# Patient Record
Sex: FEMALE | Race: BLACK OR AFRICAN AMERICAN | NOT HISPANIC OR LATINO | URBAN - METROPOLITAN AREA
[De-identification: names, ages, dates, MRNs, and addresses within clinical notes are randomized per-mention and may not be internally consistent; named-entity substitution may affect disease eponyms.]

---

## 2017-08-11 ENCOUNTER — EMERGENCY (EMERGENCY)
Facility: HOSPITAL | Age: 57
LOS: 1 days | Discharge: HOME | End: 2017-08-11
Admitting: EMERGENCY MEDICINE
Payer: COMMERCIAL

## 2017-08-11 VITALS
SYSTOLIC BLOOD PRESSURE: 151 MMHG | OXYGEN SATURATION: 100 % | RESPIRATION RATE: 16 BRPM | HEART RATE: 75 BPM | TEMPERATURE: 98 F | DIASTOLIC BLOOD PRESSURE: 99 MMHG

## 2017-08-11 LAB
ALBUMIN SERPL ELPH-MCNC: 3.9 G/DL — SIGNIFICANT CHANGE UP (ref 3.3–5)
ALP SERPL-CCNC: 93 U/L — SIGNIFICANT CHANGE UP (ref 40–120)
ALT FLD-CCNC: 12 U/L — SIGNIFICANT CHANGE UP (ref 4–33)
APTT BLD: 30.3 SEC — SIGNIFICANT CHANGE UP (ref 27.5–37.4)
AST SERPL-CCNC: 18 U/L — SIGNIFICANT CHANGE UP (ref 4–32)
BASOPHILS # BLD AUTO: 0.04 K/UL — SIGNIFICANT CHANGE UP (ref 0–0.2)
BASOPHILS NFR BLD AUTO: 0.4 % — SIGNIFICANT CHANGE UP (ref 0–2)
BILIRUB SERPL-MCNC: 0.3 MG/DL — SIGNIFICANT CHANGE UP (ref 0.2–1.2)
BUN SERPL-MCNC: 12 MG/DL — SIGNIFICANT CHANGE UP (ref 7–23)
CALCIUM SERPL-MCNC: 9.8 MG/DL — SIGNIFICANT CHANGE UP (ref 8.4–10.5)
CHLORIDE SERPL-SCNC: 103 MMOL/L — SIGNIFICANT CHANGE UP (ref 98–107)
CO2 SERPL-SCNC: 24 MMOL/L — SIGNIFICANT CHANGE UP (ref 22–31)
CREAT SERPL-MCNC: 1.42 MG/DL — HIGH (ref 0.5–1.3)
EOSINOPHIL # BLD AUTO: 0.3 K/UL — SIGNIFICANT CHANGE UP (ref 0–0.5)
EOSINOPHIL NFR BLD AUTO: 2.9 % — SIGNIFICANT CHANGE UP (ref 0–6)
GLUCOSE SERPL-MCNC: 97 MG/DL — SIGNIFICANT CHANGE UP (ref 70–99)
HCT VFR BLD CALC: 30.7 % — LOW (ref 34.5–45)
HGB BLD-MCNC: 9.6 G/DL — LOW (ref 11.5–15.5)
IMM GRANULOCYTES # BLD AUTO: 0.04 # — SIGNIFICANT CHANGE UP
IMM GRANULOCYTES NFR BLD AUTO: 0.4 % — SIGNIFICANT CHANGE UP (ref 0–1.5)
INR BLD: 1.12 — SIGNIFICANT CHANGE UP (ref 0.88–1.17)
LIDOCAIN IGE QN: 33.1 U/L — SIGNIFICANT CHANGE UP (ref 7–60)
LYMPHOCYTES # BLD AUTO: 2.37 K/UL — SIGNIFICANT CHANGE UP (ref 1–3.3)
LYMPHOCYTES # BLD AUTO: 23.1 % — SIGNIFICANT CHANGE UP (ref 13–44)
MCHC RBC-ENTMCNC: 22.1 PG — LOW (ref 27–34)
MCHC RBC-ENTMCNC: 31.3 % — LOW (ref 32–36)
MCV RBC AUTO: 70.7 FL — LOW (ref 80–100)
MONOCYTES # BLD AUTO: 0.56 K/UL — SIGNIFICANT CHANGE UP (ref 0–0.9)
MONOCYTES NFR BLD AUTO: 5.4 % — SIGNIFICANT CHANGE UP (ref 2–14)
NEUTROPHILS # BLD AUTO: 6.97 K/UL — SIGNIFICANT CHANGE UP (ref 1.8–7.4)
NEUTROPHILS NFR BLD AUTO: 67.8 % — SIGNIFICANT CHANGE UP (ref 43–77)
NRBC # FLD: 0 — SIGNIFICANT CHANGE UP
PLATELET # BLD AUTO: 347 K/UL — SIGNIFICANT CHANGE UP (ref 150–400)
PMV BLD: 10.2 FL — SIGNIFICANT CHANGE UP (ref 7–13)
POTASSIUM SERPL-MCNC: 4.7 MMOL/L — SIGNIFICANT CHANGE UP (ref 3.5–5.3)
POTASSIUM SERPL-SCNC: 4.7 MMOL/L — SIGNIFICANT CHANGE UP (ref 3.5–5.3)
PROT SERPL-MCNC: 8 G/DL — SIGNIFICANT CHANGE UP (ref 6–8.3)
PROTHROM AB SERPL-ACNC: 12.6 SEC — SIGNIFICANT CHANGE UP (ref 9.8–13.1)
RBC # BLD: 4.34 M/UL — SIGNIFICANT CHANGE UP (ref 3.8–5.2)
RBC # FLD: 16.7 % — HIGH (ref 10.3–14.5)
SODIUM SERPL-SCNC: 140 MMOL/L — SIGNIFICANT CHANGE UP (ref 135–145)
WBC # BLD: 10.28 K/UL — SIGNIFICANT CHANGE UP (ref 3.8–10.5)
WBC # FLD AUTO: 10.28 K/UL — SIGNIFICANT CHANGE UP (ref 3.8–10.5)

## 2017-08-11 PROCEDURE — 76705 ECHO EXAM OF ABDOMEN: CPT | Mod: 26

## 2017-08-11 PROCEDURE — 99285 EMERGENCY DEPT VISIT HI MDM: CPT

## 2017-08-11 RX ORDER — MORPHINE SULFATE 50 MG/1
4 CAPSULE, EXTENDED RELEASE ORAL ONCE
Qty: 0 | Refills: 0 | Status: DISCONTINUED | OUTPATIENT
Start: 2017-08-11 | End: 2017-08-11

## 2017-08-11 RX ORDER — SODIUM CHLORIDE 9 MG/ML
1000 INJECTION INTRAMUSCULAR; INTRAVENOUS; SUBCUTANEOUS ONCE
Qty: 0 | Refills: 0 | Status: COMPLETED | OUTPATIENT
Start: 2017-08-11 | End: 2017-08-11

## 2017-08-11 RX ADMIN — MORPHINE SULFATE 4 MILLIGRAM(S): 50 CAPSULE, EXTENDED RELEASE ORAL at 23:06

## 2017-08-11 RX ADMIN — SODIUM CHLORIDE 2000 MILLILITER(S): 9 INJECTION INTRAMUSCULAR; INTRAVENOUS; SUBCUTANEOUS at 22:20

## 2017-08-11 NOTE — ED PROVIDER NOTE - PLAN OF CARE
Follow up with your PMD within 24-48hrs. Show copies of your reports given to you. Take all of your medications as previously prescribed. Resume your iron as previously prescribed. Take Motrin 600mg every 6-8hrs as needed for pain with food. Low fat diet as discussed. Follow up with a General Surgeon within the week- referral list provided. Worsening, continued or new concerning symptoms return to the emergency department.

## 2017-08-11 NOTE — ED PROVIDER NOTE - OBJECTIVE STATEMENT
56 y/o female presents to the ED c/o RUQ abd pain with no radiation x last night with decreased appetite. Pt reports pain with sudden onset, atraumatic, and not associated with eating. Pain worsens with lying down; hence, pt slept on the chair. Reports waking up with similar pain intensity. Visited urgent care, who advised ED visits for possible cholecystitis. Last BM: earlier today. Denies N/V, changes in BM, dysuria, hematuria, and any other complaints.  PMHX: HTN, HLD, colon polyps, and borderline DM  PSHx: .  No Hx of gallstones nor kidney stones.  Medications: simvastatin for HLD, and amlodipine for HTN.  No smoking Hx.  NKDA. 58 y/o female presents to the ED c/o sudden onset RUQ abd pain with no radiation since last night with decreased appetite. Pt reports pain with sudden onset, atraumatic, and not associated with eating. Pain worsens with lying down; hence, pt slept on the chair. Reports waking up with similar pain intensity. Visited urgent care, who advised ED visits for possible cholecystitis. Last BM: earlier today. Denies N/V, changes in BM, dysuria, hematuria, and any other complaints.  PMHX: HTN, HLD, colon polyps, and borderline DM  PSHx: .  No Hx of gallstones nor kidney stones.  Medications: simvastatin for HLD, and amlodipine for HTN.  No smoking Hx.  NKDA. 56 y/o female presents to the ED c/o sudden onset RUQ abd pain with no radiation since last night with decreased appetite. Pt reports pain with sudden onset, atraumatic, and not associated with eating.  Reports waking up with similar pain intensity. Visited urgent care, who advised ED visits for possible cholecystitis. Last BM: earlier today- losse stool. Denies chest pain, shortness of breath, N/V,  dysuria, hematuria, recent travel, recent illness   PMHX: HTN, HLD, colon polyps, and borderline DM  PSHx: .  No Hx of gallstones nor kidney stones.  Medications: simvastatin amlodipine   No smoking Hx.  NKDA.

## 2017-08-11 NOTE — ED PROVIDER NOTE - MEDICAL DECISION MAKING DETAILS
58 y/o female with sudden onset, severe RUQ pain. R/o acute cholecystitis. Plan: Labs: fluids, NPO, pain control, and US abd. 56 y/o female with sudden onset, severe RUQ pain. R/o acute cholecystitis. Plan: Labs: fluids, NPO, pain control, and US abd, reassess

## 2017-08-11 NOTE — ED PROVIDER NOTE - PROGRESS NOTE DETAILS
ROLANDO Medrano- pt states improvement s/p pain medication. Abdominal US revealing adenomyomatosis of the gallbladder without gallstones or evidence of cholecystitis. LFTS and lipase WNL. Hg 9.6- pt states h/o anemia in which she is suppose to be taking iron but stopped. Agrees to resume. Discussed low fat diet until sx's resolve and further evaluation with general surgeon.  creatinine noted to be 1.46- 1 liter of fluids given. Pt instructed to increase fluids and to have repeated with PMD.

## 2017-08-11 NOTE — ED PROVIDER NOTE - CARE PLAN
Principal Discharge DX:	Abdominal pain Principal Discharge DX:	Abdominal pain  Instructions for follow-up, activity and diet:	Follow up with your PMD within 24-48hrs. Show copies of your reports given to you. Take all of your medications as previously prescribed. Resume your iron as previously prescribed. Take Motrin 600mg every 6-8hrs as needed for pain with food. Low fat diet as discussed. Follow up with a General Surgeon within the week- referral list provided. Worsening, continued or new concerning symptoms return to the emergency department.

## 2017-08-11 NOTE — ED ADULT TRIAGE NOTE - CHIEF COMPLAINT QUOTE
Pt c/o pain to RUQ of abdomen since yesterday, pt was seen in urgent care and sent in to r/o gallbladder stones, denies n/v/d.

## 2017-08-12 RX ORDER — IBUPROFEN 200 MG
1 TABLET ORAL
Qty: 20 | Refills: 0 | OUTPATIENT
Start: 2017-08-12

## 2017-08-21 PROBLEM — Z00.00 ENCOUNTER FOR PREVENTIVE HEALTH EXAMINATION: Status: ACTIVE | Noted: 2017-08-21

## 2017-08-24 ENCOUNTER — APPOINTMENT (OUTPATIENT)
Dept: SURGERY | Facility: CLINIC | Age: 57
End: 2017-08-24

## 2017-09-02 ENCOUNTER — OUTPATIENT (OUTPATIENT)
Dept: OUTPATIENT SERVICES | Facility: HOSPITAL | Age: 57
LOS: 1 days | End: 2017-09-02
Payer: COMMERCIAL

## 2017-09-02 PROCEDURE — 74177 CT ABD & PELVIS W/CONTRAST: CPT | Mod: 26

## 2017-09-02 PROCEDURE — 74177 CT ABD & PELVIS W/CONTRAST: CPT

## 2017-09-07 ENCOUNTER — INPATIENT (INPATIENT)
Facility: HOSPITAL | Age: 57
LOS: 8 days | Discharge: HOME HEALTH | DRG: 329 | End: 2017-09-16
Attending: STUDENT IN AN ORGANIZED HEALTH CARE EDUCATION/TRAINING PROGRAM | Admitting: STUDENT IN AN ORGANIZED HEALTH CARE EDUCATION/TRAINING PROGRAM
Payer: COMMERCIAL

## 2017-09-07 VITALS
DIASTOLIC BLOOD PRESSURE: 94 MMHG | HEART RATE: 96 BPM | OXYGEN SATURATION: 99 % | RESPIRATION RATE: 18 BRPM | WEIGHT: 193.57 LBS | TEMPERATURE: 99 F | SYSTOLIC BLOOD PRESSURE: 143 MMHG

## 2017-09-07 DIAGNOSIS — Z98.891 HISTORY OF UTERINE SCAR FROM PREVIOUS SURGERY: Chronic | ICD-10-CM

## 2017-09-07 LAB
ALBUMIN SERPL ELPH-MCNC: 3.8 G/DL — SIGNIFICANT CHANGE UP (ref 3.3–5)
ALP SERPL-CCNC: 93 U/L — SIGNIFICANT CHANGE UP (ref 40–120)
ALT FLD-CCNC: 10 U/L — SIGNIFICANT CHANGE UP (ref 10–45)
ANION GAP SERPL CALC-SCNC: 16 MMOL/L — SIGNIFICANT CHANGE UP (ref 5–17)
APTT BLD: 28.9 SEC — SIGNIFICANT CHANGE UP (ref 27.5–37.4)
AST SERPL-CCNC: 18 U/L — SIGNIFICANT CHANGE UP (ref 10–40)
BASOPHILS NFR BLD AUTO: 0.3 % — SIGNIFICANT CHANGE UP (ref 0–2)
BILIRUB SERPL-MCNC: 0.3 MG/DL — SIGNIFICANT CHANGE UP (ref 0.2–1.2)
BUN SERPL-MCNC: 10 MG/DL — SIGNIFICANT CHANGE UP (ref 7–23)
CALCIUM SERPL-MCNC: 9.8 MG/DL — SIGNIFICANT CHANGE UP (ref 8.4–10.5)
CHLORIDE SERPL-SCNC: 100 MMOL/L — SIGNIFICANT CHANGE UP (ref 96–108)
CO2 SERPL-SCNC: 23 MMOL/L — SIGNIFICANT CHANGE UP (ref 22–31)
CREAT SERPL-MCNC: 1.5 MG/DL — HIGH (ref 0.5–1.3)
EOSINOPHIL NFR BLD AUTO: 2.1 % — SIGNIFICANT CHANGE UP (ref 0–6)
GLUCOSE SERPL-MCNC: 104 MG/DL — HIGH (ref 70–99)
HCT VFR BLD CALC: 29.8 % — LOW (ref 34.5–45)
HGB BLD-MCNC: 9.3 G/DL — LOW (ref 11.5–15.5)
INR BLD: 1.13 — SIGNIFICANT CHANGE UP (ref 0.88–1.16)
LYMPHOCYTES # BLD AUTO: 17.4 % — SIGNIFICANT CHANGE UP (ref 13–44)
MCHC RBC-ENTMCNC: 21 PG — LOW (ref 27–34)
MCHC RBC-ENTMCNC: 31.2 G/DL — LOW (ref 32–36)
MCV RBC AUTO: 67.3 FL — LOW (ref 80–100)
MONOCYTES NFR BLD AUTO: 7.9 % — SIGNIFICANT CHANGE UP (ref 2–14)
NEUTROPHILS NFR BLD AUTO: 72.3 % — SIGNIFICANT CHANGE UP (ref 43–77)
PLATELET # BLD AUTO: 453 K/UL — HIGH (ref 150–400)
POTASSIUM SERPL-MCNC: 4.7 MMOL/L — SIGNIFICANT CHANGE UP (ref 3.5–5.3)
POTASSIUM SERPL-SCNC: 4.7 MMOL/L — SIGNIFICANT CHANGE UP (ref 3.5–5.3)
PROT SERPL-MCNC: 8.1 G/DL — SIGNIFICANT CHANGE UP (ref 6–8.3)
PROTHROM AB SERPL-ACNC: 12.6 SEC — SIGNIFICANT CHANGE UP (ref 9.8–12.7)
RBC # BLD: 4.43 M/UL — SIGNIFICANT CHANGE UP (ref 3.8–5.2)
RBC # FLD: 16.8 % — SIGNIFICANT CHANGE UP (ref 10.3–16.9)
SODIUM SERPL-SCNC: 139 MMOL/L — SIGNIFICANT CHANGE UP (ref 135–145)
WBC # BLD: 10.2 K/UL — SIGNIFICANT CHANGE UP (ref 3.8–10.5)
WBC # FLD AUTO: 10.2 K/UL — SIGNIFICANT CHANGE UP (ref 3.8–10.5)

## 2017-09-07 PROCEDURE — 99221 1ST HOSP IP/OBS SF/LOW 40: CPT

## 2017-09-07 PROCEDURE — 71010: CPT | Mod: 26

## 2017-09-07 PROCEDURE — 93010 ELECTROCARDIOGRAM REPORT: CPT

## 2017-09-07 PROCEDURE — 99285 EMERGENCY DEPT VISIT HI MDM: CPT | Mod: 25

## 2017-09-07 PROCEDURE — 99222 1ST HOSP IP/OBS MODERATE 55: CPT | Mod: GC

## 2017-09-07 RX ORDER — SODIUM CHLORIDE 9 MG/ML
1000 INJECTION INTRAMUSCULAR; INTRAVENOUS; SUBCUTANEOUS ONCE
Qty: 0 | Refills: 0 | Status: COMPLETED | OUTPATIENT
Start: 2017-09-07 | End: 2017-09-07

## 2017-09-07 RX ADMIN — SODIUM CHLORIDE 1000 MILLILITER(S): 9 INJECTION INTRAMUSCULAR; INTRAVENOUS; SUBCUTANEOUS at 23:00

## 2017-09-07 NOTE — ED ADULT NURSE NOTE - CHPI ED SYMPTOMS NEG
no vomiting/no blood in stool/no diarrhea/no dysuria/no fever/no nausea/no abdominal distension/no chills/no burning urination/no hematuria

## 2017-09-07 NOTE — ED PROVIDER NOTE - PROGRESS NOTE DETAILS
spoke with Dr. Urias - recommend admission to hospitalist and he will see in the morning. urology aware.

## 2017-09-07 NOTE — ED PROVIDER NOTE - OBJECTIVE STATEMENT
57F hx htn, high chol, newly diagnosed metastatic colon cancer sent in by Dr. Urias for admission. pt had CT scan 9/2 showing metastatic disease, colonic mass causing L ureteral obstruction with hydronephrosis.  pt c/o mild diffuse abdominal discomfort. decreased appetite. states has had about 6lb weight loss over past week.  also states no bowel movement x1 week.  was told to come in for likely colonic stent and ureteral stent.  no fevers. no vomiting.

## 2017-09-07 NOTE — ED PROVIDER NOTE - MEDICAL DECISION MAKING DETAILS
recently diagnosed metastatic cancer with large colonic mass causing blockage  -check labs, ekg, cxr

## 2017-09-08 ENCOUNTER — TRANSCRIPTION ENCOUNTER (OUTPATIENT)
Age: 57
End: 2017-09-08

## 2017-09-08 DIAGNOSIS — E78.00 PURE HYPERCHOLESTEROLEMIA, UNSPECIFIED: ICD-10-CM

## 2017-09-08 DIAGNOSIS — D50.9 IRON DEFICIENCY ANEMIA, UNSPECIFIED: ICD-10-CM

## 2017-09-08 DIAGNOSIS — R10.9 UNSPECIFIED ABDOMINAL PAIN: ICD-10-CM

## 2017-09-08 DIAGNOSIS — D47.3 ESSENTIAL (HEMORRHAGIC) THROMBOCYTHEMIA: ICD-10-CM

## 2017-09-08 DIAGNOSIS — N13.30 UNSPECIFIED HYDRONEPHROSIS: ICD-10-CM

## 2017-09-08 DIAGNOSIS — R60.0 LOCALIZED EDEMA: ICD-10-CM

## 2017-09-08 DIAGNOSIS — N17.9 ACUTE KIDNEY FAILURE, UNSPECIFIED: ICD-10-CM

## 2017-09-08 DIAGNOSIS — C78.5 SECONDARY MALIGNANT NEOPLASM OF LARGE INTESTINE AND RECTUM: ICD-10-CM

## 2017-09-08 DIAGNOSIS — I10 ESSENTIAL (PRIMARY) HYPERTENSION: ICD-10-CM

## 2017-09-08 DIAGNOSIS — Q45.3 OTHER CONGENITAL MALFORMATIONS OF PANCREAS AND PANCREATIC DUCT: ICD-10-CM

## 2017-09-08 DIAGNOSIS — D64.9 ANEMIA, UNSPECIFIED: ICD-10-CM

## 2017-09-08 LAB
ANION GAP SERPL CALC-SCNC: 13 MMOL/L — SIGNIFICANT CHANGE UP (ref 5–17)
APPEARANCE UR: CLEAR — SIGNIFICANT CHANGE UP
APTT BLD: 34.2 SEC — SIGNIFICANT CHANGE UP (ref 27.5–37.4)
BILIRUB UR-MCNC: NEGATIVE — SIGNIFICANT CHANGE UP
BUN SERPL-MCNC: 10 MG/DL — SIGNIFICANT CHANGE UP (ref 7–23)
CALCIUM SERPL-MCNC: 9.3 MG/DL — SIGNIFICANT CHANGE UP (ref 8.4–10.5)
CEA SERPL-MCNC: 22.7 NG/ML — HIGH (ref 0–3.8)
CHLORIDE SERPL-SCNC: 102 MMOL/L — SIGNIFICANT CHANGE UP (ref 96–108)
CO2 SERPL-SCNC: 24 MMOL/L — SIGNIFICANT CHANGE UP (ref 22–31)
COLOR SPEC: YELLOW — SIGNIFICANT CHANGE UP
CREAT SERPL-MCNC: 1.4 MG/DL — HIGH (ref 0.5–1.3)
DIFF PNL FLD: NEGATIVE — SIGNIFICANT CHANGE UP
FERRITIN SERPL-MCNC: 18 NG/ML — SIGNIFICANT CHANGE UP (ref 15–150)
GLUCOSE SERPL-MCNC: 94 MG/DL — SIGNIFICANT CHANGE UP (ref 70–99)
GLUCOSE UR QL: NEGATIVE — SIGNIFICANT CHANGE UP
HCT VFR BLD CALC: 27.8 % — LOW (ref 34.5–45)
HGB BLD-MCNC: 8.6 G/DL — LOW (ref 11.5–15.5)
INR BLD: 1.16 — SIGNIFICANT CHANGE UP (ref 0.88–1.16)
IRON SATN MFR SERPL: 17 UG/DL — LOW (ref 30–160)
IRON SATN MFR SERPL: 6 % — LOW (ref 14–50)
KETONES UR-MCNC: NEGATIVE — SIGNIFICANT CHANGE UP
LEUKOCYTE ESTERASE UR-ACNC: NEGATIVE — SIGNIFICANT CHANGE UP
MAGNESIUM SERPL-MCNC: 2.2 MG/DL — SIGNIFICANT CHANGE UP (ref 1.6–2.6)
MCHC RBC-ENTMCNC: 21.2 PG — LOW (ref 27–34)
MCHC RBC-ENTMCNC: 30.9 G/DL — LOW (ref 32–36)
MCV RBC AUTO: 68.5 FL — LOW (ref 80–100)
NITRITE UR-MCNC: NEGATIVE — SIGNIFICANT CHANGE UP
PH UR: 6 — SIGNIFICANT CHANGE UP (ref 5–8)
PLATELET # BLD AUTO: 380 K/UL — SIGNIFICANT CHANGE UP (ref 150–400)
POTASSIUM SERPL-MCNC: 4.4 MMOL/L — SIGNIFICANT CHANGE UP (ref 3.5–5.3)
POTASSIUM SERPL-SCNC: 4.4 MMOL/L — SIGNIFICANT CHANGE UP (ref 3.5–5.3)
PROT UR-MCNC: NEGATIVE MG/DL — SIGNIFICANT CHANGE UP
PROTHROM AB SERPL-ACNC: 12.9 SEC — HIGH (ref 9.8–12.7)
RBC # BLD: 4.06 M/UL — SIGNIFICANT CHANGE UP (ref 3.8–5.2)
RBC # FLD: 17 % — HIGH (ref 10.3–16.9)
SODIUM SERPL-SCNC: 139 MMOL/L — SIGNIFICANT CHANGE UP (ref 135–145)
SP GR SPEC: <=1.005 — SIGNIFICANT CHANGE UP (ref 1–1.03)
TIBC SERPL-MCNC: 276 UG/DL — SIGNIFICANT CHANGE UP (ref 220–430)
UIBC SERPL-MCNC: 259 UG/DL — SIGNIFICANT CHANGE UP (ref 110–370)
UROBILINOGEN FLD QL: 0.2 E.U./DL — SIGNIFICANT CHANGE UP
WBC # BLD: 7.4 K/UL — SIGNIFICANT CHANGE UP (ref 3.8–10.5)
WBC # FLD AUTO: 7.4 K/UL — SIGNIFICANT CHANGE UP (ref 3.8–10.5)

## 2017-09-08 PROCEDURE — 93010 ELECTROCARDIOGRAM REPORT: CPT

## 2017-09-08 PROCEDURE — 99233 SBSQ HOSP IP/OBS HIGH 50: CPT | Mod: GC

## 2017-09-08 PROCEDURE — 93970 EXTREMITY STUDY: CPT | Mod: 26

## 2017-09-08 PROCEDURE — 78815 PET IMAGE W/CT SKULL-THIGH: CPT | Mod: 26

## 2017-09-08 RX ORDER — AMLODIPINE BESYLATE 2.5 MG/1
5 TABLET ORAL DAILY
Qty: 0 | Refills: 0 | Status: DISCONTINUED | OUTPATIENT
Start: 2017-09-08 | End: 2017-09-08

## 2017-09-08 RX ORDER — SODIUM CHLORIDE 9 MG/ML
1000 INJECTION INTRAMUSCULAR; INTRAVENOUS; SUBCUTANEOUS
Qty: 0 | Refills: 0 | Status: DISCONTINUED | OUTPATIENT
Start: 2017-09-08 | End: 2017-09-13

## 2017-09-08 RX ORDER — ACETAMINOPHEN 500 MG
650 TABLET ORAL EVERY 6 HOURS
Qty: 0 | Refills: 0 | Status: DISCONTINUED | OUTPATIENT
Start: 2017-09-08 | End: 2017-09-14

## 2017-09-08 RX ORDER — SODIUM FERRIC GLUCONAT/SUCROSE 62.5MG/5ML
100 AMPUL (ML) INTRAVENOUS ONCE
Qty: 0 | Refills: 0 | Status: COMPLETED | OUTPATIENT
Start: 2017-09-08 | End: 2017-09-08

## 2017-09-08 RX ORDER — AMLODIPINE BESYLATE 2.5 MG/1
5 TABLET ORAL DAILY
Qty: 0 | Refills: 0 | Status: DISCONTINUED | OUTPATIENT
Start: 2017-09-08 | End: 2017-09-14

## 2017-09-08 RX ORDER — POLYETHYLENE GLYCOL 3350 17 G/17G
17 POWDER, FOR SOLUTION ORAL DAILY
Qty: 0 | Refills: 0 | Status: DISCONTINUED | OUTPATIENT
Start: 2017-09-08 | End: 2017-09-09

## 2017-09-08 RX ORDER — SODIUM FERRIC GLUCONAT/SUCROSE 62.5MG/5ML
AMPUL (ML) INTRAVENOUS
Qty: 0 | Refills: 0 | Status: COMPLETED | OUTPATIENT
Start: 2017-09-09 | End: 2017-09-09

## 2017-09-08 RX ORDER — SODIUM FERRIC GLUCONAT/SUCROSE 62.5MG/5ML
25 AMPUL (ML) INTRAVENOUS ONCE
Qty: 0 | Refills: 0 | Status: COMPLETED | OUTPATIENT
Start: 2017-09-08 | End: 2017-09-08

## 2017-09-08 RX ORDER — SIMVASTATIN 20 MG/1
40 TABLET, FILM COATED ORAL AT BEDTIME
Qty: 0 | Refills: 0 | Status: DISCONTINUED | OUTPATIENT
Start: 2017-09-08 | End: 2017-09-14

## 2017-09-08 RX ORDER — SODIUM CHLORIDE 9 MG/ML
1000 INJECTION INTRAMUSCULAR; INTRAVENOUS; SUBCUTANEOUS
Qty: 0 | Refills: 0 | Status: DISCONTINUED | OUTPATIENT
Start: 2017-09-08 | End: 2017-09-08

## 2017-09-08 RX ORDER — ENOXAPARIN SODIUM 100 MG/ML
40 INJECTION SUBCUTANEOUS EVERY 24 HOURS
Qty: 0 | Refills: 0 | Status: DISCONTINUED | OUTPATIENT
Start: 2017-09-08 | End: 2017-09-08

## 2017-09-08 RX ADMIN — SIMVASTATIN 40 MILLIGRAM(S): 20 TABLET, FILM COATED ORAL at 22:00

## 2017-09-08 RX ADMIN — AMLODIPINE BESYLATE 5 MILLIGRAM(S): 2.5 TABLET ORAL at 00:53

## 2017-09-08 RX ADMIN — Medication 108 MILLIGRAM(S): at 19:42

## 2017-09-08 RX ADMIN — ENOXAPARIN SODIUM 40 MILLIGRAM(S): 100 INJECTION SUBCUTANEOUS at 01:37

## 2017-09-08 RX ADMIN — Medication 108 MILLIGRAM(S): at 22:00

## 2017-09-08 RX ADMIN — SODIUM CHLORIDE 70 MILLILITER(S): 9 INJECTION INTRAMUSCULAR; INTRAVENOUS; SUBCUTANEOUS at 00:52

## 2017-09-08 NOTE — CONSULT NOTE ADULT - ASSESSMENT
57 year old F with PMHx significant for Metastatic colon cancer, Pancreatic mass, HTN, HLD, Obesity, sent to the ED by her oncologist (Dr Urias) for evaluation of abdominal discomfort and constipation x1 week.    PLAN -   # Colon cancer -   - with mets   - with near obstruction - causing constipation  - please give miralax daily to help loosen stool over the weekend  - will plan for a colonic stent early next week   - surgical input     # Pancreatic mass -   - unclear if a primary pancreatic mass vs mets (less likely)  - will plan for EUS with Dr Terrell early next week  - can be scheduled as an outpatient if patient stable for dc    Discussed with attending Dr Terrell  GI following

## 2017-09-08 NOTE — H&P ADULT - ATTENDING COMMENTS
pt seen and examined; reviewed h&p, vs, labs, cxr  pt a/f constipation in setting of colonic mass, also found to have left hydroureteronephrosis  from mass compression; pt reports passing gas  PE findings as above, abdomen is nontender, in addition no CVA tenderness b/l   a/p:   1. abdominal pain, constipation in setting of colonic mass: planning for stent procedure; GI consult pending; NPO for now, on IVFs   2. Colon cancer w/ mets: follow up onc recs  3. L hydronephrosis : appreciate urologic recs; procedure pending   4. CATRACHITA: monitor renal function, c/w IVFs  5. lower ext edema: agree w/ dopplers  6. pancreatic lesion: follow up onc recs   7. anemia/ thrombocytosis: monitor CBC pt seen and examined; reviewed h&p, vs, labs, cxr  pt a/f constipation in setting of colonic mass, also found to have left hydroureteronephrosis  from mass compression; pt reports passing gas  PE findings as above, abdomen is nontender, in addition no CVA tenderness b/l   a/p:   1. abdominal pain, constipation in setting of colonic mass: planning for stent procedure; GI consult pending; NPO for now, on IVFs   2. Colon cancer w/ mets: follow up onc recs  3. L hydronephrosis : appreciate urologic recs; procedure pending   4. CATRACHITA: monitor renal function, c/w IVFs  5. lower ext edema: agree w/ dopplers  6. pancreatic lesion: follow up onc recs   7. anemia/ thrombocytosis: monitor CBC  8. Hold lovenox pending possible GI and urologic procedures

## 2017-09-08 NOTE — H&P ADULT - PROBLEM SELECTOR PLAN 10
-c/w simvastatin 40 mg daily    Diet: NPO for now    DVT prophylaxis: lovenox    FULL CODE    Admit to F -c/w simvastatin 40 mg daily    Diet: NPO for now    DVT prophylaxis: hold lovenox     FULL CODE    Admit to F

## 2017-09-08 NOTE — PROGRESS NOTE ADULT - SUBJECTIVE AND OBJECTIVE BOX
Patient films reviewed with attending. Hydronephrosis and hydroureter on left with invasion of colonic mass into left ureter. Creatinine mildly elevated at 1.5, afebrile, making urine. Would recommend urine cytology and can pursue left percutaneous nephrostomy if consistent with goals of care. Discussed with attending.                          8.6    7.4   )-----------( 380      ( 08 Sep 2017 07:40 )             27.8     08 Sep 2017 07:40    139    |  102    |  10     ----------------------------<  94     4.4     |  24     |  1.40     Ca    9.3        08 Sep 2017 07:40  Mg     2.2       08 Sep 2017 07:40    TPro  8.1    /  Alb  3.8    /  TBili  0.3    /  DBili  x      /  AST  18     /  ALT  10     /  AlkPhos  93     07 Sep 2017 21:53

## 2017-09-08 NOTE — CONSULT NOTE ADULT - PROBLEM SELECTOR RECOMMENDATION 9
Suspicion for metastatic colon cancer given liver and lung masses biopsy consistent with adenocarcinoma, 6.4cm on CT imaging with compression of left ureter causing hydronephrosis and colonic obstruction. Of note pancreatic mass 2.4cm is suspicious could be second primary vs. metastasis vs. non malig lesion. Urology, GI, and surgery on board. Overall patient has good performance status and will likely be a good candidate for chemotherapy.     -f/u PET/CT scan for staging  -tumor markers CEA,   -will likely need liver biospy and tissue for molecular markers  -colonic stent vs. colostomy to relieve obstruction and continue bowel regimen   -monitor renal function s/p nephrostomy tube

## 2017-09-08 NOTE — PROGRESS NOTE ADULT - ASSESSMENT
57 year old F w/ PMH of recently diagnosed metastatic colon cancer, HTN, HLD, presenting to [facility] ED with abdominal discomfort and constipation x1 week

## 2017-09-08 NOTE — CONSULT NOTE ADULT - PROBLEM SELECTOR RECOMMENDATION 9
Caused by colon mass  -admitted to medicine  -for L stent vs PCN  -to d/w attending  -to review CT images  -continue present care

## 2017-09-08 NOTE — PROGRESS NOTE ADULT - PROBLEM SELECTOR PLAN 10
-c/w simvastatin 40 mg daily    Diet: NPO for procedure otherwise Reg Diet  DVT prophylaxis: hold lovenox pending procedure  FULL CODE  Continue on RMF

## 2017-09-08 NOTE — CONSULT NOTE ADULT - SUBJECTIVE AND OBJECTIVE BOX
Hematology Oncology Consult Note (Dr. Urias)     57 year old F with PMH of HTN and HLD who is here for initial consult of newly diagnosed colorectal adenocarcinoma. Patient initially began having weight loss, abnormal stools, and intermittent bright red blood per rectum in mid March. She had colonoscopy which revealed a large lesion in the sigmoid colon; scope could not be advanced past obstructive tumor. Biospy showed moderately differentiated invasive adenocarcinoma. CT scan of abd/pelvis showed bilateral lung nodules and a 3.7 cm in largest diameter liver lesion consistent with mets. In addition, there is pancreatic duct dilation and a mass at the pancreatic body. LFTs, T Bili WNL, Hb trending down, MCV 70. Patient has not had a BM since last week after colonoscopy. ROS also + for weight loss. No chest pain, dyspnea, cough. No abdominal pain however notes distention. No nausea/vomiting. No diarrhea as of now.       ROS is otherwise negative.      PAST MEDICAL & SURGICAL HISTORY:  High cholesterol  HTN (hypertension)  Colon cancer  H/O  section      Allergies: NKDA       Medications: · 	amLODIPine 5 mg oral tablet: 1 tab(s) orally once a day, Last Dose Taken:    · 	simvastatin 40 mg oral tablet: 1 tab(s) orally once a day (at bedtime), Last Dose Taken:      . .        Social History:No smoking  No drinking  lives with son    FAMILY HISTORY:  Family history of colon cancer (Sibling)      PHYSICAL EXAM:    T(F): 98.2 (17 @ 09:29), Max: 98.8 (17 @ 21:24)  HR: 87 (17 @ 09:29) (68 - 96)  BP: 136/86 (17 @ 09:29) (108/69 - 168/96)  RR: 14 (17 @ 09:29) (14 - 18)  SpO2: 100% (17 @ 09:29) (97% - 100%)  Wt(kg): --    Daily Height in cm: 154.94 (08 Sep 2017 01:14)    Daily Weight in k.8 (08 Sep 2017 01:14)    Exam:             Labs:                          8.6    7.4   )-----------( 380      ( 08 Sep 2017 07:40 )             27.8     CBC Full  -  ( 08 Sep 2017 07:40 )  WBC Count : 7.4 K/uL  Hemoglobin : 8.6 g/dL  Hematocrit : 27.8 %  Platelet Count - Automated : 380 K/uL  Mean Cell Volume : 68.5 fL  Mean Cell Hemoglobin : 21.2 pg  Mean Cell Hemoglobin Concentration : 30.9 g/dL  Auto Neutrophil # : x  Auto Lymphocyte # : x  Auto Monocyte # : x  Auto Eosinophil # : x  Auto Basophil # : x  Auto Neutrophil % : x  Auto Lymphocyte % : x  Auto Monocyte % : x  Auto Eosinophil % : x  Auto Basophil % : x    PT/INR - ( 08 Sep 2017 07:40 )   PT: 12.9 sec;   INR: 1.16          PTT - ( 08 Sep 2017 07:40 )  PTT:34.2 sec        139  |  102  |  10  ----------------------------<  94  4.4   |  24  |  1.40<H>    Ca    9.3      08 Sep 2017 07:40  Mg     2.2         TPro  8.1  /  Alb  3.8  /  TBili  0.3  /  DBili  x   /  AST  18  /  ALT  10  /  AlkPhos  93        Urinalysis Basic - ( 08 Sep 2017 00:37 )    Color: Yellow / Appearance: Clear / SG: <=1.005 / pH: x  Gluc: x / Ketone: NEGATIVE  / Bili: NEGATIVE / Urobili: 0.2 E.U./dL   Blood: x / Protein: NEGATIVE mg/dL / Nitrite: NEGATIVE   Leuk Esterase: NEGATIVE / RBC: x / WBC x   Sq Epi: x / Non Sq Epi: x / Bacteria: x      CT Abd/Pelvis w/wo Contrast:      IMPRESSION: Multiple lung nodules highly suggestive of lung metastases.  2. Lesion in segment 4 of liver highly suggestive of metastasis.  3. Solid tumor in the pancreatic body with upstream dilatation of   pancreatic duct and atrophy suspect underlying pancreatic adenocarcinoma.  4. Bulky tumor mass involving the proximal sigmoid suggestive of primary   adenocarcinoma with extracolonic extension and obstruction of the left   pelvic ureter causing left hydronephrosis and hydroureter. Hematology Oncology Consult Note (Dr. Urias)     Note per outpatient clinic visit  57 year old F with PMH of HTN and HLD who is here for initial consult of newly diagnosed colorectal adenocarcinoma. Patient initially began having weight loss, abnormal stools, and intermittent bright red blood per rectum in mid March. She had colonoscopy which revealed a large lesion in the sigmoid colon; scope could not be advanced past obstructive tumor. Biospy showed moderately differentiated invasive adenocarcinoma. CT scan of abd/pelvis showed bilateral lung nodules and a 3.7 cm in largest diameter liver lesion consistent with mets. In addition, there is pancreatic duct dilation and a mass at the pancreatic body. LFTs, T Bili WNL, Hb trending down, MCV 70. Patient has not had a BM since last week after colonoscopy. ROS also + for weight loss. No chest pain, dyspnea, cough. No abdominal pain however notes distention. No nausea/vomiting. No diarrhea as of now.       ROS is otherwise negative.      PAST MEDICAL & SURGICAL HISTORY:  High cholesterol  HTN (hypertension)  Colon cancer  H/O  section      Allergies: NKDA       Medications: · 	amLODIPine 5 mg oral tablet: 1 tab(s) orally once a day, Last Dose Taken:    · 	simvastatin 40 mg oral tablet: 1 tab(s) orally once a day (at bedtime), Last Dose Taken:      . .        Social History:No smoking  No drinking  lives with son    FAMILY HISTORY:  Family history of colon cancer (Sibling)      PHYSICAL EXAM:    T(F): 98.2 (17 @ 09:29), Max: 98.8 (17 @ 21:24)  HR: 87 (17 @ 09:29) (68 - 96)  BP: 136/86 (17 @ 09:29) (108/69 - 168/96)  RR: 14 (17 @ 09:29) (14 - 18)  SpO2: 100% (17 @ 09:29) (97% - 100%)  Wt(kg): --    Daily Height in cm: 154.94 (08 Sep 2017 01:14)    Daily Weight in k.8 (08 Sep 2017 01:14)    Exam:   unable to conduct PE as patient away at PET/CT then urological procedure        Labs:                          8.6    7.4   )-----------( 380      ( 08 Sep 2017 07:40 )             27.8     CBC Full  -  ( 08 Sep 2017 07:40 )  WBC Count : 7.4 K/uL  Hemoglobin : 8.6 g/dL  Hematocrit : 27.8 %  Platelet Count - Automated : 380 K/uL  Mean Cell Volume : 68.5 fL  Mean Cell Hemoglobin : 21.2 pg  Mean Cell Hemoglobin Concentration : 30.9 g/dL  Auto Neutrophil # : x  Auto Lymphocyte # : x  Auto Monocyte # : x  Auto Eosinophil # : x  Auto Basophil # : x  Auto Neutrophil % : x  Auto Lymphocyte % : x  Auto Monocyte % : x  Auto Eosinophil % : x  Auto Basophil % : x    PT/INR - ( 08 Sep 2017 07:40 )   PT: 12.9 sec;   INR: 1.16          PTT - ( 08 Sep 2017 07:40 )  PTT:34.2 sec        139  |  102  |  10  ----------------------------<  94  4.4   |  24  |  1.40<H>    Ca    9.3      08 Sep 2017 07:40  Mg     2.2         TPro  8.1  /  Alb  3.8  /  TBili  0.3  /  DBili  x   /  AST  18  /  ALT  10  /  AlkPhos  93        Urinalysis Basic - ( 08 Sep 2017 00:37 )    Color: Yellow / Appearance: Clear / SG: <=1.005 / pH: x  Gluc: x / Ketone: NEGATIVE  / Bili: NEGATIVE / Urobili: 0.2 E.U./dL   Blood: x / Protein: NEGATIVE mg/dL / Nitrite: NEGATIVE   Leuk Esterase: NEGATIVE / RBC: x / WBC x   Sq Epi: x / Non Sq Epi: x / Bacteria: x      CT Abd/Pelvis w/wo Contrast:      IMPRESSION: Multiple lung nodules highly suggestive of lung metastases.  2. Lesion in segment 4 of liver highly suggestive of metastasis.  3. Solid tumor in the pancreatic body with upstream dilatation of   pancreatic duct and atrophy suspect underlying pancreatic adenocarcinoma.  4. Bulky tumor mass involving the proximal sigmoid suggestive of primary   adenocarcinoma with extracolonic extension and obstruction of the left   pelvic ureter causing left hydronephrosis and hydroureter.    Ultrasound legs bilat    IMPRESSION:  No deep vein thrombosis seen.

## 2017-09-08 NOTE — CONSULT NOTE ADULT - ATTENDING COMMENTS
58yo female with metastatic colon cancer with > 6cm primary lesion in left colon with extracolonic extension including obstruction of left ureter. Creatinine elevated. CT scan reviewed. Considering large size of mass and likely compression of left ureter, would recommend left percutaneous nephrostomy tube as best drainage of left kidney as long as this is consistent with patient's goals of care

## 2017-09-08 NOTE — H&P ADULT - NSHPPHYSICALEXAM_GEN_ALL_CORE
VITAL SIGNS:  Vital Signs Last 24 Hrs  T(C): 37.1 (07 Sep 2017 21:24), Max: 37.1 (07 Sep 2017 21:24)  T(F): 98.8 (07 Sep 2017 21:24), Max: 98.8 (07 Sep 2017 21:24)  HR: 96 (07 Sep 2017 21:24) (96 - 96)  BP: 143/94 (07 Sep 2017 21:24) (143/94 - 143/94)  BP(mean): --  RR: 18 (07 Sep 2017 21:24) (18 - 18)  SpO2: 99% (07 Sep 2017 21:24) (99% - 99%)    PHYSICAL EXAM:    General: WDWN  HEENT: NC/AT; PERRL, anicteric sclera, dry MM  Neck: supple  Cardiovascular: +S1/S2; RRR  Respiratory: CTA b/l; no W/R/R  Gastrointestinal: soft, NT/ND; +BSx4  Extremities: WWP; no edema, clubbing or cyanosis  Vascular: 2+ radial, DP/PT pulses b/l  Neurological: AAOx3; no focal deficits VITAL SIGNS:  Vital Signs Last 24 Hrs  T(C): 37.1 (07 Sep 2017 21:24), Max: 37.1 (07 Sep 2017 21:24)  T(F): 98.8 (07 Sep 2017 21:24), Max: 98.8 (07 Sep 2017 21:24)  HR: 96 (07 Sep 2017 21:24) (96 - 96)  BP: 143/94 (07 Sep 2017 21:24) (143/94 - 143/94)  BP(mean): --  RR: 18 (07 Sep 2017 21:24) (18 - 18)  SpO2: 99% (07 Sep 2017 21:24) (99% - 99%)    PHYSICAL EXAM:    General: WDWN  HEENT: NC/AT; PERRL, anicteric sclera, dry MM  Neck: supple  Cardiovascular: +S1/S2; RRR  Respiratory: CTA b/l; no W/R/R  Gastrointestinal: soft, midline scar from prior C-sectiom, hardened mass lateral to umbilicus on left side, nontender  Extremities: WWP; 1-2 + pitting edema in LLE, trace in RLE, mild erythema near R ankle but not warm  Neurological: AAOx3; no focal deficits

## 2017-09-08 NOTE — H&P ADULT - PROBLEM SELECTOR PLAN 2
-diagnosed last week on colonoscopy and CT scan  -awaiting final biopsy results  -Dr. Urias aware of admission, to see in AM

## 2017-09-08 NOTE — PROGRESS NOTE ADULT - SUBJECTIVE AND OBJECTIVE BOX
locally advanced and metastatic sigmoid adenoCA  METS to lung and liver  near obstruction of left colon and Left ureteral obstruction  PET Avid pancreatic MASS    Urology consullt  GII consult     Stent of colon      Biopsy of pancreatic mass      try and do stent rather than colostomy

## 2017-09-08 NOTE — CONSULT NOTE ADULT - PROBLEM SELECTOR RECOMMENDATION 2
Severely iron deficient with low iron %, total and ferritin, Hg 8.6, MCV 68.5, likely related to blood loss. No clinical signs of hemolysis, normal bilirubin.     -IV iron supplementation   -check B12, folate

## 2017-09-08 NOTE — PROGRESS NOTE ADULT - SUBJECTIVE AND OBJECTIVE BOX
57 year old F w/ PMH of recently diagnosed metastatic colon cancer, HTN, HLD, presenting to [facility] ED with abdominal discomfort and constipation x1 week was found to have partial obstruction of sigmoid colon and left hydronephrosis on imaging.     O/N Events: DANIEL  Subjective/ROS: Patient has no complaints this morning. Denies HA, CP, SOB, n/v, changes in bowel/urinary habits.  12pt ROS otherwise negative.    VITALS  Vital Signs Last 24 Hrs  T(C): 36.8 (08 Sep 2017 09:29), Max: 37.1 (07 Sep 2017 21:24)  T(F): 98.2 (08 Sep 2017 09:29), Max: 98.8 (07 Sep 2017 21:24)  HR: 87 (08 Sep 2017 09:29) (68 - 96)  BP: 136/86 (08 Sep 2017 09:29) (108/69 - 168/96)  BP(mean): --  RR: 14 (08 Sep 2017 09:29) (14 - 18)  SpO2: 100% (08 Sep 2017 09:29) (97% - 100%)    CAPILLARY BLOOD GLUCOSE  93 (08 Sep 2017 10:56)          PHYSICAL EXAM  General: A&Ox3; NAD  Head: NC/AT; MMM; PERRL; EOMI;  Neck: Supple; no JVD  Respiratory: CTA B/L; no wheezes/crackles   Cardiovascular: Regular rhythm/rate; S1/S2   Gastrointestinal: Soft; NTND; normoactive BS  Extremities: WWP; no edema/cyanosis  Neurological:  CNII-XII grossly intact; no obvious focal deficits    MEDICATIONS  (STANDING):  simvastatin 40 milliGRAM(s) Oral at bedtime  amLODIPine   Tablet 5 milliGRAM(s) Oral daily  sodium chloride 0.9%. 1000 milliLiter(s) (100 mL/Hr) IV Continuous <Continuous>    MEDICATIONS  (PRN):  acetaminophen   Tablet. 650 milliGRAM(s) Oral every 6 hours PRN Moderate Pain (4 - 6)      No Known Allergies      LABS                        8.6    7.4   )-----------( 380      ( 08 Sep 2017 07:40 )             27.8     09-08    139  |  102  |  10  ----------------------------<  94  4.4   |  24  |  1.40<H>    Ca    9.3      08 Sep 2017 07:40  Mg     2.2     09-08    TPro  8.1  /  Alb  3.8  /  TBili  0.3  /  DBili  x   /  AST  18  /  ALT  10  /  AlkPhos  93  09-07    PT/INR - ( 08 Sep 2017 07:40 )   PT: 12.9 sec;   INR: 1.16          PTT - ( 08 Sep 2017 07:40 )  PTT:34.2 sec  Urinalysis Basic - ( 08 Sep 2017 00:37 )    Color: Yellow / Appearance: Clear / SG: <=1.005 / pH: x  Gluc: x / Ketone: NEGATIVE  / Bili: NEGATIVE / Urobili: 0.2 E.U./dL   Blood: x / Protein: NEGATIVE mg/dL / Nitrite: NEGATIVE   Leuk Esterase: NEGATIVE / RBC: x / WBC x   Sq Epi: x / Non Sq Epi: x / Bacteria: x 57 year old F w/ PMH of recently diagnosed metastatic colon cancer, HTN, HLD, presenting to [facility] ED with abdominal discomfort and constipation x1 week was found to have partial obstruction of sigmoid colon and left hydronephrosis on imaging.     O/N Events: DANIEL  Subjective/ROS:   has mild LLQ abd pain  no back or flank pain  (+) flatus  no BMs    ROS   Denies HA, CP, SOB, n/v, fevers, chills, cough, dysuria,      12pt ROS otherwise negative.    VITALS  Vital Signs Last 24 Hrs  T(C): 36.8 (08 Sep 2017 09:29), Max: 37.1 (07 Sep 2017 21:24)  T(F): 98.2 (08 Sep 2017 09:29), Max: 98.8 (07 Sep 2017 21:24)  HR: 87 (08 Sep 2017 09:29) (68 - 96)  BP: 136/86 (08 Sep 2017 09:29) (108/69 - 168/96)  BP(mean): --  RR: 14 (08 Sep 2017 09:29) (14 - 18)  SpO2: 100% (08 Sep 2017 09:29) (97% - 100%)    CAPILLARY BLOOD GLUCOSE  93 (08 Sep 2017 10:56)          PHYSICAL EXAM  General: A&Ox3; NAD  Head: NC/AT; MMM; PERRL; EOMI;  Neck: Supple; no JVD  Respiratory: CTA B/L; no wheezes/crackles   Cardiovascular: Regular rhythm/rate; S1/S2 nml  Gastrointestinal: Soft; mild LLQ tender, no r/g , ND; normoactive BS  Extremities: WWP; no edema/cyanosis  Neurological:  CNII-XII grossly intact; 5/5 str all 4 ext, sens intact bl, no saddle anesthesia, no obvious focal deficits    MEDICATIONS  (STANDING):  simvastatin 40 milliGRAM(s) Oral at bedtime  amLODIPine   Tablet 5 milliGRAM(s) Oral daily  sodium chloride 0.9%. 1000 milliLiter(s) (100 mL/Hr) IV Continuous <Continuous>    MEDICATIONS  (PRN):  acetaminophen   Tablet. 650 milliGRAM(s) Oral every 6 hours PRN Moderate Pain (4 - 6)      No Known Allergies      LABS                        8.6    7.4   )-----------( 380      ( 08 Sep 2017 07:40 )             27.8     09-08    139  |  102  |  10  ----------------------------<  94  4.4   |  24  |  1.40<H>    Ca    9.3      08 Sep 2017 07:40  Mg     2.2     09-08    TPro  8.1  /  Alb  3.8  /  TBili  0.3  /  DBili  x   /  AST  18  /  ALT  10  /  AlkPhos  93  09-07    PT/INR - ( 08 Sep 2017 07:40 )   PT: 12.9 sec;   INR: 1.16          PTT - ( 08 Sep 2017 07:40 )  PTT:34.2 sec  Urinalysis Basic - ( 08 Sep 2017 00:37 )    Color: Yellow / Appearance: Clear / SG: <=1.005 / pH: x  Gluc: x / Ketone: NEGATIVE  / Bili: NEGATIVE / Urobili: 0.2 E.U./dL   Blood: x / Protein: NEGATIVE mg/dL / Nitrite: NEGATIVE   Leuk Esterase: NEGATIVE / RBC: x / WBC x   Sq Epi: x / Non Sq Epi: x / Bacteria: x

## 2017-09-08 NOTE — PROGRESS NOTE ADULT - PROBLEM SELECTOR PLAN 3
Patient with left sided hydronephrosis.   -plan for stent placement tomorrow as colonic mass obstructing left ureter  -f/u urology recs Patient with left sided hydronephrosis.   -d/w urology, pt to undergo LT PCN  -f/u urology recs

## 2017-09-08 NOTE — H&P ADULT - PROBLEM SELECTOR PLAN 4
-microcytic, Hgb 9.3, was 9.6 last month  -likely bleed from colonic mass  -maintain active type and screen, transfuse for goal >7  -f/u iron panel in AM -likely due to dehydration and poor PO intake as well as component of hydronephrosis although  -s/p 1L NS in ED, c/w NS @ 70 cc/hr

## 2017-09-08 NOTE — CONSULT NOTE ADULT - SUBJECTIVE AND OBJECTIVE BOX
HPI:  57 year old F with PMHx significant for Metastatic colon cancer, Pancreatic mass, HTN, HLD, Obesity, sent to the ED by her oncologist (Dr Urias) for evaluation of abdominal discomfort and constipation x1 week.  According to patient she has been having abdominal discomfort for a while, with associated weight loss 30lbs in 4 months, poor appetite, and had an EGD and colonoscopy done last week 17 which showed a near obstructing mass at the rectosigmoid - which they could not traverse with the colonoscope or gastroscope and it was biopsied. Since her colonoscopy she has not had a bowel movement and became worried so was sent in by her oncologist for a CT.  She denies any n/v/d, fever, chills, cough, recent travel.      PAST MEDICAL & SURGICAL HISTORY:  High cholesterol  HTN (hypertension)  Colon cancer  H/O  section      REVIEW OF SYSTEMS  Apart from items noted in the HPI a 10point ROS was performed and was negative  	    MEDICATIONS  (STANDING):  simvastatin 40 milliGRAM(s) Oral at bedtime  amLODIPine   Tablet 5 milliGRAM(s) Oral daily  sodium chloride 0.9%. 1000 milliLiter(s) (100 mL/Hr) IV Continuous <Continuous>    MEDICATIONS  (PRN):  acetaminophen   Tablet. 650 milliGRAM(s) Oral every 6 hours PRN Moderate Pain (4 - 6)      Allergies  No Known Allergies    Intolerances      SOCIAL HISTORY:  Denies toxic or illicit habits    FAMILY HISTORY:  Sister with colon cancer at age 49      Vital Signs Last 24 Hrs  T(C): 36.8 (08 Sep 2017 14:09), Max: 37.1 (07 Sep 2017 21:24)  T(F): 98.3 (08 Sep 2017 14:09), Max: 98.8 (07 Sep 2017 21:24)  HR: 70 (08 Sep 2017 14:09) (68 - 96)  BP: 136/86 (08 Sep 2017 14:09) (108/69 - 168/96)  BP(mean): --  RR: 15 (08 Sep 2017 14:09) (14 - 18)  SpO2: 99% (08 Sep 2017 14:09) (97% - 100%)    PHYSICAL EXAM:  GEN - middle aged F lying in bed in no obvious distress, anicteric, afebrile, not pale  Neck: supple  Respiratory: clear to auscultation  Cardiovascular: s1 s2 no M RRR  Gastrointestinal: full, soft, BS+, NT, NR, NG, no masses or organs palapted  Rectal: deferred at patients request  Neurological: AAOx3 non focal      LABS:                        8.6    7.4   )-----------( 380      ( 08 Sep 2017 07:40 )             27.8         139  |  102  |  10  ----------------------------<  94  4.4   |  24  |  1.40<H>    Ca    9.3      08 Sep 2017 07:40  Mg     2.2         TPro  8.1  /  Alb  3.8  /  TBili  0.3  /  DBili  x   /  AST  18  /  ALT  10  /  AlkPhos  93      PT/INR - ( 08 Sep 2017 07:40 )   PT: 12.9 sec;   INR: 1.16          PTT - ( 08 Sep 2017 07:40 )  PTT:34.2 sec  Urinalysis Basic - ( 08 Sep 2017 00:37 )    Color: Yellow / Appearance: Clear / SG: <=1.005 / pH: x  Gluc: x / Ketone: NEGATIVE  / Bili: NEGATIVE / Urobili: 0.2 E.U./dL   Blood: x / Protein: NEGATIVE mg/dL / Nitrite: NEGATIVE   Leuk Esterase: NEGATIVE / RBC: x / WBC x   Sq Epi: x / Non Sq Epi: x / Bacteria: x      RADIOLOGY & ADDITIONAL STUDIES:  CT Abdomen and Pelvis w/ Oral Cont and w/ IV Cont (17 @ 15:36)   IMPRESSION:   1. Multiple lung nodules highly suggestive of lung metastases.  2. Lesion in segment 4 of liver highly suggestive of metastasis.  3. Solid tumor in the pancreatic body with upstream dilatation of   pancreatic duct and atrophy suspect underlying pancreatic adenocarcinoma.  4. Bulky tumor mass involving the proximal sigmoid suggestive of primary   adenocarcinoma with extracolonic extension and obstruction of the left   pelvic ureter causing left hydronephrosis and hydroureter.

## 2017-09-08 NOTE — PROGRESS NOTE ADULT - PROBLEM SELECTOR PLAN 6
-possibly with second malignancy given atrophy in pancreas and mass  -unclear if metastases are from colon or pancreas  -will send CEA and CA 19-9 -possibly with second malignancy given atrophy in pancreas and mass  -unclear if metastases are from colon or pancreas  -will send CEA and CA 19-9  f/u PET/CT   onc recs

## 2017-09-08 NOTE — CONSULT NOTE ADULT - SUBJECTIVE AND OBJECTIVE BOX
HPI: 56 yo female with likely met colon Ca. Noted on CT a/p  to have L hydro caused by sigmoid mass invading L pelvic ureter. Patient urinating OK but notices decrease in stream and frequency. No fever/chills. No dysuria/hematuria. +30lb weight loss since 2017. C/o some abdominal discomfort. No N/V. Decrease apeitite.     PAST MEDICAL & SURGICAL HISTORY:  High cholesterol  HTN (hypertension)  Colon cancer  H/O  section      MEDICATIONS  (STANDING):    MEDICATIONS  (PRN):      Allergies    No Known Allergies    Intolerances        SOCIAL HISTORY:    FAMILY HISTORY:      Vital Signs Last 24 Hrs  T(C): 37.1 (07 Sep 2017 21:24), Max: 37.1 (07 Sep 2017 21:24)  T(F): 98.8 (07 Sep 2017 21:24), Max: 98.8 (07 Sep 2017 21:24)  HR: 96 (07 Sep 2017 21:24) (96 - 96)  BP: 143/94 (07 Sep 2017 21:24) (143/94 - 143/94)  BP(mean): --  RR: 18 (07 Sep 2017 21:24) (18 - 18)  SpO2: 99% (07 Sep 2017 21:24) (99% - 99%)    On PE:  General: alert and awake  Abdomen: soft, ND, NT    : BRP, no CVAT    EXT: no calf tenderness    LABS:                        9.3    10.2  )-----------( 453      ( 07 Sep 2017 21:53 )             29.8         139  |  100  |  10  ----------------------------<  104<H>  4.7   |  23  |  1.50<H>    Ca    9.8      07 Sep 2017 21:53    TPro  8.1  /  Alb  3.8  /  TBili  0.3  /  DBili  x   /  AST  18  /  ALT  10  /  AlkPhos  93      PT/INR - ( 07 Sep 2017 21:53 )   PT: 12.6 sec;   INR: 1.13          PTT - ( 07 Sep 2017 21:53 )  PTT:28.9 sec      RADIOLOGY & ADDITIONAL STUDIES:< from: CT Abdomen and Pelvis w/ Oral Cont and w/ IV Cont (17 @ 15:36) >  IMPRESSION: Multiple lung nodules highly suggestive of lung metastases.  2. Lesion in segment 4 of liver highly suggestive of metastasis.  3. Solid tumor in the pancreatic body with upstream dilatation of   pancreatic duct and atrophy suspect underlying pancreatic adenocarcinoma.  4. Bulky tumor mass involving the proximal sigmoid suggestive of primary   adenocarcinoma with extracolonic extension and obstruction of the left   pelvic ureter causing left hydronephrosis and hydroureter.

## 2017-09-08 NOTE — H&P ADULT - HISTORY OF PRESENT ILLNESS
57 year old F w/ PMH of recently diagnosed metastatic colon cancer, HTN, HLD, presenting to [facility] ED with abdominal discomfort and constipation x1 week, sent in by oncologist Dr. Urias. Since March of 2017, patient has had a 30 lb weight loss with diarrhea, decreased appetite and abdominal discomfort. She initially thought it was IBS but had a colonoscopy last week (after 3 attempts, first 2 attempts with poor prep) and was found to have a mass in her colon and biopsies were taken. She was regularly getting her colonoscopies and had a normal one two years ago. She had a CT abd/pelvis done 9/2 which showed metastatic disease with metastases in the liver and lung. She was also found to have a mass in the pancreatic head concerning for a second malignancy. Since her colonoscopy last week she hasn't had a bowel movement. She was also found to have left hydroureteronephrosis on CT scan due to compression by the mass. She has noted a 6lb weight loss since last week. She has been experiencing some abdominal discomfort and noticed some decreased urinary frequency and stream. She came to the ED as Dr. Urias told her she would be a candidate for a left ureteral stent and a colonic stent. In the ED, T 98.8, HR 96, /94, RR 18, Sat 99%. She was given 1L NS and urology was consulted.

## 2017-09-08 NOTE — H&P ADULT - PROBLEM SELECTOR PLAN 6
-likely due to dehydration and poor PO intake as well as component of hydronephrosis although  -s/p 1L NS in ED, c/w NS @ 70 cc/hr -possibly with second malignancy given atrophy in pancreas and mass  -unclear if metastases are from colon or pancreas  -will send CEA and CA 19-9

## 2017-09-08 NOTE — H&P ADULT - PROBLEM SELECTOR PLAN 7
-plan for stent placement tomorrow as colonic mass obstructing left ureter  -f/u urology recs -microcytic, Hgb 9.3, was 9.6 last month  -likely bleed from colonic mass  -maintain active type and screen, transfuse for goal >7  -f/u iron panel in AM

## 2017-09-08 NOTE — PROGRESS NOTE ADULT - PROBLEM SELECTOR PLAN 1
Patient is c/o of Right sided abdominal  -likely secondary to colonic mass and constipation due to partial obstruction by mass  -also with decreased PO intake since last week  -no signs of acute abdomen  -Consulted Dr. Terrell regarding possible stent during this admission. likely secondary to colonic mass and constipation due to partial obstruction by mass  -no signs of acute abdomen  -Consulted Dr. Terrell regarding possible sigmoid stent during this admission.

## 2017-09-08 NOTE — PROGRESS NOTE ADULT - PROBLEM SELECTOR PLAN 7
-microcytic, Hgb 9.3, was 9.6 last month  -likely bleed from colonic mass  -maintain active type and screen, transfuse for goal >7  -Patient appears Iron down, will give Ferrlecit during this admission pending H/O rec's otherwise.

## 2017-09-08 NOTE — H&P ADULT - ASSESSMENT
57 year old F w/ PMH of recently diagnosed metastatic colon cancer, HTN, HLD, presenting to [facility] ED with abdominal discomfort and constipation x1 week

## 2017-09-08 NOTE — H&P ADULT - NSHPLABSRESULTS_GEN_ALL_CORE
LABS:                        9.3    10.2  )-----------( 453      ( 07 Sep 2017 21:53 )             29.8     09-07    139  |  100  |  10  ----------------------------<  104<H>  4.7   |  23  |  1.50<H>    Ca    9.8      07 Sep 2017 21:53    TPro  8.1  /  Alb  3.8  /  TBili  0.3  /  DBili  x   /  AST  18  /  ALT  10  /  AlkPhos  93  09-07    PT/INR - ( 07 Sep 2017 21:53 )   PT: 12.6 sec;   INR: 1.13          PTT - ( 07 Sep 2017 21:53 )  PTT:28.9 sec    CAPILLARY BLOOD GLUCOSE

## 2017-09-08 NOTE — CONSULT NOTE ADULT - ASSESSMENT
57 year old F with PMH of HTN and HLD with newly diagnosed colorectal adenocarcinoma significant for colonic obstruction and hydronephrosis, pancreatic mass and concern for metastatic disease given lung and liver masses admitted from outpatient clinic for malignancy work up and symptomatic treatment.

## 2017-09-08 NOTE — PROGRESS NOTE ADULT - PROBLEM SELECTOR PLAN 5
-w/ pitting edema on exam and hypercoagulability given active cancer  -lovenox 40 mg daily  -Patient to get LE Doppler following PET-CT today

## 2017-09-08 NOTE — H&P ADULT - PROBLEM SELECTOR PLAN 1
-likely secondary to colonic mass and constipation due to partial obstruction by mass  -also with decreased PO intake since last week  -no signs of acute abdomen  -will treat with tylenol and plan for colonic stent this admission  -NPO for now

## 2017-09-08 NOTE — PROGRESS NOTE ADULT - PROBLEM SELECTOR PLAN 2
Patient follows up with Dr. Urias as an OPD. Was diagnosed last week on colonoscopy and CT scan  -awaiting final biopsy results  -PET-CT was performed this AM  -Patient will get possible biopsy of Pancreatic lesion pending PET Avid nodule or not. Patient follows  with Dr. Urias   Was diagnosed last week on colonoscopy and CT scan  -awaiting final biopsy results  -PET-CT was performed this AM  -Patient will get possible biopsy of Pancreatic lesion pending PET Avid nodule or not. Patient follows  with Dr. Urias   Was diagnosed last week on colonoscopy and CT scan  -awaiting final biopsy results  -PET-CT was performed this AM  -Patient will get possible biopsy of Pancreatic lesion pending PET Avid nodule or not.  depending on GI's ability to stent (or not), pt may need gen surgery eval (Dr Greene's  service) for resection of mass.

## 2017-09-08 NOTE — H&P ADULT - PROBLEM SELECTOR PLAN 5
-w/ pitting edema on exam and hypercoagulability given active cancer  -lovenox 40 mg daily  -f/u b/l LE Doppler

## 2017-09-09 LAB
AFP-TM SERPL-MCNC: 1.5 NG/ML — SIGNIFICANT CHANGE UP
ANION GAP SERPL CALC-SCNC: 13 MMOL/L — SIGNIFICANT CHANGE UP (ref 5–17)
BUN SERPL-MCNC: 7 MG/DL — SIGNIFICANT CHANGE UP (ref 7–23)
CALCIUM SERPL-MCNC: 9 MG/DL — SIGNIFICANT CHANGE UP (ref 8.4–10.5)
CANCER AG19-9 SERPL-ACNC: <1 U/ML — SIGNIFICANT CHANGE UP
CHLORIDE SERPL-SCNC: 105 MMOL/L — SIGNIFICANT CHANGE UP (ref 96–108)
CO2 SERPL-SCNC: 23 MMOL/L — SIGNIFICANT CHANGE UP (ref 22–31)
CREAT SERPL-MCNC: 1.3 MG/DL — SIGNIFICANT CHANGE UP (ref 0.5–1.3)
GLUCOSE SERPL-MCNC: 90 MG/DL — SIGNIFICANT CHANGE UP (ref 70–99)
HCT VFR BLD CALC: 26.8 % — LOW (ref 34.5–45)
HGB BLD-MCNC: 8.3 G/DL — LOW (ref 11.5–15.5)
MAGNESIUM SERPL-MCNC: 2.1 MG/DL — SIGNIFICANT CHANGE UP (ref 1.6–2.6)
MCHC RBC-ENTMCNC: 21.3 PG — LOW (ref 27–34)
MCHC RBC-ENTMCNC: 31 G/DL — LOW (ref 32–36)
MCV RBC AUTO: 68.7 FL — LOW (ref 80–100)
PLATELET # BLD AUTO: 384 K/UL — SIGNIFICANT CHANGE UP (ref 150–400)
POTASSIUM SERPL-MCNC: 4.8 MMOL/L — SIGNIFICANT CHANGE UP (ref 3.5–5.3)
POTASSIUM SERPL-SCNC: 4.8 MMOL/L — SIGNIFICANT CHANGE UP (ref 3.5–5.3)
RBC # BLD: 3.9 M/UL — SIGNIFICANT CHANGE UP (ref 3.8–5.2)
RBC # FLD: 17.3 % — HIGH (ref 10.3–16.9)
SODIUM SERPL-SCNC: 141 MMOL/L — SIGNIFICANT CHANGE UP (ref 135–145)
WBC # BLD: 9.6 K/UL — SIGNIFICANT CHANGE UP (ref 3.8–10.5)
WBC # FLD AUTO: 9.6 K/UL — SIGNIFICANT CHANGE UP (ref 3.8–10.5)

## 2017-09-09 PROCEDURE — 99232 SBSQ HOSP IP/OBS MODERATE 35: CPT | Mod: GC

## 2017-09-09 RX ORDER — ENOXAPARIN SODIUM 100 MG/ML
40 INJECTION SUBCUTANEOUS ONCE
Qty: 0 | Refills: 0 | Status: COMPLETED | OUTPATIENT
Start: 2017-09-09 | End: 2017-09-09

## 2017-09-09 RX ORDER — SOD SULF/SODIUM/NAHCO3/KCL/PEG
4000 SOLUTION, RECONSTITUTED, ORAL ORAL ONCE
Qty: 0 | Refills: 0 | Status: COMPLETED | OUTPATIENT
Start: 2017-09-10 | End: 2017-09-10

## 2017-09-09 RX ORDER — POLYETHYLENE GLYCOL 3350 17 G/17G
17 POWDER, FOR SOLUTION ORAL
Qty: 0 | Refills: 0 | Status: DISCONTINUED | OUTPATIENT
Start: 2017-09-09 | End: 2017-09-14

## 2017-09-09 RX ORDER — SODIUM FERRIC GLUCONAT/SUCROSE 62.5MG/5ML
125 AMPUL (ML) INTRAVENOUS DAILY
Qty: 0 | Refills: 0 | Status: COMPLETED | OUTPATIENT
Start: 2017-09-09 | End: 2017-09-13

## 2017-09-09 RX ORDER — PANTOPRAZOLE SODIUM 20 MG/1
40 TABLET, DELAYED RELEASE ORAL ONCE
Qty: 0 | Refills: 0 | Status: COMPLETED | OUTPATIENT
Start: 2017-09-09 | End: 2017-09-09

## 2017-09-09 RX ADMIN — PANTOPRAZOLE SODIUM 40 MILLIGRAM(S): 20 TABLET, DELAYED RELEASE ORAL at 21:45

## 2017-09-09 RX ADMIN — Medication 110 MILLIGRAM(S): at 17:49

## 2017-09-09 RX ADMIN — SIMVASTATIN 40 MILLIGRAM(S): 20 TABLET, FILM COATED ORAL at 21:45

## 2017-09-09 RX ADMIN — Medication 650 MILLIGRAM(S): at 07:40

## 2017-09-09 RX ADMIN — Medication 650 MILLIGRAM(S): at 08:40

## 2017-09-09 RX ADMIN — ENOXAPARIN SODIUM 40 MILLIGRAM(S): 100 INJECTION SUBCUTANEOUS at 18:16

## 2017-09-09 RX ADMIN — Medication 650 MILLIGRAM(S): at 17:10

## 2017-09-09 RX ADMIN — POLYETHYLENE GLYCOL 3350 17 GRAM(S): 17 POWDER, FOR SOLUTION ORAL at 18:17

## 2017-09-09 RX ADMIN — Medication 650 MILLIGRAM(S): at 16:39

## 2017-09-09 RX ADMIN — POLYETHYLENE GLYCOL 3350 17 GRAM(S): 17 POWDER, FOR SOLUTION ORAL at 11:55

## 2017-09-09 RX ADMIN — AMLODIPINE BESYLATE 5 MILLIGRAM(S): 2.5 TABLET ORAL at 06:00

## 2017-09-09 NOTE — PROGRESS NOTE ADULT - PROBLEM SELECTOR PLAN 2
Severely iron deficient with low iron %, total and ferritin, Hg 8.6, MCV 68.5, likely related to blood loss. No clinical signs of hemolysis, normal bilirubin.     -IV iron supplementation daily  - pending B12, folate. check daily retic count

## 2017-09-09 NOTE — PROGRESS NOTE ADULT - ASSESSMENT
57 year old F w/ PMH of recently diagnosed metastatic colon cancer, HTN, HLD, presenting to [facility] ED with abdominal discomfort and constipation x1 week

## 2017-09-09 NOTE — PROGRESS NOTE ADULT - PROBLEM SELECTOR PLAN 10
-c/w simvastatin 40 mg daily    Diet: NPO for procedure otherwise Reg Diet  DVT prophylaxis: hold lovenox pending procedure  FULL CODE  Continue on RMF -c/w simvastatin 40 mg daily    Diet: Clear Liquid  DVT prophylaxis: hold lovenox pending procedure  FULL CODE  Continue on RMF

## 2017-09-09 NOTE — PROGRESS NOTE ADULT - PROBLEM SELECTOR PLAN 6
-possibly with second malignancy given atrophy in pancreas and mass  -unclear if metastases are from colon or pancreas  -will send CEA and CA 19-9  f/u PET/CT   onc recs -possibly with second malignancy given atrophy in pancreas and mass  -unclear if metastases are from colon or pancreas  -will send CEA and CA 19-9  onc recs

## 2017-09-09 NOTE — PROGRESS NOTE ADULT - SUBJECTIVE AND OBJECTIVE BOX
No obstruction clinically No obstruction clinically  On Miralax  On clear liquid diet    Abd benign    Near-obstructing colon cancer.  No complete obstruction on CT scans  Plan for colonic stent. No role for colostomy at this point.  Can advance diet to low residue.

## 2017-09-09 NOTE — PROGRESS NOTE ADULT - PROBLEM SELECTOR PLAN 4
Patient with Cr 1.4 on admission   -likely due to dehydration and poor PO intake as well as component of hydronephrosis    -s/p 1L NS in ED, c/w NS @ 70 cc/hr

## 2017-09-09 NOTE — PROGRESS NOTE ADULT - PROBLEM SELECTOR PLAN 1
Suspicion for metastatic colon cancer given liver and lung masses biopsy consistent with adenocarcinoma, 6.4cm on CT imaging with compression of left ureter causing hydronephrosis and colonic obstruction. Of note pancreatic mass 2.4cm is suspicious could be second primary vs. metastasis as found to be PET avid. Urology, GI, and surgery on board. Overall patient has good performance status and will likely be a good candidate for chemotherapy. However, must delineate whether this is unresectable pancreatic ca versus Stage IV colon adenoca as prognosis will be dramatically different with/without chemotherapy    -f/u PET/CT scan for staging  CEA elevated  -will likely need liver biospy and tissue for molecular markers  -colonic stent vs. colostomy to relieve obstruction- f/u surgery and GI recs, may need to present case at GI tumor board (upcoming Wed)  - f/u urology recs for nephrostomy tube  - case discussed with Dr Urias.

## 2017-09-09 NOTE — PROGRESS NOTE ADULT - SUBJECTIVE AND OBJECTIVE BOX
57 year old F w/ PMH of recently diagnosed metastatic colon cancer, HTN, HLD, presenting to [facility] ED with abdominal discomfort and constipation x1 week    O/N Events: Patient had placement of Left Nephrostomy Tube  Subjective/ROS: Patient states that overnight she had mild discomfort with Nephrostomy tube but it is getting better.  Denies HA, CP, SOB, n/v, changes in bowel/urinary habits.  12pt ROS otherwise negative.    VITALS  Vital Signs Last 24 Hrs  T(C): 36.8 (08 Sep 2017 21:00), Max: 36.8 (08 Sep 2017 14:09)  T(F): 98.2 (08 Sep 2017 21:00), Max: 98.3 (08 Sep 2017 14:09)  HR: 86 (08 Sep 2017 21:00) (66 - 86)  BP: 109/73 (08 Sep 2017 21:00) (109/73 - 171/79)  BP(mean): --  RR: 16 (08 Sep 2017 21:00) (15 - 16)  SpO2: 99% (08 Sep 2017 21:00) (99% - 99%)    CAPILLARY BLOOD GLUCOSE    PHYSICAL EXAM  General: A&Ox3; NAD  Head: NC/AT; MMM; PERRL; EOMI;  Neck: Supple; no JVD  Respiratory: CTA B/L; no wheezes/crackles   Cardiovascular: Regular rhythm/rate; S1/S2, ro rubs, murmurs, or gallops  Back: Presence of Left Nephrostomy tube, dressing is dry. Draining clear fluid  Gastrointestinal: Soft; NTND; normoactive BS  Extremities: WWP; no edema/cyanosis  Neurological:  CNII-XII grossly intact; no obvious focal deficits    MEDICATIONS  (STANDING):  simvastatin 40 milliGRAM(s) Oral at bedtime  amLODIPine   Tablet 5 milliGRAM(s) Oral daily  sodium chloride 0.9%. 1000 milliLiter(s) (100 mL/Hr) IV Continuous <Continuous>  polyethylene glycol 3350 17 Gram(s) Oral daily  sodium ferric gluconate complex IVPB      sodium ferric gluconate complex IVPB 100 milliGRAM(s) IV Intermittent once    MEDICATIONS  (PRN):  acetaminophen   Tablet. 650 milliGRAM(s) Oral every 6 hours PRN Moderate Pain (4 - 6)      No Known Allergies      LABS                        8.3    9.6   )-----------( 384      ( 09 Sep 2017 07:02 )             26.8     09-09    141  |  105  |  7   ----------------------------<  90  4.8   |  23  |  1.30    Ca    9.0      09 Sep 2017 07:02  Mg     2.1     09-09    TPro  8.1  /  Alb  3.8  /  TBili  0.3  /  DBili  x   /  AST  18  /  ALT  10  /  AlkPhos  93  09-07    PT/INR - ( 08 Sep 2017 07:40 )   PT: 12.9 sec;   INR: 1.16          PTT - ( 08 Sep 2017 07:40 )  PTT:34.2 sec  Urinalysis Basic - ( 08 Sep 2017 00:37 )    Color: Yellow / Appearance: Clear / SG: <=1.005 / pH: x  Gluc: x / Ketone: NEGATIVE  / Bili: NEGATIVE / Urobili: 0.2 E.U./dL   Blood: x / Protein: NEGATIVE mg/dL / Nitrite: NEGATIVE   Leuk Esterase: NEGATIVE / RBC: x / WBC x   Sq Epi: x / Non Sq Epi: x / Bacteria: x          PET-CT: Shows PET avid Sigmoid Mass, Pancreatic Mass, Liver Mass, and Lung Masses

## 2017-09-09 NOTE — PROGRESS NOTE ADULT - ASSESSMENT
56 YO F with recent diagnosis of metastatic colon cancer and pancreatic mass with h/o HTN, HLD, obesity was sent to the ED by her oncologist (Dr Urias) for evaluation of abdominal discomfort and constipation x1 week found to have a bulky proximal sigmoid tumor causing L hydrureter/hydronephrosis s/p IR percutaneous nephrostomy.    PLAN -   # Metastatic colon ca with near colonic obstruction  - Plan for colonic stent placement on Monday  - D/t prior history of poor prep prior to colonoscopy, likely d/t colonic mass, would recommend initiating Miralax 17 g BID  - C/w clear liquid diet  - Pt to receive 2 L of golytely at 7 PM Sunday evening and 2 L on Monday starting at 6 AM  - F/u PET scan  - F/u Heme/Onc    # Pancreatic and liver mass -   - F/u PET scan  - If concern for primary mass will consider EUS with FNA/biopsy    GI following

## 2017-09-09 NOTE — PROGRESS NOTE ADULT - PROBLEM SELECTOR PLAN 2
Patient follows  with Dr. Urias   Was diagnosed last week on colonoscopy and CT scan  -awaiting final biopsy results  -PET-CT was performed this AM  -Patient will get possible biopsy of Pancreatic lesion pending PET Avid nodule or not.  depending on GI's ability to stent (or not), pt may need gen surgery eval (Dr Greene's  service) for resection of mass. Patient follows  with Dr. Urias   Was diagnosed last week on colonoscopy and CT scan  -awaiting final biopsy results  -PET-CT was performed showed avid masses in colon, liver, pancreas and lungs  -Patient will get possible biopsy of Pancreatic lesion   -GI to stent Monday

## 2017-09-09 NOTE — PROGRESS NOTE ADULT - SUBJECTIVE AND OBJECTIVE BOX
Pt seen and examined at bedside. DANIEL overnight. Pt states that she has not had a BM since 8/31 after her last colonoscopy, however, is passing gas. Currently on clear liquid diet which she is tolerating well, denies abdominal pain, nausea, vomiting, melena, hematochezia, or diarrhea.     Allergies    No Known Allergies    MEDICATIONS:  MEDICATIONS  (STANDING):  simvastatin 40 milliGRAM(s) Oral at bedtime  amLODIPine   Tablet 5 milliGRAM(s) Oral daily  sodium chloride 0.9%. 1000 milliLiter(s) (100 mL/Hr) IV Continuous <Continuous>  polyethylene glycol 3350 17 Gram(s) Oral two times a day  enoxaparin Injectable 40 milliGRAM(s) SubCutaneous once  sodium ferric gluconate complex IVPB 125 milliGRAM(s) IV Intermittent daily    MEDICATIONS  (PRN):  acetaminophen   Tablet. 650 milliGRAM(s) Oral every 6 hours PRN Moderate Pain (4 - 6)    Vital Signs Last 24 Hrs  T(C): 36.8 (09 Sep 2017 16:30), Max: 36.8 (08 Sep 2017 21:00)  T(F): 98.2 (09 Sep 2017 16:30), Max: 98.2 (08 Sep 2017 21:00)  HR: 78 (09 Sep 2017 16:30) (66 - 86)  BP: 151/81 (09 Sep 2017 16:30) (109/73 - 171/79)  BP(mean): --  RR: 19 (09 Sep 2017 16:30) (14 - 19)  SpO2: 100% (09 Sep 2017 16:30) (99% - 100%)    09-08 @ 07:01  -  09-09 @ 07:00  --------------------------------------------------------  IN: 1700 mL / OUT: 825 mL / NET: 875 mL    09-09 @ 07:01  -  09-09 @ 17:42  --------------------------------------------------------  IN: 700 mL / OUT: 300 mL / NET: 400 mL    PHYSICAL EXAM:    General: Well developed; well nourished; in no acute distress  HEENT: MMM, conjunctiva and sclera clear  Gastrointestinal: Soft non-tender non-distended; No rebound or guarding  Skin: Warm and dry. No obvious rash    LABS:  CBC Full  -  ( 09 Sep 2017 07:02 )  WBC Count : 9.6 K/uL  Hemoglobin : 8.3 g/dL  Hematocrit : 26.8 %  Platelet Count - Automated : 384 K/uL  Mean Cell Volume : 68.7 fL  Mean Cell Hemoglobin : 21.3 pg  Mean Cell Hemoglobin Concentration : 31.0 g/dL    09-09    141  |  105  |  7   ----------------------------<  90  4.8   |  23  |  1.30    Ca    9.0      09 Sep 2017 07:02  Mg     2.1     09-09      RADIOLOGY & ADDITIONAL STUDIES: Reviewed

## 2017-09-09 NOTE — PROGRESS NOTE ADULT - SUBJECTIVE AND OBJECTIVE BOX
Hematology Oncology Consult Note (Dr. Urias)     Note per outpatient clinic visit  57 year old F with PMH of HTN and HLD  w/ sigmoid mass bx confirmed adenocarcinoma, w/ imaging showing findings of pancreatic mass and liver/lung mets, admitted for worsening Cr due to obstructive L hydronephrosis and hydroureter due to compression of mass s/p L nephrostomy tube.    Interval hx:  no complaints, no BM yet, passing gas. denies nausea, no abd pain. PET/CT completed yesterday and pancreatic lesion found to be PET avid.    ROS is otherwise negative.    PHYSICAL EXAM:    Vital Signs Last 24 Hrs  T(C): 36.8 (09 Sep 2017 16:30), Max: 36.8 (08 Sep 2017 21:00)  T(F): 98.2 (09 Sep 2017 16:30), Max: 98.2 (08 Sep 2017 21:00)  HR: 78 (09 Sep 2017 16:30) (66 - 86)  BP: 151/81 (09 Sep 2017 16:30) (109/73 - 171/79)  RR: 19 (09 Sep 2017 16:30) (14 - 19)  SpO2: 100% (09 Sep 2017 16:30) (99% - 100%)      Exam:     General: A&Ox3; NAD, morbidly obese  Head: NC/AT; MMM; PERRL; EOMI;  Neck: Supple; no JVD  Respiratory: CTA B/L; no wheezes/crackles   Cardiovascular: Regular rhythm/rate; S1/S2, ro rubs, murmurs, or gallops  Back: Presence of Left Nephrostomy tube, dressing is dry. Draining clear fluid  Gastrointestinal: Soft; NTND; normoactive BS  Extremities: WWP; no edema/cyanosis  Neurological:  CNII-XII grossly intact; no obvious focal deficits        Labs:                          8.3    9.6   )-----------( 384      ( 09 Sep 2017 07:02 )             26.8         CBC Full  -  ( 09 Sep 2017 07:02 )  WBC Count : 9.6 K/uL  Hemoglobin : 8.3 g/dL  Hematocrit : 26.8 %  Platelet Count - Automated : 384 K/uL  Mean Cell Volume : 68.7 fL  Mean Cell Hemoglobin : 21.3 pg  Mean Cell Hemoglobin Concentration : 31.0 g/dL    09-09    141  |  105  |  7   ----------------------------<  90  4.8   |  23  |  1.30    Ca    9.0      09 Sep 2017 07:02  Mg     2.1     09-09    TPro  8.1  /  Alb  3.8  /  TBili  0.3  /  DBili  x   /  AST  18  /  ALT  10  /  AlkPhos  93  09-07        PT/INR - ( 08 Sep 2017 07:40 )   PT: 12.9 sec;   INR: 1.16          PTT - ( 08 Sep 2017 07:40 )  PTT:34.2 sec          CT Abd/Pelvis w/wo Contrast: 9/2     IMPRESSION: Multiple lung nodules highly suggestive of lung metastases.  2. Lesion in segment 4 of liver highly suggestive of metastasis.  3. Solid tumor in the pancreatic body with upstream dilatation of   pancreatic duct and atrophy suspect underlying pancreatic adenocarcinoma.  4. Bulky tumor mass involving the proximal sigmoid suggestive of primary   adenocarcinoma with extracolonic extension and obstruction of the left   pelvic ureter causing left hydronephrosis and hydroureter.    Ultrasound legs bilat 9/8   IMPRESSION:  No deep vein thrombosis seen.

## 2017-09-09 NOTE — PROGRESS NOTE ADULT - PROBLEM SELECTOR PLAN 1
likely secondary to colonic mass and constipation due to partial obstruction by mass  -no signs of acute abdomen  -Patient put on Miralax 17g BID  -Clear Liquid Diet this weekend, likely secondary to colonic mass and constipation due to partial obstruction by mass  -no signs of acute abdomen  -Patient put on Miralax 17g BID  -Clear Liquid Diet this weekend,  -Golytely planning Sunday 2L 7p, Monday 2L 6a

## 2017-09-09 NOTE — PROGRESS NOTE ADULT - PROBLEM SELECTOR PLAN 5
-w/ pitting edema on exam and hypercoagulability given active cancer  -lovenox 40 mg daily  -Patient to get LE Doppler following PET-CT today -w/ pitting edema on exam and hypercoagulability given active cancer. Doppler negative for DVT  -lovenox 40 mg daily

## 2017-09-09 NOTE — PROGRESS NOTE ADULT - PROBLEM SELECTOR PLAN 3
Patient with left sided hydronephrosis.   -d/w urology, pt to undergo LT PCN  -f/u urology recs Patient with left sided hydronephrosis. S/P LT PCN.   -Continue to monitor Ouput

## 2017-09-10 LAB
ANION GAP SERPL CALC-SCNC: 13 MMOL/L — SIGNIFICANT CHANGE UP (ref 5–17)
BUN SERPL-MCNC: 6 MG/DL — LOW (ref 7–23)
CALCIUM SERPL-MCNC: 9.4 MG/DL — SIGNIFICANT CHANGE UP (ref 8.4–10.5)
CHLORIDE SERPL-SCNC: 106 MMOL/L — SIGNIFICANT CHANGE UP (ref 96–108)
CO2 SERPL-SCNC: 24 MMOL/L — SIGNIFICANT CHANGE UP (ref 22–31)
CREAT SERPL-MCNC: 1.2 MG/DL — SIGNIFICANT CHANGE UP (ref 0.5–1.3)
CULTURE RESULTS: NO GROWTH — SIGNIFICANT CHANGE UP
GLUCOSE SERPL-MCNC: 96 MG/DL — SIGNIFICANT CHANGE UP (ref 70–99)
HCT VFR BLD CALC: 27.7 % — LOW (ref 34.5–45)
HGB BLD-MCNC: 8.6 G/DL — LOW (ref 11.5–15.5)
MCHC RBC-ENTMCNC: 21.3 PG — LOW (ref 27–34)
MCHC RBC-ENTMCNC: 31 G/DL — LOW (ref 32–36)
MCV RBC AUTO: 68.6 FL — LOW (ref 80–100)
PLATELET # BLD AUTO: 392 K/UL — SIGNIFICANT CHANGE UP (ref 150–400)
POTASSIUM SERPL-MCNC: 4.2 MMOL/L — SIGNIFICANT CHANGE UP (ref 3.5–5.3)
POTASSIUM SERPL-SCNC: 4.2 MMOL/L — SIGNIFICANT CHANGE UP (ref 3.5–5.3)
RBC # BLD: 4.04 M/UL — SIGNIFICANT CHANGE UP (ref 3.8–5.2)
RBC # FLD: 17.3 % — HIGH (ref 10.3–16.9)
SODIUM SERPL-SCNC: 143 MMOL/L — SIGNIFICANT CHANGE UP (ref 135–145)
SPECIMEN SOURCE: SIGNIFICANT CHANGE UP
WBC # BLD: 8.8 K/UL — SIGNIFICANT CHANGE UP (ref 3.8–10.5)
WBC # FLD AUTO: 8.8 K/UL — SIGNIFICANT CHANGE UP (ref 3.8–10.5)

## 2017-09-10 PROCEDURE — 99232 SBSQ HOSP IP/OBS MODERATE 35: CPT

## 2017-09-10 RX ADMIN — Medication 4000 MILLILITER(S): at 19:11

## 2017-09-10 RX ADMIN — AMLODIPINE BESYLATE 5 MILLIGRAM(S): 2.5 TABLET ORAL at 06:31

## 2017-09-10 RX ADMIN — SIMVASTATIN 40 MILLIGRAM(S): 20 TABLET, FILM COATED ORAL at 22:38

## 2017-09-10 RX ADMIN — SODIUM CHLORIDE 100 MILLILITER(S): 9 INJECTION INTRAMUSCULAR; INTRAVENOUS; SUBCUTANEOUS at 14:37

## 2017-09-10 RX ADMIN — Medication 110 MILLIGRAM(S): at 12:37

## 2017-09-10 RX ADMIN — POLYETHYLENE GLYCOL 3350 17 GRAM(S): 17 POWDER, FOR SOLUTION ORAL at 06:31

## 2017-09-10 RX ADMIN — SODIUM CHLORIDE 100 MILLILITER(S): 9 INJECTION INTRAMUSCULAR; INTRAVENOUS; SUBCUTANEOUS at 03:38

## 2017-09-10 RX ADMIN — POLYETHYLENE GLYCOL 3350 17 GRAM(S): 17 POWDER, FOR SOLUTION ORAL at 18:32

## 2017-09-10 NOTE — PROGRESS NOTE ADULT - ASSESSMENT
57 year old F w/ PMH of recently diagnosed metastatic colon cancer, HTN, HLD, presenting to [facility] ED with abdominal discomfort and constipation x1 week      #Abdominal pain.  Resolved today.  likely secondary to colonic mass and constipation due to partial obstruction by mass, improved likely from miralax  -no signs of acute abdomen  -Patient put on Miralax 17g BID  -Clear Liquid Diet this weekend,  -Golytely planning Sunday 2L 7p, Monday 2L 6a. likely secondary to colonic mass and constipation due to partial obstruction by mass  -no signs of acute abdomen  -Patient put on Miralax 17g BID  -Clear Liquid Diet this weekend, -NPO after midnight for colonoscopy with stent to relieve obstruction    #Metastatic colon cancer in female.  Plan: Patient follows  with Dr. Urias   Was diagnosed last week on colonoscopy and CT scan. awaiting final biopsy results. PET-CT was performed showed avid masses in colon, liver, pancreas and lungs    Was diagnosed last week on colonoscopy and CT scan  -awaiting final biopsy results  -PET-CT was performed this AM  -Patient will get possible biopsy of Pancreatic lesion pending PET Avid nodule or not.  depending on GI's ability to stent (or not), pt may need gen surgery eval (Dr Greene's  service) for resection of mass. -Golytely planning Sunday 2L 7p, Monday 2L 6a. likely secondary to colonic mass and constipation due to partial obstruction by mass  -no signs of acute abdomen  -Patient put on Miralax 17g BID  -Clear Liquid Diet this weekend, -NPO after midnight for colonoscopy with stent to relieve obstruction    # CATRACHITA (acute kidney injury).   Patient with Cr 1.4 on admission now normalized   -likely due to dehydration and poor PO intake as well as component of hydronephrosis        # Pancreatic abnormality. Plan: -possibly with second malignancy given atrophy in pancreas and mass  -unclear if metastases are from colon or pancreas  -will send CEA and CA 19-9  onc recs. -possibly with second malignancy given atrophy in pancreas and mass  -unclear if metastases are from colon or pancreas  -will send CEA and CA 19-9  f/u PET/CT   onc recs    Diet: Clear Liquid  DVT prophylaxis: hold lovenox pending procedure  FULL CODE  Continue on RMF. -c/w simvastatin 40 mg daily

## 2017-09-10 NOTE — PROGRESS NOTE ADULT - SUBJECTIVE AND OBJECTIVE BOX
Feelign well, had some liquid stool today, which is first bm in 1 week  no abd pain or n/v, no f/c or bleeding    INTERVAL HPI/OVERNIGHT EVENTS:    Review of Systems: 12 point review of systems otherwise negative  ( - )fevers/chills  ( - ) dyspnea  ( - ) cough  ( - ) chest pain  ( - ) palpatations  ( - ) dizziness/lightheadedness  ( - ) nausea/vomiting  ( - ) abd pain  ( - ) diarrhea  ( - ) melena  ( - ) hematochezia  ( - ) dysuria  ( - ) hematuria  ( - ) leg swelling  ( -) calf tenderness  ( - ) motor weakness  ( - ) extremity numbness  ( - ) back pain  ( + ) tolerating POs  ( + ) BM    MEDICATIONS  (STANDING):  simvastatin 40 milliGRAM(s) Oral at bedtime  amLODIPine   Tablet 5 milliGRAM(s) Oral daily  sodium chloride 0.9%. 1000 milliLiter(s) (100 mL/Hr) IV Continuous <Continuous>  polyethylene glycol 3350 17 Gram(s) Oral two times a day  polyethylene glycol/electrolyte Solution. 4000 milliLiter(s) Oral once  sodium ferric gluconate complex IVPB 125 milliGRAM(s) IV Intermittent daily    MEDICATIONS  (PRN):  acetaminophen   Tablet. 650 milliGRAM(s) Oral every 6 hours PRN Moderate Pain (4 - 6)      Allergies    No Known Allergies    Intolerances    Vital Signs Last 24 Hrs  T(C): 36.7 (10 Sep 2017 10:11), Max: 36.9 (09 Sep 2017 20:55)  T(F): 98.1 (10 Sep 2017 10:11), Max: 98.4 (09 Sep 2017 20:55)  HR: 102 (10 Sep 2017 10:11) (73 - 102)  BP: 132/84 (10 Sep 2017 10:11) (118/74 - 153/85)  BP(mean): --  RR: 16 (10 Sep 2017 10:11) (16 - 19)  SpO2: 98% (10 Sep 2017 10:11) (98% - 100%)  CAPILLARY BLOOD GLUCOSE          09-09 @ 07:01  -  09-10 @ 07:00  --------------------------------------------------------  IN: 2200 mL / OUT: 1350 mL / NET: 850 mL    09-10 @ 07:01  -  09-10 @ 14:52  --------------------------------------------------------  IN: 0 mL / OUT: 850 mL / NET: -850 mL    Physical Exam:    Daily     Daily   General:  Well appearing, NAD, not cachetic  HEENT:  Nonicteric, PERRLA  CV:  RRR, no murmur, no JVD  Lungs:  CTA B/L, no wheezes, rales, rhonchi  Abdomen:  Soft, non-tender, no distended, positive BS, no hepatosplenomegaly, subtle palpable abdom mass vs stool  Extremities:  2+ pulses, no c/c, no edema  Skin:  Warm and dry, no rashes  :  No roman  Neuro:  AAOx3, non-focal, CN II-XII grossly intact  No Restraints    LABS:                        8.6    8.8   )-----------( 392      ( 10 Sep 2017 06:45 )             27.7     09-10    143  |  106  |  6<L>  ----------------------------<  96  4.2   |  24  |  1.20    Ca    9.4      10 Sep 2017 06:45  Mg     2.1     09-09

## 2017-09-11 DIAGNOSIS — K56.69 OTHER INTESTINAL OBSTRUCTION: ICD-10-CM

## 2017-09-11 LAB
CULTURE RESULTS: SIGNIFICANT CHANGE UP
HCT VFR BLD CALC: 29.3 % — LOW (ref 34.5–45)
HGB BLD-MCNC: 9 G/DL — LOW (ref 11.5–15.5)
MCHC RBC-ENTMCNC: 21.1 PG — LOW (ref 27–34)
MCHC RBC-ENTMCNC: 30.7 G/DL — LOW (ref 32–36)
MCV RBC AUTO: 68.6 FL — LOW (ref 80–100)
PLATELET # BLD AUTO: 435 K/UL — HIGH (ref 150–400)
RBC # BLD: 4.27 M/UL — SIGNIFICANT CHANGE UP (ref 3.8–5.2)
RBC # FLD: 17.5 % — HIGH (ref 10.3–16.9)
WBC # BLD: 11.1 K/UL — HIGH (ref 3.8–10.5)
WBC # FLD AUTO: 11.1 K/UL — HIGH (ref 3.8–10.5)

## 2017-09-11 PROCEDURE — 45330 DIAGNOSTIC SIGMOIDOSCOPY: CPT

## 2017-09-11 PROCEDURE — 99233 SBSQ HOSP IP/OBS HIGH 50: CPT

## 2017-09-11 RX ADMIN — SODIUM CHLORIDE 100 MILLILITER(S): 9 INJECTION INTRAMUSCULAR; INTRAVENOUS; SUBCUTANEOUS at 00:50

## 2017-09-11 RX ADMIN — Medication 110 MILLIGRAM(S): at 12:09

## 2017-09-11 RX ADMIN — SODIUM CHLORIDE 100 MILLILITER(S): 9 INJECTION INTRAMUSCULAR; INTRAVENOUS; SUBCUTANEOUS at 12:09

## 2017-09-11 RX ADMIN — AMLODIPINE BESYLATE 5 MILLIGRAM(S): 2.5 TABLET ORAL at 06:28

## 2017-09-11 RX ADMIN — SIMVASTATIN 40 MILLIGRAM(S): 20 TABLET, FILM COATED ORAL at 21:36

## 2017-09-11 NOTE — PROGRESS NOTE ADULT - PROBLEM SELECTOR PLAN 2
Patient follows  with Dr. Urias   Was diagnosed last week on colonoscopy and CT scan  -awaiting final biopsy results  -PET-CT was performed showed avid masses in colon, liver, pancreas and lungs  -GI to stent Monday  -Pancreatic lesion: poss. EUS with FNA biopsy as concern for primary Patient follows  with Dr. Urias   Was diagnosed last week on colonoscopy and CT scan  -awaiting final biopsy results  -PET-CT was performed showed avid masses in colon, liver, pancreas and lungs  -GI to stent Monday  -Pancreatic lesion: recommend EUS with FNA biopsy as concern for primary

## 2017-09-11 NOTE — PROGRESS NOTE ADULT - PROBLEM SELECTOR PLAN 7
-microcytic, Hgb 9.3, was 9.6 last month  -likely bleed from colonic mass  -maintain active type and screen, transfuse for goal >7  -Patient appears Iron down, will give Ferrlecit during this admission pending H/O rec's otherwise. -microcytic, Hgb 9.3, was 9.6 last month  -likely bleed from colonic mass  -maintain active type and screen, transfuse for goal >7  -Continue Ferrlecit for a total of 5 days.

## 2017-09-11 NOTE — PROGRESS NOTE ADULT - PROBLEM SELECTOR PLAN 6
-possibly with second malignancy given atrophy in pancreas and mass  -unclear if metastases are from colon or pancreas  -will send CEA and CA 19-9  onc recs -possibly with second malignancy given atrophy in pancreas and mass  -unclear if metastases are from colon or pancreas  -will send CEA and CA 19-9  -EUS FNA biopsy would be required to r/o second primary vs. mets

## 2017-09-11 NOTE — PROGRESS NOTE ADULT - ASSESSMENT
57 year old F w/ PMH of recently diagnosed metastatic colon cancer, HTN, HLD, presenting to [facility] ED with abdominal discomfort and constipation x1 week.

## 2017-09-11 NOTE — PROGRESS NOTE ADULT - PROBLEM SELECTOR PLAN 2
Severely iron deficient with low iron %, total and ferritin, Hg 9, MCV 68.6, likely related to blood loss. No clinical signs of hemolysis, normal bilirubin.     -IV iron supplementation daily  - pending B12, folate. check daily retic count

## 2017-09-11 NOTE — PROGRESS NOTE ADULT - PROBLEM SELECTOR PLAN 1
likely secondary to colonic mass and constipation due to partial obstruction by mass  -no signs of acute abdomen  -Patient put on Miralax 17g BID  -Clear Liquid Diet this weekend, NPO today for stent procedure this afternoon  -Golytely finished Sunday 2L 7p, Monday 2L 6a likely secondary to colonic mass    -no signs of acute abdomen  -Patient put on Miralax 17g BID    NPO today for stent procedure this afternoon  -Golytely finished Sunday 2L 7p, Monday 2L 6a

## 2017-09-11 NOTE — PROGRESS NOTE ADULT - PROBLEM SELECTOR PLAN 5
-w/ pitting edema on exam and hypercoagulability given active cancer. Doppler negative for DVT  -lovenox 40 mg daily -w/ pitting edema on exam and hypercoagulability given active cancer. Doppler negative for DVT  -lovenox 40 mg daily; being held for Stenting procedure Doppler negative for DVT

## 2017-09-11 NOTE — PROGRESS NOTE ADULT - SUBJECTIVE AND OBJECTIVE BOX
O/N Events:  Subjective/ROS: Denies HA, CP, SOB, n/v, changes in bowel/urinary habits.  12pt ROS otherwise negative.    VITALS  Vital Signs Last 24 Hrs  T(C): 36.7 (11 Sep 2017 06:25), Max: 36.8 (10 Sep 2017 21:15)  T(F): 98 (11 Sep 2017 06:25), Max: 98.2 (10 Sep 2017 21:15)  HR: 105 (11 Sep 2017 06:25) (71 - 105)  BP: 155/99 (11 Sep 2017 06:25) (132/84 - 155/99)  BP(mean): --  RR: 16 (11 Sep 2017 06:25) (16 - 16)  SpO2: 100% (11 Sep 2017 06:25) (98% - 100%)    CAPILLARY BLOOD GLUCOSE          PHYSICAL EXAM  General: A&Ox3; NAD  Head: NC/AT; MMM; PERRL; EOMI;  Neck: Supple; no JVD  Respiratory: CTA B/L; no wheezes/crackles   Cardiovascular: Regular rhythm/rate; S1/S2   Gastrointestinal: Soft; NTND; normoactive BS  Extremities: WWP; no edema/cyanosis  Neurological:  CNII-XII grossly intact; no obvious focal deficits    MEDICATIONS  (STANDING):  simvastatin 40 milliGRAM(s) Oral at bedtime  amLODIPine   Tablet 5 milliGRAM(s) Oral daily  sodium chloride 0.9%. 1000 milliLiter(s) (100 mL/Hr) IV Continuous <Continuous>  polyethylene glycol 3350 17 Gram(s) Oral two times a day  sodium ferric gluconate complex IVPB 125 milliGRAM(s) IV Intermittent daily    MEDICATIONS  (PRN):  acetaminophen   Tablet. 650 milliGRAM(s) Oral every 6 hours PRN Moderate Pain (4 - 6)      No Known Allergies      LABS                        8.6    8.8   )-----------( 392      ( 10 Sep 2017 06:45 )             27.7     09-10    143  |  106  |  6<L>  ----------------------------<  96  4.2   |  24  |  1.20    Ca    9.4      10 Sep 2017 06:45                IMAGING/EKG/ETC  EKG:  Xray:  CT:  MRI: 57 year old F w/ PMH of recently diagnosed metastatic colon cancer, HTN, HLD, presenting to [facility] ED with abdominal discomfort and constipation x1 week.    O/N Events: DANIEL  Subjective/ROS: Patient has no complaints today. States she finished her 2L of Golytely this morning. Denies HA, CP, SOB, n/v, changes in bowel/urinary habits.  12pt ROS otherwise negative.    VITALS  Vital Signs Last 24 Hrs  T(C): 36.7 (11 Sep 2017 06:25), Max: 36.8 (10 Sep 2017 21:15)  T(F): 98 (11 Sep 2017 06:25), Max: 98.2 (10 Sep 2017 21:15)  HR: 105 (11 Sep 2017 06:25) (71 - 105)  BP: 155/99 (11 Sep 2017 06:25) (132/84 - 155/99)  BP(mean): --  RR: 16 (11 Sep 2017 06:25) (16 - 16)  SpO2: 100% (11 Sep 2017 06:25) (98% - 100%)    CAPILLARY BLOOD GLUCOSE    PHYSICAL EXAM  General: A&Ox3; NAD  Head: NC/AT; MMM; PERRL; EOMI;  Neck: Supple; no JVD  Respiratory: CTA B/L; no wheezes/crackles   Cardiovascular: Regular rhythm/rate; S1/S2, ro rubs, murmurs, or gallops  Back: Presence of Left Nephrostomy tube, dressing is dry. Draining clear fluid  Gastrointestinal: Soft; NTND; normoactive BS  Extremities: WWP; no edema/cyanosis  Neurological:  CNII-XII grossly intact; no obvious focal deficits    MEDICATIONS  (STANDING):  simvastatin 40 milliGRAM(s) Oral at bedtime  amLODIPine   Tablet 5 milliGRAM(s) Oral daily  sodium chloride 0.9%. 1000 milliLiter(s) (100 mL/Hr) IV Continuous <Continuous>  polyethylene glycol 3350 17 Gram(s) Oral two times a day  sodium ferric gluconate complex IVPB 125 milliGRAM(s) IV Intermittent daily    MEDICATIONS  (PRN):  acetaminophen   Tablet. 650 milliGRAM(s) Oral every 6 hours PRN Moderate Pain (4 - 6)      No Known Allergies      LABS                        8.6    8.8   )-----------( 392      ( 10 Sep 2017 06:45 )             27.7     09-10    143  |  106  |  6<L>  ----------------------------<  96  4.2   |  24  |  1.20    Ca    9.4      10 Sep 2017 06:45 57 year old F w/ PMH of recently diagnosed metastatic colon cancer, HTN, HLD, presenting to [facility] ED with abdominal discomfort and constipation x1 week.    O/N Events: DANIEL  Subjective/ROS: Patient has no complaints today. States she finished her 2L of Golytely this morning. stools appear to be chx broth consistency.   no abd pain      ROS  Denies HA, CP, SOB, n/v, changes in bowel/urinary habits.  12pt ROS otherwise negative.    VITALS  Vital Signs Last 24 Hrs  T(C): 36.7 (11 Sep 2017 06:25), Max: 36.8 (10 Sep 2017 21:15)  T(F): 98 (11 Sep 2017 06:25), Max: 98.2 (10 Sep 2017 21:15)  HR: 105 (11 Sep 2017 06:25) (71 - 105)  BP: 155/99 (11 Sep 2017 06:25) (132/84 - 155/99)  BP(mean): --  RR: 16 (11 Sep 2017 06:25) (16 - 16)  SpO2: 100% (11 Sep 2017 06:25) (98% - 100%)    CAPILLARY BLOOD GLUCOSE    PHYSICAL EXAM  General: A&Ox3; NAD  Head: NC/AT; MMM; PERRL; EOMI;  Neck: Supple; no JVD  Respiratory: CTA B/L; no wheezes/crackles   Cardiovascular: Regular rhythm/rate; S1/S2, ro rubs, murmurs, or gallops  Back: Presence of Left Nephrostomy tube, dressing is dry. Draining clear fluid  Gastrointestinal: Soft; NTND; normoactive BS  Extremities: WWP; no edema/cyanosis  Neurological:  CNII-XII grossly intact; 5/5 str all 4 ext, sensation intact bl,  no obvious focal deficits    MEDICATIONS  (STANDING):  simvastatin 40 milliGRAM(s) Oral at bedtime  amLODIPine   Tablet 5 milliGRAM(s) Oral daily  sodium chloride 0.9%. 1000 milliLiter(s) (100 mL/Hr) IV Continuous <Continuous>  polyethylene glycol 3350 17 Gram(s) Oral two times a day  sodium ferric gluconate complex IVPB 125 milliGRAM(s) IV Intermittent daily    MEDICATIONS  (PRN):  acetaminophen   Tablet. 650 milliGRAM(s) Oral every 6 hours PRN Moderate Pain (4 - 6)      No Known Allergies      LABS                        8.6    8.8   )-----------( 392      ( 10 Sep 2017 06:45 )             27.7     09-10    143  |  106  |  6<L>  ----------------------------<  96  4.2   |  24  |  1.20    Ca    9.4      10 Sep 2017 06:45

## 2017-09-11 NOTE — PROGRESS NOTE ADULT - SUBJECTIVE AND OBJECTIVE BOX
Heme/Onc Progress Note (Dr. Urias)    Interval History: Patient stable upon exam. Noted to have some nausea with bowel prep overnight. Patient otherwise denied acute new symptoms. Patient denied vomiting, fevers, chills, abdominal pain, bleeding, dizziness, fatigue, chest pain or shortness of breath.     ROS is otherwise negative.      Allergies    No Known Allergies    Intolerances        Medications:  MEDICATIONS  (STANDING):  simvastatin 40 milliGRAM(s) Oral at bedtime  amLODIPine   Tablet 5 milliGRAM(s) Oral daily  sodium chloride 0.9%. 1000 milliLiter(s) (100 mL/Hr) IV Continuous <Continuous>  polyethylene glycol 3350 17 Gram(s) Oral two times a day  sodium ferric gluconate complex IVPB 125 milliGRAM(s) IV Intermittent daily    MEDICATIONS  (PRN):  acetaminophen   Tablet. 650 milliGRAM(s) Oral every 6 hours PRN Moderate Pain (4 - 6)            PHYSICAL EXAM:    T(F): 98 (09-11-17 @ 06:25), Max: 98.2 (09-10-17 @ 21:15)  HR: 105 (09-11-17 @ 06:25) (71 - 105)  BP: 155/99 (09-11-17 @ 06:25) (137/81 - 155/99)  RR: 16 (09-11-17 @ 06:25) (16 - 16)  SpO2: 100% (09-11-17 @ 06:25) (98% - 100%)  Wt(kg): --    General: A&Ox3; NAD, morbidly obese  Head: NC/AT; MMM; PERRL; EOMI;  Neck: Supple; no JVD  Respiratory: CTA B/L; no wheezes/crackles   Cardiovascular: Regular rhythm/rate; S1/S2, ro rubs, murmurs, or gallops  Back: Presence of Left Nephrostomy tube, dressing is dry. Draining clear fluid  Gastrointestinal: Soft; NTND; normoactive BS  Extremities: WWP; no edema/cyanosis  Neurological:  CNII-XII grossly intact; no obvious focal deficits      Labs:                          9.0    11.1  )-----------( 435      ( 11 Sep 2017 07:13 )             29.3     CBC Full  -  ( 11 Sep 2017 07:13 )  WBC Count : 11.1 K/uL  Hemoglobin : 9.0 g/dL  Hematocrit : 29.3 %  Platelet Count - Automated : 435 K/uL  Mean Cell Volume : 68.6 fL  Mean Cell Hemoglobin : 21.1 pg  Mean Cell Hemoglobin Concentration : 30.7 g/dL  Auto Neutrophil # : x  Auto Lymphocyte # : x  Auto Monocyte # : x  Auto Eosinophil # : x  Auto Basophil # : x  Auto Neutrophil % : x  Auto Lymphocyte % : x  Auto Monocyte % : x  Auto Eosinophil % : x  Auto Basophil % : x        09-10    143  |  106  |  6<L>  ----------------------------<  96  4.2   |  24  |  1.20    Ca    9.4      10 Sep 2017 06:45

## 2017-09-11 NOTE — PROGRESS NOTE ADULT - PROBLEM SELECTOR PLAN 1
Suspicion for metastatic colon cancer given liver and lung masses biopsy consistent with adenocarcinoma, 6.4cm on CT imaging with compression of left ureter causing hydronephrosis and colonic obstruction. Of note pancreatic mass 2.4cm is suspicious could be second primary vs. metastasis as found to be PET avid. Urology, GI, and surgery on board. Overall patient has good performance status and will likely be a good candidate for chemotherapy. However, must delineate whether this is unresectable pancreatic ca versus Stage IV colon adenoca as prognosis will be dramatically different with/without chemotherapy    -f/u PET/CT scan for staging  -CEA elevated  -possible EUS with FNA biopsy for pancreatic lesion  -consider liver biopsy to determine whether pancreatic met vs colon  -colonic stent planned for today 9/11  - f/u urology recs for nephrostomy tube  - case discussed with Dr Urias.

## 2017-09-11 NOTE — PROGRESS NOTE ADULT - PROBLEM SELECTOR PLAN 4
Patient with Cr 1.4 on admission   -likely due to dehydration and poor PO intake as well as component of hydronephrosis . Cr downtrending to 1.2 today   -s/p 1L NS in ED, c/w NS @ 70 cc/hr Patient with Cr 1.4 on admission. Cr downtrending to 1.2 today   -likely due to dehydration and poor PO intake as well as component of hydronephrosis.   -s/p 1L NS in ED, c/w NS @ 70 cc/hr

## 2017-09-11 NOTE — PROGRESS NOTE ADULT - PROBLEM SELECTOR PLAN 10
-c/w simvastatin 40 mg daily    Diet: Clear Liquid  DVT prophylaxis: hold lovenox pending procedure  FULL CODE  Continue on RMF -c/w simvastatin 40 mg daily    Diet: NPO  DVT prophylaxis: hold lovenox pending procedure  FULL CODE  Continue on RMF

## 2017-09-12 LAB — CULTURE RESULTS: NO GROWTH — SIGNIFICANT CHANGE UP

## 2017-09-12 PROCEDURE — 99233 SBSQ HOSP IP/OBS HIGH 50: CPT

## 2017-09-12 RX ORDER — SOD SULF/SODIUM/NAHCO3/KCL/PEG
2000 SOLUTION, RECONSTITUTED, ORAL ORAL ONCE
Qty: 0 | Refills: 0 | Status: COMPLETED | OUTPATIENT
Start: 2017-09-12 | End: 2017-09-12

## 2017-09-12 RX ADMIN — Medication 650 MILLIGRAM(S): at 06:47

## 2017-09-12 RX ADMIN — Medication 650 MILLIGRAM(S): at 07:14

## 2017-09-12 RX ADMIN — POLYETHYLENE GLYCOL 3350 17 GRAM(S): 17 POWDER, FOR SOLUTION ORAL at 05:39

## 2017-09-12 RX ADMIN — Medication 2000 MILLILITER(S): at 19:13

## 2017-09-12 RX ADMIN — SODIUM CHLORIDE 100 MILLILITER(S): 9 INJECTION INTRAMUSCULAR; INTRAVENOUS; SUBCUTANEOUS at 03:53

## 2017-09-12 RX ADMIN — AMLODIPINE BESYLATE 5 MILLIGRAM(S): 2.5 TABLET ORAL at 05:39

## 2017-09-12 RX ADMIN — Medication 110 MILLIGRAM(S): at 14:19

## 2017-09-12 NOTE — PROGRESS NOTE ADULT - PROBLEM SELECTOR PLAN 2
Severely iron deficient with low iron %, total and ferritin, Hg 9, MCV 68.6, likely related to blood loss. No clinical signs of hemolysis, normal bilirubin.     -IV iron supplementation daily (4 total doses)  - pending B12, folate. check daily retic count

## 2017-09-12 NOTE — DIETITIAN INITIAL EVALUATION ADULT. - PROBLEM SELECTOR PLAN 4
-likely due to dehydration and poor PO intake as well as component of hydronephrosis although  -s/p 1L NS in ED, c/w NS @ 70 cc/hr

## 2017-09-12 NOTE — PROGRESS NOTE ADULT - SUBJECTIVE AND OBJECTIVE BOX
57 year old F w/ PMH of recently diagnosed metastatic colon cancer, HTN, HLD, presenting to [facility] ED with abdominal discomfort and constipation x1 week. 57 year old F w/ PMH of recently diagnosed metastatic colon cancer, HTN, HLD, presenting to [facility] ED with abdominal discomfort and constipation x1 week.    O/N Events: DANIEL, patient s/p 1st attempt at stent placement  Subjective/ROS: Patient states she has some mild abdominal discomfort. Patient expressed displeasure in having to perform Golytely Prep but understood the need. Discussed at length the treatment plan for her and plans to follow up with Dr. Urias. Denies HA, CP, SOB, n/v, changes in bowel/urinary habits. Denies SI  12pt ROS otherwise negative.    VITALS  Vital Signs Last 24 Hrs  T(C): 36.8 (12 Sep 2017 11:23), Max: 37.3 (12 Sep 2017 05:16)  T(F): 98.2 (12 Sep 2017 11:23), Max: 99.2 (12 Sep 2017 05:16)  HR: 82 (12 Sep 2017 11:23) (82 - 88)  BP: 121/77 (12 Sep 2017 11:23) (114/68 - 140/74)  BP(mean): --  RR: 18 (12 Sep 2017 11:23) (16 - 18)  SpO2: 98% (12 Sep 2017 11:23) (97% - 99%)    CAPILLARY BLOOD GLUCOSE    PHYSICAL EXAM  General: A&Ox3; NAD  Head: NC/AT; MMM; PERRL; EOMI;  Neck: Supple; no JVD  Respiratory: CTA B/L; no wheezes/crackles   Cardiovascular: Regular rhythm/rate; S1/S2   Gastrointestinal: Soft; ND, mild tenderness in all lower quadrants worse in LLQ; normoactive BS  Back: Presence of Left Nephrostomy tube, dressing is dry. Draining clear fluid, less pink tinged than yesterday  Extremities: WWP; no edema/cyanosis  Neurological:  CNII-XII grossly intact; no obvious focal deficits  Psychological: Patient in good mood    MEDICATIONS  (STANDING):  simvastatin 40 milliGRAM(s) Oral at bedtime  amLODIPine   Tablet 5 milliGRAM(s) Oral daily  sodium chloride 0.9%. 1000 milliLiter(s) (100 mL/Hr) IV Continuous <Continuous>  polyethylene glycol 3350 17 Gram(s) Oral two times a day  sodium ferric gluconate complex IVPB 125 milliGRAM(s) IV Intermittent daily  polyethylene glycol/electrolyte Solution. 2000 milliLiter(s) Oral once    MEDICATIONS  (PRN):  acetaminophen   Tablet. 650 milliGRAM(s) Oral every 6 hours PRN Moderate Pain (4 - 6)      No Known Allergies      LABS                        9.0    11.1  )-----------( 435      ( 11 Sep 2017 07:13 )             29.3 57 year old F w/ PMH of recently diagnosed metastatic colon cancer, HTN, HLD, presenting to [facility] ED with abdominal discomfort and constipation x1 week.    O/N Events: DANIEL, patient s/p 1st attempt at stent placement  Subjective/ROS: Patient states she has some mild abdominal discomfort. Patient expressed displeasure in having to perform Golytely Prep but understood the need. Discussed at length the treatment plan for her and plans to follow up with Dr. Urias. Denies HA, CP, SOB, n/v, changes in bowel/urinary habits. Denies SI  12pt ROS otherwise negative.    VITALS  Vital Signs Last 24 Hrs  T(C): 36.8 (12 Sep 2017 11:23), Max: 37.3 (12 Sep 2017 05:16)  T(F): 98.2 (12 Sep 2017 11:23), Max: 99.2 (12 Sep 2017 05:16)  HR: 82 (12 Sep 2017 11:23) (82 - 88)  BP: 121/77 (12 Sep 2017 11:23) (114/68 - 140/74)  BP(mean): --  RR: 18 (12 Sep 2017 11:23) (16 - 18)  SpO2: 98% (12 Sep 2017 11:23) (97% - 99%)    CAPILLARY BLOOD GLUCOSE    PHYSICAL EXAM  General: A&Ox3; NAD  Head: NC/AT; MMM; PERRL; EOMI;  Neck: Supple; no JVD  Respiratory: CTA B/L; no wheezes/crackles   Cardiovascular: Regular rhythm/rate; S1/S2   Gastrointestinal: Soft; ND, mild tenderness in all lower quadrants worse in LLQ; normoactive BS  Back: Presence of Left Nephrostomy tube, dressing is dry. Draining clear fluid, less pink tinged than yesterday  Extremities: WWP; no edema/cyanosis  Neurological:  CNII-XII grossly intact; 5/5 str all 4 ext, no obvious focal deficits  Psychological: Patient in good mood    MEDICATIONS  (STANDING):  simvastatin 40 milliGRAM(s) Oral at bedtime  amLODIPine   Tablet 5 milliGRAM(s) Oral daily  sodium chloride 0.9%. 1000 milliLiter(s) (100 mL/Hr) IV Continuous <Continuous>  polyethylene glycol 3350 17 Gram(s) Oral two times a day  sodium ferric gluconate complex IVPB 125 milliGRAM(s) IV Intermittent daily  polyethylene glycol/electrolyte Solution. 2000 milliLiter(s) Oral once    MEDICATIONS  (PRN):  acetaminophen   Tablet. 650 milliGRAM(s) Oral every 6 hours PRN Moderate Pain (4 - 6)      No Known Allergies      LABS                        9.0    11.1  )-----------( 435      ( 11 Sep 2017 07:13 )             29.3

## 2017-09-12 NOTE — DIETITIAN INITIAL EVALUATION ADULT. - PROBLEM SELECTOR PLAN 10
-c/w simvastatin 40 mg daily    Diet: NPO for now    DVT prophylaxis: hold lovenox     FULL CODE    Admit to F

## 2017-09-12 NOTE — PROGRESS NOTE ADULT - ATTENDING COMMENTS
Feels better now, however still without substantial bowel movements.  Attempt at colon stent unsuccessful.  Will try again tomorrow.  Plan to attempt EUS guided FNA of pancreatic mass as well.  Patient will most likely benefit from liver biopsy also to confirm metastatic disease.  Plan to discuss treatment options once pathology available.  Patient is aware that she will most likely need to go on systemic chemotherapy.
57 year old F w/ PMH of recently diagnosed metastatic colon cancer, HTN, HLD, presenting to [facility] ED with abdominal discomfort and constipation x1 week    Patient seen and examined, I agree with the above assessment and plan.   She is feeling well without abdominal pain, but has not had BM in over a week.   Discussed with GI, will likely benefit from 2 day prep leadingto planned colonoscopy with stent on Monday; cont clear liquids NPO sunday at midnight  Per coloretal surgery, no role for diverting colostomy at this time as no evidence of complete obstrution  S/p nephrostomy that looks well appearing and is nontender; Cr is decreasing gradually

## 2017-09-12 NOTE — PROGRESS NOTE ADULT - SUBJECTIVE AND OBJECTIVE BOX
Pt seen and examined at bedside   No new c/o  Has some abdominal fullness, otherwise denies any new pain, n/v/d, bleeding      MEDICATIONS:  MEDICATIONS  (STANDING):  simvastatin 40 milliGRAM(s) Oral at bedtime  amLODIPine   Tablet 5 milliGRAM(s) Oral daily  sodium chloride 0.9%. 1000 milliLiter(s) (100 mL/Hr) IV Continuous <Continuous>  polyethylene glycol 3350 17 Gram(s) Oral two times a day  sodium ferric gluconate complex IVPB 125 milliGRAM(s) IV Intermittent daily  polyethylene glycol/electrolyte Solution. 2000 milliLiter(s) Oral once    MEDICATIONS  (PRN):  acetaminophen   Tablet. 650 milliGRAM(s) Oral every 6 hours PRN Moderate Pain (4 - 6)      Allergies  No Known Allergies    Intolerances      Vital Signs Last 24 Hrs  T(C): 36.8 (12 Sep 2017 11:23), Max: 37.3 (12 Sep 2017 05:16)  T(F): 98.2 (12 Sep 2017 11:23), Max: 99.2 (12 Sep 2017 05:16)  HR: 82 (12 Sep 2017 11:23) (82 - 88)  BP: 121/77 (12 Sep 2017 11:23) (114/68 - 140/74)  BP(mean): --  RR: 18 (12 Sep 2017 11:23) (16 - 18)  SpO2: 98% (12 Sep 2017 11:23) (97% - 99%)    09-11 @ 07:01 - 09-12 @ 07:00  --------------------------------------------------------  IN: 2400 mL / OUT: 1250 mL / NET: 1150 mL    09-12 @ 07:01 - 09-12 @ 18:31  --------------------------------------------------------  IN: 700 mL / OUT: 0 mL / NET: 700 mL        PHYSICAL EXAM:  General: Well developed; well nourished; in no acute distress  HEENT: MMM, conjunctiva and sclera clear  Gastrointestinal: Soft non-tender non-distended; Normal bowel sounds; No hepatosplenomegaly  Skin: Warm and dry. No obvious rash    LABS:  CBC Full  -  ( 11 Sep 2017 07:13 )  WBC Count : 11.1 K/uL  Hemoglobin : 9.0 g/dL  Hematocrit : 29.3 %  Platelet Count - Automated : 435 K/uL  Mean Cell Volume : 68.6 fL  Mean Cell Hemoglobin : 21.1 pg  Mean Cell Hemoglobin Concentration : 30.7 g/dL      RADIOLOGY & ADDITIONAL STUDIES (The following images were personally reviewed):  NM PET/CT Onc FDG Skull to Thigh, Inital (09.08.17 @ 16:14)   Impression:   1. Hypermetabolic pancreatic bodymass typical of pancreatic ductal   adenocarcinoma. This would be an unusual appearance of metastatic disease   from colon cancer.    2. Hypermetabolic mass sigmoid colon with extra colonic extension to   involve left ovary. This also represent a primary colon cancer and/or   metastatic disease.    3. Multiple bilateral lung nodules and masses, consistent with pulmonary   metastatic disease.    4. Left pleural mass, consistent with metastatic disease.    5. Liver mass consistent with metastatic disease.    6. Hypermetabolic retroperitoneal adenopathy.    7. Hypermetabolic omental nodules.    8. Increased activity T9 vertebral body suspicious for metastatic disease.    9. There is again moderate left hydroureteronephrosis with left ureter   obstructed by the left lower quadrant/sigmoid mass.

## 2017-09-12 NOTE — PROGRESS NOTE ADULT - PROBLEM SELECTOR PLAN 2
Patient follows  with Dr. Urias. Was diagnosed last week on colonoscopy and CT scan  -awaiting final biopsy results  -PET-CT was performed showed avid masses in colon, liver, pancreas and lungs  -GI will biopsy Pancreatic lesion during stent placement tomorrow. Will touch base about whether they will be able to biopsy liver lesion in addition.

## 2017-09-12 NOTE — PROGRESS NOTE ADULT - PROBLEM SELECTOR PLAN 6
-microcytic, Hgb 9.3, was 9.6 last month  -likely bleed from colonic mass  -maintain active type and screen, transfuse for goal >7  -Continue Ferrlecit for a total of 5 days; last dose tomorrow.

## 2017-09-12 NOTE — DIETITIAN INITIAL EVALUATION ADULT. - PROBLEM SELECTOR PLAN 7
-microcytic, Hgb 9.3, was 9.6 last month  -likely bleed from colonic mass  -maintain active type and screen, transfuse for goal >7  -f/u iron panel in AM

## 2017-09-12 NOTE — DIETITIAN INITIAL EVALUATION ADULT. - NS AS NUTRI INTERV ED CONTENT
Purpose of the nutrition education/Recommended modifications/pt advised to meet increased needs by consuming nutritionally dense food (options are given) and consuming nutritional supplements when needed

## 2017-09-12 NOTE — PROGRESS NOTE ADULT - PROBLEM SELECTOR PLAN 5
possibly with second malignancy given atrophy in pancreas and mass. PET Avid nodule.   -unclear if metastases are from colon or pancreas  - negative, CEA positive.   -EUS FNA biopsy to be performed tomorrow

## 2017-09-12 NOTE — DIETITIAN INITIAL EVALUATION ADULT. - ENERGY NEEDS
87.8kg (9/7/17), IBW:  47.7kg %IBW:  184% BMI:   36.6; IBW utilized for needs 2/2 pt >120% of IBW; Increased needs per catabolic illness/unintentional wt loss

## 2017-09-12 NOTE — PROGRESS NOTE ADULT - PROBLEM SELECTOR PLAN 1
likely secondary to colonic mass    -no signs of acute abdomen  -Failed stent placement yesterday; GI will attempt again tomorrow  -NPO at 7p with 2L Golytely Prep for procedure tomorrow.

## 2017-09-12 NOTE — DIETITIAN INITIAL EVALUATION ADULT. - OTHER INFO
Pt recently diagnosed w/metastatic colon cancer; per PET-CT masses in colon, liver, pancreas and lungs; presenting to [facility] ED with abdominal discomfort and constipation x1 week.  S/P L PCN maurilio stent placement  per left sided hydronephrosis; pt will  have stent placement per bowel obstruction (secondary to colonic mass) tomorrow, 1st attempt failed yesterday. On clears currently, pt reports unintentional ~40# wt loss d/t decreased appetite and diarrhea since the dx of CA  last March; pt educated on increased kcal/protein intake from consumption of nutrient dense foods and including nutritional supplement to meet needs when diet advanced; pt is receptive to education. Denies N/V; No regular BM d/t current conditions and prep agents; w/ NKFA; Skin- w/ drain per nephrostomy tune; w/ 1+ L leg edema.

## 2017-09-12 NOTE — DIETITIAN INITIAL EVALUATION ADULT. - PROBLEM SELECTOR PLAN 6
-possibly with second malignancy given atrophy in pancreas and mass  -unclear if metastases are from colon or pancreas  -will send CEA and CA 19-9

## 2017-09-12 NOTE — PROGRESS NOTE ADULT - PROBLEM SELECTOR PLAN 4
Patient with Cr 1.4 on admission. Cr downtrending to 1.2 today   -likely due to dehydration and poor PO intake as well as component of hydronephrosis.   -c/w NS @ 70 cc/hr

## 2017-09-12 NOTE — PROGRESS NOTE ADULT - ASSESSMENT
57 year old F w/ PMH of recently diagnosed metastatic colon cancer, HTN, HLD, presenting to [facility] ED with abdominal discomfort and constipation x1 week.

## 2017-09-12 NOTE — PROGRESS NOTE ADULT - ASSESSMENT
58 YO F with recent diagnosis of metastatic colon cancer and pancreatic mass with h/o HTN, HLD, obesity was sent to the ED by her oncologist (Dr Urias) for evaluation of abdominal discomfort and constipation x1 week found to have a bulky proximal sigmoid tumor causing L hydrureter/hydronephrosis s/p IR percutaneous nephrostomy.    PLAN -   # Metastatic colon ca with near colonic obstruction  - Plan for colonic stent reattempt at placement tomorrow  - NPO midnite  - will try to coordinate EUS with FNA following stent placement  - F/u Heme/Onc input    # Pancreatic mass -   - Likely primary pancreatic adenoca  - will attempt EUS with FNA/biopsy tomorrow    Discussed with attending Dr Juan David GANNON following

## 2017-09-12 NOTE — PROGRESS NOTE ADULT - SUBJECTIVE AND OBJECTIVE BOX
Heme/Onc Progress Note (Dr. Urias)    Interval History: Patient seen and examined with Dr. Urias at the bedside. Patient admits to some low back and abdominal pain however denies acute nausea, vomiting, constipation or diarrhea. Patient denies acute chest pain, shortness of breath, or bleeding.     Allergies    No Known Allergies    Intolerances        Medications:  MEDICATIONS  (STANDING):  simvastatin 40 milliGRAM(s) Oral at bedtime  amLODIPine   Tablet 5 milliGRAM(s) Oral daily  sodium chloride 0.9%. 1000 milliLiter(s) (100 mL/Hr) IV Continuous <Continuous>  polyethylene glycol 3350 17 Gram(s) Oral two times a day  sodium ferric gluconate complex IVPB 125 milliGRAM(s) IV Intermittent daily    MEDICATIONS  (PRN):  acetaminophen   Tablet. 650 milliGRAM(s) Oral every 6 hours PRN Moderate Pain (4 - 6)            PHYSICAL EXAM:    T(F): 98.2 (09-12-17 @ 11:23), Max: 99.2 (09-12-17 @ 05:16)  HR: 82 (09-12-17 @ 11:23) (82 - 88)  BP: 121/77 (09-12-17 @ 11:23) (114/68 - 140/74)  RR: 18 (09-12-17 @ 11:23) (15 - 18)  SpO2: 98% (09-12-17 @ 11:23) (97% - 99%)  Wt(kg): --    Daily       General: A&Ox3; NAD, morbidly obese  Head: NC/AT; MMM; PERRL; EOMI;  Neck: Supple; no JVD  Respiratory: CTA B/L; no wheezes/crackles   Cardiovascular: Regular rhythm/rate; S1/S2, ro rubs, murmurs, or gallops  Back: Presence of Left Nephrostomy tube, dressing is dry. Draining clear fluid  Gastrointestinal: Soft; NTND; normoactive BS  Extremities: WWP; no edema/cyanosis  Neurological:  CNII-XII grossly intact; no obvious focal deficits      Labs:                          9.0    11.1  )-----------( 435      ( 11 Sep 2017 07:13 )             29.3     CBC Full  -  ( 11 Sep 2017 07:13 )  WBC Count : 11.1 K/uL  Hemoglobin : 9.0 g/dL  Hematocrit : 29.3 %  Platelet Count - Automated : 435 K/uL  Mean Cell Volume : 68.6 fL  Mean Cell Hemoglobin : 21.1 pg  Mean Cell Hemoglobin Concentration : 30.7 g/dL  Auto Neutrophil # : x  Auto Lymphocyte # : x  Auto Monocyte # : x  Auto Eosinophil # : x  Auto Basophil # : x  Auto Neutrophil % : x  Auto Lymphocyte % : x  Auto Monocyte % : x  Auto Eosinophil % : x  Auto Basophil % : x Heme/Onc Progress Note (Dr. Urias)    Interval History: Patient seen and examined with Dr. Urias at the bedside. Patient admits to some low back and abdominal pain however denies acute nausea, vomiting, constipation or diarrhea. Patient denies acute chest pain, shortness of breath, or bleeding.     Allergies    No Known Allergies    Intolerances        Medications:  MEDICATIONS  (STANDING):  simvastatin 40 milliGRAM(s) Oral at bedtime  amLODIPine   Tablet 5 milliGRAM(s) Oral daily  sodium chloride 0.9%. 1000 milliLiter(s) (100 mL/Hr) IV Continuous <Continuous>  polyethylene glycol 3350 17 Gram(s) Oral two times a day  sodium ferric gluconate complex IVPB 125 milliGRAM(s) IV Intermittent daily    MEDICATIONS  (PRN):  acetaminophen   Tablet. 650 milliGRAM(s) Oral every 6 hours PRN Moderate Pain (4 - 6)            PHYSICAL EXAM:    T(F): 98.2 (09-12-17 @ 11:23), Max: 99.2 (09-12-17 @ 05:16)  HR: 82 (09-12-17 @ 11:23) (82 - 88)  BP: 121/77 (09-12-17 @ 11:23) (114/68 - 140/74)  RR: 18 (09-12-17 @ 11:23) (15 - 18)  SpO2: 98% (09-12-17 @ 11:23) (97% - 99%)  Wt(kg): --    Daily       General: A&Ox3; NAD, morbidly obese  Head: NC/AT; MMM; PERRL; EOMI;  Neck: Supple; no JVD  Respiratory: CTA B/L; no wheezes/crackles   Cardiovascular: Regular rhythm/rate; S1/S2, ro rubs, murmurs, or gallops  Back: Presence of Left Nephrostomy tube, dressing is dry. Draining clear fluid  Gastrointestinal: Soft; NTND; normoactive BS  Extremities: lower extremity edema bilaterally  Neurological:  CNII-XII grossly intact; no obvious focal deficits      Labs:                          9.0    11.1  )-----------( 435      ( 11 Sep 2017 07:13 )             29.3     CBC Full  -  ( 11 Sep 2017 07:13 )  WBC Count : 11.1 K/uL  Hemoglobin : 9.0 g/dL  Hematocrit : 29.3 %  Platelet Count - Automated : 435 K/uL  Mean Cell Volume : 68.6 fL  Mean Cell Hemoglobin : 21.1 pg  Mean Cell Hemoglobin Concentration : 30.7 g/dL  Auto Neutrophil # : x  Auto Lymphocyte # : x  Auto Monocyte # : x  Auto Eosinophil # : x  Auto Basophil # : x  Auto Neutrophil % : x  Auto Lymphocyte % : x  Auto Monocyte % : x  Auto Eosinophil % : x  Auto Basophil % : x

## 2017-09-12 NOTE — PROGRESS NOTE ADULT - PROBLEM SELECTOR PLAN 1
Suspicion for metastatic colon cancer given liver and lung masses biopsy consistent with adenocarcinoma, 6.4cm on CT imaging with compression of left ureter causing hydronephrosis and colonic obstruction. Of note pancreatic mass 2.4cm is suspicious could be second primary vs. metastasis as found to be PET avid. Urology, GI, and surgery on board. Overall patient has good performance status and will likely be a good candidate for chemotherapy. However, must delineate whether this is unresectable pancreatic ca versus Stage IV colon adenoca as prognosis will be dramatically different with/without chemotherapy.    -f/u final read PET/CT scan  -CEA elevated  -plan for EUS with FNA biopsy for pancreatic lesion Wed 9/13  -repeat attempt at colonic stent planned for Wed 9/13  - f/u urology recs for nephrostomy tube  - case discussed with Dr Urias Suspicion for metastatic colon cancer given liver and lung masses biopsy consistent with adenocarcinoma, 6.4cm on CT imaging with compression of left ureter causing hydronephrosis and colonic obstruction. Of note pancreatic mass 2.4cm is suspicious could be second primary vs. metastasis as found to be PET avid. Urology, GI, and surgery on board. Overall patient has good performance status and will likely be a good candidate for chemotherapy. However, must delineate whether this is unresectable pancreatic ca versus Stage IV colon adenoca as prognosis will be dramatically different with/without chemotherapy.    -lower extremity doppler r/o dvt given LE edema   -f/u final read PET/CT scan  -CEA elevated  -plan for EUS with FNA biopsy for pancreatic lesion Wed 9/13  -repeat attempt at colonic stent planned for Wed 9/13  - f/u urology recs for nephrostomy tube  - case discussed with Dr Urias

## 2017-09-13 ENCOUNTER — RESULT REVIEW (OUTPATIENT)
Age: 57
End: 2017-09-13

## 2017-09-13 LAB
ANION GAP SERPL CALC-SCNC: 17 MMOL/L — SIGNIFICANT CHANGE UP (ref 5–17)
BUN SERPL-MCNC: 3 MG/DL — LOW (ref 7–23)
CALCIUM SERPL-MCNC: 9.2 MG/DL — SIGNIFICANT CHANGE UP (ref 8.4–10.5)
CHLORIDE SERPL-SCNC: 105 MMOL/L — SIGNIFICANT CHANGE UP (ref 96–108)
CO2 SERPL-SCNC: 24 MMOL/L — SIGNIFICANT CHANGE UP (ref 22–31)
CREAT SERPL-MCNC: 1 MG/DL — SIGNIFICANT CHANGE UP (ref 0.5–1.3)
GLUCOSE SERPL-MCNC: 100 MG/DL — HIGH (ref 70–99)
HCT VFR BLD CALC: 28.4 % — LOW (ref 34.5–45)
HGB BLD-MCNC: 8.8 G/DL — LOW (ref 11.5–15.5)
MAGNESIUM SERPL-MCNC: 1.6 MG/DL — SIGNIFICANT CHANGE UP (ref 1.6–2.6)
MCHC RBC-ENTMCNC: 21.3 PG — LOW (ref 27–34)
MCHC RBC-ENTMCNC: 31 G/DL — LOW (ref 32–36)
MCV RBC AUTO: 68.8 FL — LOW (ref 80–100)
PLATELET # BLD AUTO: 402 K/UL — HIGH (ref 150–400)
POTASSIUM SERPL-MCNC: 3.7 MMOL/L — SIGNIFICANT CHANGE UP (ref 3.5–5.3)
POTASSIUM SERPL-SCNC: 3.7 MMOL/L — SIGNIFICANT CHANGE UP (ref 3.5–5.3)
RBC # BLD: 4.13 M/UL — SIGNIFICANT CHANGE UP (ref 3.8–5.2)
RBC # FLD: 18.7 % — HIGH (ref 10.3–16.9)
SODIUM SERPL-SCNC: 146 MMOL/L — HIGH (ref 135–145)
WBC # BLD: 11 K/UL — HIGH (ref 3.8–10.5)
WBC # FLD AUTO: 11 K/UL — HIGH (ref 3.8–10.5)

## 2017-09-13 PROCEDURE — 45330 DIAGNOSTIC SIGMOIDOSCOPY: CPT

## 2017-09-13 PROCEDURE — 99233 SBSQ HOSP IP/OBS HIGH 50: CPT

## 2017-09-13 PROCEDURE — 47000 NEEDLE BIOPSY OF LIVER PERQ: CPT

## 2017-09-13 PROCEDURE — 76942 ECHO GUIDE FOR BIOPSY: CPT | Mod: 26

## 2017-09-13 RX ADMIN — Medication 110 MILLIGRAM(S): at 20:56

## 2017-09-13 RX ADMIN — Medication 650 MILLIGRAM(S): at 22:00

## 2017-09-13 RX ADMIN — Medication 650 MILLIGRAM(S): at 21:04

## 2017-09-13 RX ADMIN — SODIUM CHLORIDE 100 MILLILITER(S): 9 INJECTION INTRAMUSCULAR; INTRAVENOUS; SUBCUTANEOUS at 02:13

## 2017-09-13 RX ADMIN — AMLODIPINE BESYLATE 5 MILLIGRAM(S): 2.5 TABLET ORAL at 10:14

## 2017-09-13 RX ADMIN — SIMVASTATIN 40 MILLIGRAM(S): 20 TABLET, FILM COATED ORAL at 21:04

## 2017-09-13 NOTE — PROGRESS NOTE ADULT - PROBLEM SELECTOR PLAN 2
Patient follows  with Dr. Urias. Was diagnosed last week on colonoscopy and CT scan  -awaiting final biopsy results  -PET-CT was performed showed avid masses in colon, liver, pancreas and lungs  -GI will biopsy Pancreatic lesion during stent placement today.   -IR to do Liver guided biopsy hopefully today.

## 2017-09-13 NOTE — PROGRESS NOTE ADULT - SUBJECTIVE AND OBJECTIVE BOX
Heme/Onc Progress Note (Dr. Urias)     Interval History: Patient admits to some abdominal pain overnight and chronic back pain. Patient was able to tolerate her bowel prep. Otherwise no acute shortness of breath, chest pain, nausea, vomiting, fevers, chills, or bleeding.     ROS is otherwise negative.      Allergies    No Known Allergies    Intolerances        Medications:  MEDICATIONS  (STANDING):  simvastatin 40 milliGRAM(s) Oral at bedtime  amLODIPine   Tablet 5 milliGRAM(s) Oral daily  sodium chloride 0.9%. 1000 milliLiter(s) (100 mL/Hr) IV Continuous <Continuous>  polyethylene glycol 3350 17 Gram(s) Oral two times a day  sodium ferric gluconate complex IVPB 125 milliGRAM(s) IV Intermittent daily    MEDICATIONS  (PRN):  acetaminophen   Tablet. 650 milliGRAM(s) Oral every 6 hours PRN Moderate Pain (4 - 6)            PHYSICAL EXAM:    T(F): 98.4 (17 @ 05:30), Max: 98.8 (17 @ 20:44)  HR: 82 (17 @ 05:30) (82 - 89)  BP: 108/71 (17 @ 05:30) (108/71 - 146/81)  RR: 16 (17 @ 05:30) (16 - 20)  SpO2: 96% (17 @ 05:30) (96% - 98%)  Wt(kg): --    Daily Weight in k.7 (12 Sep 2017 13:58)    General: A&Ox3; NAD, morbidly obese  Head: NC/AT; MMM; PERRL; EOMI;  Neck: Supple; no JVD  Respiratory: CTA B/L; no wheezes/crackles   Cardiovascular: Regular rhythm/rate; S1/S2, ro rubs, murmurs, or gallops  Back: Presence of Left Nephrostomy tube, dressing is dry. Draining clear fluid  Gastrointestinal: Soft; NTND; normoactive BS  Extremities: lower extremity edema bilaterally  Neurological:  CNII-XII grossly intact; no obvious focal deficits  Labs:                          8.8    11.0  )-----------( 402      ( 13 Sep 2017 09:11 )             28.4     CBC Full  -  ( 13 Sep 2017 09:11 )  WBC Count : 11.0 K/uL  Hemoglobin : 8.8 g/dL  Hematocrit : 28.4 %  Platelet Count - Automated : 402 K/uL  Mean Cell Volume : 68.8 fL  Mean Cell Hemoglobin : 21.3 pg  Mean Cell Hemoglobin Concentration : 31.0 g/dL  Auto Neutrophil # : x  Auto Lymphocyte # : x  Auto Monocyte # : x  Auto Eosinophil # : x  Auto Basophil # : x  Auto Neutrophil % : x  Auto Lymphocyte % : x  Auto Monocyte % : x  Auto Eosinophil % : x  Auto Basophil % : x        PET/CT  17 - Impression:   1. Hypermetabolic pancreatic body mass typical of pancreatic ductal   adenocarcinoma. This would be an unusual appearance of metastatic disease   from colon cancer.    2. Hypermetabolic mass sigmoid colon with extra colonic extension to   involve left ovary. This also represent a primary colon cancer and/or   metastatic disease.    3. Multiple bilateral lung nodules and masses, consistent with pulmonary   metastatic disease.    4. Left pleural mass, consistent with metastatic disease.    5. Liver mass consistent with metastatic disease.    6. Hypermetabolic retroperitoneal adenopathy.    7. Hypermetabolic omental nodules.    8. Increased activity T9 vertebral body suspicious for metastatic disease.    9. There is again moderate left hydroureteronephrosis with left ureter   obstructed by the left lower quadrant/sigmoid mass.            Other Labs:    Cultures:    Pathology:    Imaging Studies:

## 2017-09-13 NOTE — CONSULT NOTE ADULT - SUBJECTIVE AND OBJECTIVE BOX
HPI:  57 year old F w/ PMH of recently diagnosed metastatic colon cancer/ pancreatic cancer ?, HTN, HLD, presented to [facility] ED on 17 with abdominal discomfort and constipation x1 week, sent in by oncologist Dr. Urias. Since 2017, patient has had a 30 lb weight loss with diarrhea, decreased appetite and abdominal discomfort. She initially thought it was IBS but had a colonoscopy last week (after 3 attempts, first 2 attempts with poor prep) and was found to have a mass in her colon and biopsies were taken. She was regularly getting her colonoscopies and had a normal one two years ago. She had a CT abd/pelvis done  which showed metastatic disease with metastases in the liver and lung. She was also found to have a mass in the pancreatic head concerning for a second malignancy. Since her colonoscopy last week she hasn't had a bowel movement. She was also found to have left hydroureteronephrosis on CT scan due to compression by the mass and partial SBO.     Surgery was consulted for colostomy creation    PAST MEDICAL & SURGICAL HISTORY:  High cholesterol  HTN (hypertension)  Colon cancer  H/O  section      ROS: See HPI    MEDICATIONS:  ALLERGIES:    SOCIAL HISTORY:  Smoke: Never Smoker  EtOH: occasional    Vital Signs Last 24 Hrs  T(C): 36.7 (13 Sep 2017 10:07), Max: 37.1 (12 Sep 2017 17:38)  T(F): 98.1 (13 Sep 2017 10:07), Max: 98.8 (12 Sep 2017 20:44)  HR: 80 (13 Sep 2017 10:07) (80 - 89)  BP: 129/70 (13 Sep 2017 10:07) (108/71 - 146/81)  BP(mean): --  RR: 15 (13 Sep 2017 10:07) (15 - 20)  SpO2: 99% (13 Sep 2017 10:07) (96% - 99%)    PHYSICAL EXAM  Gen: NAD  CV: RRR  Pulm: CTAB  Abd: Soft, NT/ND    LABS:                        8.8    11.0  )-----------( 402      ( 13 Sep 2017 09:11 )             28.4     09-13    146<H>  |  105  |  3<L>  ----------------------------<  100<H>  3.7   |  24  |  1.00    Ca    9.2      13 Sep 2017 09:11  Mg     1.6     -      RADIOLOGY & ADDITIONAL STUDIES:  < from: NM PET/CT Onc FDG Skull to Thigh, Inital (17 @ 16:14) >    EXAM:  PETCT ETHAN ONC FDG INIT                          PROCEDURE DATE:  2017                     INTERPRETATION:  PET/CT TUMOR IMAGING    Indication: Stage IV adenocarcinoma of the colon. Possibly secondary to   pancreatic primary malignancy.  Baseline study to help determine initial   treatment strategy.    Comparison is made to CT abdomen and pelvis from 2017.    Procedure: Intravenous access was established and the patient was   injected with 9.89 mCi of [id number]. After approximately 1   hour in a quiet room, the patient was positioned on the imaging table   with the arms as high above the head as possible.  PET/CT imaging was   then performed with the standard protocol from the base of the skull to   the mid thighs.  The CT scan is a low dose protocol with images used for   attenuation correction and localization only.    PET images were reconstructed in axial, sagittal and coronal planes using   CT based attenuation correction.  Images were displayed as PET, CT and   fused data sets as well as maximum intensity pixel projections.    The standard uptake values (SUV) reported below are maximum values within   the region of interest, expressed in gm/mL.    Findings: Images of the head and neck are unremarkable.    The heart is normal in size. No pericardial effusion. No thoracic   lymphadenopathy.    There are multiple randomly distributed, hypermetabolic lung nodules and   masses bilaterally, the largest in the left lower lobe measuring 3.4 cm,   some with cavitation, consistent with metastases. Highest SUV of lung   nodule is 11. There is a 2.3 x 1.2 cm hypermetabolic pleural mass   posteriorly at the left base, consistent with metastatic disease. No   pleural effusion.    4.2 x 4.0 cm hypodense lesion in segment 4A of the liver is FDG avid   (16.0 SUV). The gallbladder, spleen, adrenal glands and right kidney are   unremarkable. There is again moderate left hydroureteronephrosis, with   the left ureter dilated to mass arising from sigmoid colon. The 2.4 cm   mass in the pancreatic body is FDG avid (9.4 SUV). There is again   upstream pancreatic parenchymal atrophy and ductal dilatation.    No abdominal aortic aneurysm. Enlarged left para-aortic node measuring   1.0 cm which is FDG avid (6.7 SUV). Large left periumbilical hernia   containing 2 hypermetabolic nodules with the larger one measuring 1.3 cm   (12.7 SUV). Few foci of air noted in right periumbilical soft tissues,   likely due to recent injection.    There is a moderate-sized type III hiatal hernia. There is intense FDG   uptake (SUV 20) in the sigmoid colon mass. Again, the mass extends   inferiorly outside of the colon and involves the enlarged left ovary. No   increased activity within the thickened endometrium. No right adnexal   mass.    Evaluation of the osseous structures demonstrates FDG uptake in the T9   vertebral body (12.0 SUV). This difficult to visualize bone lesion on the   CT images at that site.    Impression:   1. Hypermetabolic pancreatic bodymass typical of pancreatic ductal   adenocarcinoma. This would be an unusual appearance of metastatic disease   from colon cancer.    2. Hypermetabolic mass sigmoid colon with extra colonic extension to   involve left ovary. This also represent a primary colon cancer and/or   metastatic disease.    3. Multiple bilateral lung nodules and masses, consistent with pulmonary   metastatic disease.    4. Left pleural mass, consistent with metastatic disease.    5. Liver mass consistent with metastatic disease.    6. Hypermetabolic retroperitoneal adenopathy.    7. Hypermetabolic omental nodules.    8. Increased activity T9 vertebral body suspicious for metastatic disease.    9. There is again moderate left hydroureteronephrosis with left ureter   obstructed by the left lower quadrant/sigmoid mass.    < from: CT Abdomen and Pelvis w/ Oral Cont and w/ IV Cont (17 @ 15:36) >    EXAM:  CT ABDOMEN AND PELVIS OC IC                          PROCEDURE DATE:  2017    Quantity of Contrast in Vial in ml: 100 Contrast Used: Optiray 350  Quantity of Contrast Wasted in ml: 7           INTERPRETATION:  CT of the ABDOMEN and PELVIS with intravenous contrast   dated 2017 3:36 PM    INDICATION: mass of colon     TECHNIQUE: CT of the abdomen and pelvis was performed with intravenous   and oral contrast. Axial and coronal and sagittal images were produced   and reviewed.    PRIOR STUDIES: None.    FINDINGS: Images of the lower chest demonstrate multiple bilateral lung   nodules largest measures 3.5 cm left lower lobe. Some of the nodules have   cavitation.        The liver is normal in size. 2.4 x 3.3 x 3.7 cm hypodense lesion in   segment 4A of liver highly suggestive of metastasis. No radiopaque stones   are seen in the gallbladder.    No splenic abnormalities are seen. The   pancreatic duct is dilated in the distal body and tail. Atrophy of the   distal body and tail of pancreas. Suspect a 2.4 x 2.4 x 2.4 cm mass in   the pancreatic body.    The adrenal glands are unremarkable. . Moderate left hydroureter stenosis   and left hydroureter. Dilated ureters seen down to the upper left   hemipelvis. Delayed excretion of contrast by left kidney. 0.6 cm   partially exophytic cortical hypodensity mid right kidney posterior   laterally, too small to characterize.    No abdominal aortic aneurysm is seen. Enlarged distal left paraaortic   node measuring 1.7 x 0.9 x 1.1 cm. Diastasis of rectus abdominis at the   umbilicus. Quite a large left paraumbilical hernia containing fat. This   hernia sac also appears to contain 2 nodules measuring 1.3 cm and 0.6 cm   suspicious for metastatic tumor peritoneal implants.    Evaluation of the bowel demonstrates moderate hiatal hernia. The ingested   oral contrast material has reached the distal descending colon. There is   a bulky apple core lesion involving the proximal sigmoid colon measuring   6.4 cm in length. There appears to be extracolonic spread of tumor on the   mesenteric aspect which is invading the pelvic ureter. Possible   involvement of the left ovary as well. Normal appendix.. No ascites is   seen.    Images of the pelvis demonstrate an anteverted uterus measuring 13.4 x   4.7 x 7.0 cm. Uterus is enlarged. Thickened endometrium measuring 1.0 cm.   Differential diagnosis for the latter includes endometrial polyp,   endometrial hyperplasia, endometrial carcinoma. The right ovary grossly   measures 2.3x 3.3 x 2.8 cm. The left ovary appears enlarged measuring   3.7 x 2.9 x 3.1 cm. Urinary bladder is grossly normal..       Evaluation of the osseous structures demonstrates slight anterolisthesis   of L4 on L5.      IMPRESSION: Multiple lung nodules highly suggestive of lung metastases.  2. Lesion in segment 4 of liver highly suggestive of metastasis.  3. Solid tumor in the pancreatic body with upstream dilatation of   pancreatic duct and atrophy suspect underlying pancreatic adenocarcinoma.  4. Bulky tumor mass involving the proximal sigmoid suggestive of primary   adenocarcinoma with extracolonic extension and obstruction of the left   pelvic ureter causing left hydronephrosis and hydroureter.      ASSESSMENT AND PLAN:  56 yo female, recently diagnosed with metastatic colon cancer and possibly secondary pancreatic cancer, now with partial SBO. Surgery consulted for evaluation for colostomy creation.     Plan for colostomy creation on Friday with Dr. Javier  - pre-op labs  in am (CBC, CMP, Mg, Phos, INR and PTT, type and screen)  - EKG  - CXR  - bowel prep  - rest of care per primary team  - surgery will follow, please call with any questions  - plan discussed with the attending HPI:  57 year old F w/ PMH of recently diagnosed metastatic colon cancer/ pancreatic cancer ?, HTN, HLD, presented to [facility] ED on 17 with abdominal discomfort and constipation x1 week, sent in by oncologist Dr. Urias. Since 2017, patient has had a 30 lb weight loss with diarrhea, decreased appetite and abdominal discomfort. She initially thought it was IBS but had a colonoscopy last week (after 3 attempts, first 2 attempts with poor prep) and was found to have a mass in her colon and biopsies were taken. She was regularly getting her colonoscopies and had a normal one two years ago. She had a CT abd/pelvis done  which showed metastatic disease with metastases in the liver and lung. She was also found to have a mass in the pancreatic head concerning for a second malignancy. Since her colonoscopy last week she hasn't had a bowel movement. She was also found to have left hydroureteronephrosis on CT scan due to compression by the mass and partial SBO. Patient had L nephrostomy tube placed by IR on , failed colonic stent placement.     Surgery was consulted for colostomy creation    PAST MEDICAL & SURGICAL HISTORY:  High cholesterol  HTN (hypertension)  Colon cancer  H/O  section      ROS: See HPI    MEDICATIONS:  ALLERGIES:    SOCIAL HISTORY:  Smoke: Never Smoker  EtOH: occasional    Vital Signs Last 24 Hrs  T(C): 36.7 (13 Sep 2017 10:07), Max: 37.1 (12 Sep 2017 17:38)  T(F): 98.1 (13 Sep 2017 10:07), Max: 98.8 (12 Sep 2017 20:44)  HR: 80 (13 Sep 2017 10:07) (80 - 89)  BP: 129/70 (13 Sep 2017 10:07) (108/71 - 146/81)  BP(mean): --  RR: 15 (13 Sep 2017 10:07) (15 - 20)  SpO2: 99% (13 Sep 2017 10:07) (96% - 99%)    PHYSICAL EXAM  Gen: NAD  CV: RRR  Pulm: CTAB  Abd: Soft, obese, mildly distended, mild diffuse discomfort to palpation, no rebound or guarding, L nephrostomy tube in place    LABS:                        8.8    11.0  )-----------( 402      ( 13 Sep 2017 09:11 )             28.4     09-    146<H>  |  105  |  3<L>  ----------------------------<  100<H>  3.7   |  24  |  1.00    Ca    9.2      13 Sep 2017 09:11  Mg     1.6           RADIOLOGY & ADDITIONAL STUDIES:  < from: NM PET/CT Onc FDG Skull to Thigh, Inital (17 @ 16:14) >    EXAM:  PETCT ETHAN ONC FDG INIT                          PROCEDURE DATE:  2017                     INTERPRETATION:  PET/CT TUMOR IMAGING    Indication: Stage IV adenocarcinoma of the colon. Possibly secondary to   pancreatic primary malignancy.  Baseline study to help determine initial   treatment strategy.    Comparison is made to CT abdomen and pelvis from 2017.    Procedure: Intravenous access was established and the patient was   injected with 9.89 mCi of [id number]. After approximately 1   hour in a quiet room, the patient was positioned on the imaging table   with the arms as high above the head as possible.  PET/CT imaging was   then performed with the standard protocol from the base of the skull to   the mid thighs.  The CT scan is a low dose protocol with images used for   attenuation correction and localization only.    PET images were reconstructed in axial, sagittal and coronal planes using   CT based attenuation correction.  Images were displayed as PET, CT and   fused data sets as well as maximum intensity pixel projections.    The standard uptake values (SUV) reported below are maximum values within   the region of interest, expressed in gm/mL.    Findings: Images of the head and neck are unremarkable.    The heart is normal in size. No pericardial effusion. No thoracic   lymphadenopathy.    There are multiple randomly distributed, hypermetabolic lung nodules and   masses bilaterally, the largest in the left lower lobe measuring 3.4 cm,   some with cavitation, consistent with metastases. Highest SUV of lung   nodule is 11. There is a 2.3 x 1.2 cm hypermetabolic pleural mass   posteriorly at the left base, consistent with metastatic disease. No   pleural effusion.    4.2 x 4.0 cm hypodense lesion in segment 4A of the liver is FDG avid   (16.0 SUV). The gallbladder, spleen, adrenal glands and right kidney are   unremarkable. There is again moderate left hydroureteronephrosis, with   the left ureter dilated to mass arising from sigmoid colon. The 2.4 cm   mass in the pancreatic body is FDG avid (9.4 SUV). There is again   upstream pancreatic parenchymal atrophy and ductal dilatation.    No abdominal aortic aneurysm. Enlarged left para-aortic node measuring   1.0 cm which is FDG avid (6.7 SUV). Large left periumbilical hernia   containing 2 hypermetabolic nodules with the larger one measuring 1.3 cm   (12.7 SUV). Few foci of air noted in right periumbilical soft tissues,   likely due to recent injection.    There is a moderate-sized type III hiatal hernia. There is intense FDG   uptake (SUV 20) in the sigmoid colon mass. Again, the mass extends   inferiorly outside of the colon and involves the enlarged left ovary. No   increased activity within the thickened endometrium. No right adnexal   mass.    Evaluation of the osseous structures demonstrates FDG uptake in the T9   vertebral body (12.0 SUV). This difficult to visualize bone lesion on the   CT images at that site.    Impression:   1. Hypermetabolic pancreatic bodymass typical of pancreatic ductal   adenocarcinoma. This would be an unusual appearance of metastatic disease   from colon cancer.    2. Hypermetabolic mass sigmoid colon with extra colonic extension to   involve left ovary. This also represent a primary colon cancer and/or   metastatic disease.    3. Multiple bilateral lung nodules and masses, consistent with pulmonary   metastatic disease.    4. Left pleural mass, consistent with metastatic disease.    5. Liver mass consistent with metastatic disease.    6. Hypermetabolic retroperitoneal adenopathy.    7. Hypermetabolic omental nodules.    8. Increased activity T9 vertebral body suspicious for metastatic disease.    9. There is again moderate left hydroureteronephrosis with left ureter   obstructed by the left lower quadrant/sigmoid mass.    < from: CT Abdomen and Pelvis w/ Oral Cont and w/ IV Cont (17 @ 15:36) >    EXAM:  CT ABDOMEN AND PELVIS OC IC                          PROCEDURE DATE:  2017    Quantity of Contrast in Vial in ml: 100 Contrast Used: Optiray 350  Quantity of Contrast Wasted in ml: 7           INTERPRETATION:  CT of the ABDOMEN and PELVIS with intravenous contrast   dated 2017 3:36 PM    INDICATION: mass of colon     TECHNIQUE: CT of the abdomen and pelvis was performed with intravenous   and oral contrast. Axial and coronal and sagittal images were produced   and reviewed.    PRIOR STUDIES: None.    FINDINGS: Images of the lower chest demonstrate multiple bilateral lung   nodules largest measures 3.5 cm left lower lobe. Some of the nodules have   cavitation.        The liver is normal in size. 2.4 x 3.3 x 3.7 cm hypodense lesion in   segment 4A of liver highly suggestive of metastasis. No radiopaque stones   are seen in the gallbladder.    No splenic abnormalities are seen. The   pancreatic duct is dilated in the distal body and tail. Atrophy of the   distal body and tail of pancreas. Suspect a 2.4 x 2.4 x 2.4 cm mass in   the pancreatic body.    The adrenal glands are unremarkable. . Moderate left hydroureter stenosis   and left hydroureter. Dilated ureters seen down to the upper left   hemipelvis. Delayed excretion of contrast by left kidney. 0.6 cm   partially exophytic cortical hypodensity mid right kidney posterior   laterally, too small to characterize.    No abdominal aortic aneurysm is seen. Enlarged distal left paraaortic   node measuring 1.7 x 0.9 x 1.1 cm. Diastasis of rectus abdominis at the   umbilicus. Quite a large left paraumbilical hernia containing fat. This   hernia sac also appears to contain 2 nodules measuring 1.3 cm and 0.6 cm   suspicious for metastatic tumor peritoneal implants.    Evaluation of the bowel demonstrates moderate hiatal hernia. The ingested   oral contrast material has reached the distal descending colon. There is   a bulky apple core lesion involving the proximal sigmoid colon measuring   6.4 cm in length. There appears to be extracolonic spread of tumor on the   mesenteric aspect which is invading the pelvic ureter. Possible   involvement of the left ovary as well. Normal appendix.. No ascites is   seen.    Images of the pelvis demonstrate an anteverted uterus measuring 13.4 x   4.7 x 7.0 cm. Uterus is enlarged. Thickened endometrium measuring 1.0 cm.   Differential diagnosis for the latter includes endometrial polyp,   endometrial hyperplasia, endometrial carcinoma. The right ovary grossly   measures 2.3x 3.3 x 2.8 cm. The left ovary appears enlarged measuring   3.7 x 2.9 x 3.1 cm. Urinary bladder is grossly normal..       Evaluation of the osseous structures demonstrates slight anterolisthesis   of L4 on L5.      IMPRESSION: Multiple lung nodules highly suggestive of lung metastases.  2. Lesion in segment 4 of liver highly suggestive of metastasis.  3. Solid tumor in the pancreatic body with upstream dilatation of   pancreatic duct and atrophy suspect underlying pancreatic adenocarcinoma.  4. Bulky tumor mass involving the proximal sigmoid suggestive of primary   adenocarcinoma with extracolonic extension and obstruction of the left   pelvic ureter causing left hydronephrosis and hydroureter.      ASSESSMENT AND PLAN:  56 yo female, recently diagnosed with metastatic colon cancer and possibly secondary pancreatic cancer, now with partial SBO. Surgery consulted for evaluation for colostomy creation.     Plan for colostomy creation on Friday with Dr. Javier  - pre-op labs  in am (CBC, CMP, Mg, Phos, INR and PTT, type and screen)  - EKG  - CXR  - no bowel prep needed  - NPO after midnight 9/15, may have clears    - rest of care per primary team  - surgery will follow, please call with any questions  - plan discussed with the attending

## 2017-09-13 NOTE — PROGRESS NOTE ADULT - ASSESSMENT
57 year old F w/ PMH of recently diagnosed metastatic colon cancer, HTN, HLD, presenting to [facility] ED with abdominal discomfort and constipation x1 week.

## 2017-09-13 NOTE — PROGRESS NOTE ADULT - PROBLEM SELECTOR PLAN 5
possibly with second malignancy given atrophy in pancreas and mass. PET Avid nodule.   -unclear if metastases are from colon or pancreas. Most likely due to pancreatic primary.   - negative, CEA positive.   -EUS FNA biopsy to be performed tomorrow possibly with second malignancy given atrophy in pancreas and mass. PET Avid nodule.   -unclear if metastases are from colon or pancreas.   - negative, CEA positive.   for liver bx

## 2017-09-13 NOTE — PROGRESS NOTE ADULT - PROBLEM SELECTOR PLAN 1
Suspicion for metastatic colon cancer given liver and lung masses biopsy consistent with adenocarcinoma, 6.4cm on CT imaging with compression of left ureter causing hydronephrosis and colonic obstruction. Of note pancreatic mass 2.4cm is suspicious could be second primary vs. metastasis as found to be PET avid. Urology, GI, and surgery on board. Overall patient has good performance status and will likely be a good candidate for chemotherapy. However, must delineate whether this is unresectable pancreatic ca versus Stage IV colon adenoca as prognosis will be dramatically different with/without chemotherapy. PET/CT confirms hypermetabolic activity in pancreatic body concerning for adenocarcinoma, hypermetabolic mass in sigmoid colon with extension to L ovary, multiple lung masses consistent with metastasis, liver mass consistent with metastatic disease, T9 lesion noted as well.    -lower extremity doppler r/o dvt given LE edema   -f/u final read PET/CT scan  -CEA elevated  -plan for EUS with FNA biopsy for pancreatic lesion  -attempt to obtain Liver biopsy via IR   -repeat attempt at colonic stent planned for   - f/u urology recs for nephrostomy tube  - case discussed with Dr Urias

## 2017-09-13 NOTE — PROGRESS NOTE ADULT - PROBLEM SELECTOR PLAN 1
Patient presented with partial obstruction 2/2 to sigmoid mass. Patient was unable to have stent placement on Monday due to degree of obstruction.   -no signs of acute abdomen  -Patient was NPO since 7p last night s/p 2L Golytely Prep   -Plan for Colonic Stent placement and EUS guided biopsy of pancreatic mass today

## 2017-09-13 NOTE — PROGRESS NOTE ADULT - PROBLEM SELECTOR PLAN 6
-microcytic, Hgb 9.3, was 9.6 last month  -likely bleed from colonic mass  -maintain active type and screen, transfuse for goal >7  -Continue Ferrlecit for a total of 5 days; patient has finished Ferrlecit treatment. -microcytic, Hgb 9.3, was 9.6 last month  -likely bleed from colonic mass  -maintain active type and screen, transfuse for goal >7  -rec'd 5 days of iv Ferrlecit treatment.

## 2017-09-14 DIAGNOSIS — Z01.810 ENCOUNTER FOR PREPROCEDURAL CARDIOVASCULAR EXAMINATION: ICD-10-CM

## 2017-09-14 DIAGNOSIS — I10 ESSENTIAL (PRIMARY) HYPERTENSION: ICD-10-CM

## 2017-09-14 DIAGNOSIS — Z29.9 ENCOUNTER FOR PROPHYLACTIC MEASURES, UNSPECIFIED: ICD-10-CM

## 2017-09-14 DIAGNOSIS — D50.9 IRON DEFICIENCY ANEMIA, UNSPECIFIED: ICD-10-CM

## 2017-09-14 DIAGNOSIS — R63.8 OTHER SYMPTOMS AND SIGNS CONCERNING FOOD AND FLUID INTAKE: ICD-10-CM

## 2017-09-14 LAB
ALBUMIN SERPL ELPH-MCNC: 3.1 G/DL — LOW (ref 3.3–5)
ALP SERPL-CCNC: 86 U/L — SIGNIFICANT CHANGE UP (ref 40–120)
ALT FLD-CCNC: 9 U/L — LOW (ref 10–45)
ANION GAP SERPL CALC-SCNC: 16 MMOL/L — SIGNIFICANT CHANGE UP (ref 5–17)
APTT BLD: 35.6 SEC — SIGNIFICANT CHANGE UP (ref 27.5–37.4)
AST SERPL-CCNC: 13 U/L — SIGNIFICANT CHANGE UP (ref 10–40)
BILIRUB SERPL-MCNC: 0.3 MG/DL — SIGNIFICANT CHANGE UP (ref 0.2–1.2)
BLD GP AB SCN SERPL QL: NEGATIVE — SIGNIFICANT CHANGE UP
BUN SERPL-MCNC: 4 MG/DL — LOW (ref 7–23)
CALCIUM SERPL-MCNC: 9 MG/DL — SIGNIFICANT CHANGE UP (ref 8.4–10.5)
CHLORIDE SERPL-SCNC: 103 MMOL/L — SIGNIFICANT CHANGE UP (ref 96–108)
CO2 SERPL-SCNC: 24 MMOL/L — SIGNIFICANT CHANGE UP (ref 22–31)
CREAT SERPL-MCNC: 1 MG/DL — SIGNIFICANT CHANGE UP (ref 0.5–1.3)
GLUCOSE SERPL-MCNC: 84 MG/DL — SIGNIFICANT CHANGE UP (ref 70–99)
HCT VFR BLD CALC: 27.8 % — LOW (ref 34.5–45)
HGB BLD-MCNC: 8.7 G/DL — LOW (ref 11.5–15.5)
INR BLD: 1.32 — HIGH (ref 0.88–1.16)
MAGNESIUM SERPL-MCNC: 1.7 MG/DL — SIGNIFICANT CHANGE UP (ref 1.6–2.6)
MCHC RBC-ENTMCNC: 21.9 PG — LOW (ref 27–34)
MCHC RBC-ENTMCNC: 31.3 G/DL — LOW (ref 32–36)
MCV RBC AUTO: 69.8 FL — LOW (ref 80–100)
PHOSPHATE SERPL-MCNC: 3.4 MG/DL — SIGNIFICANT CHANGE UP (ref 2.5–4.5)
PLATELET # BLD AUTO: 357 K/UL — SIGNIFICANT CHANGE UP (ref 150–400)
POTASSIUM SERPL-MCNC: 3.6 MMOL/L — SIGNIFICANT CHANGE UP (ref 3.5–5.3)
POTASSIUM SERPL-SCNC: 3.6 MMOL/L — SIGNIFICANT CHANGE UP (ref 3.5–5.3)
PROT SERPL-MCNC: 6.9 G/DL — SIGNIFICANT CHANGE UP (ref 6–8.3)
PROTHROM AB SERPL-ACNC: 14.7 SEC — HIGH (ref 9.8–12.7)
RBC # BLD: 3.98 M/UL — SIGNIFICANT CHANGE UP (ref 3.8–5.2)
RBC # FLD: 19.3 % — HIGH (ref 10.3–16.9)
RH IG SCN BLD-IMP: POSITIVE — SIGNIFICANT CHANGE UP
SODIUM SERPL-SCNC: 143 MMOL/L — SIGNIFICANT CHANGE UP (ref 135–145)
WBC # BLD: 10 K/UL — SIGNIFICANT CHANGE UP (ref 3.8–10.5)
WBC # FLD AUTO: 10 K/UL — SIGNIFICANT CHANGE UP (ref 3.8–10.5)

## 2017-09-14 PROCEDURE — 93010 ELECTROCARDIOGRAM REPORT: CPT

## 2017-09-14 PROCEDURE — 99233 SBSQ HOSP IP/OBS HIGH 50: CPT

## 2017-09-14 PROCEDURE — 71010: CPT | Mod: 26

## 2017-09-14 RX ORDER — AMLODIPINE BESYLATE 2.5 MG/1
5 TABLET ORAL DAILY
Qty: 0 | Refills: 0 | Status: DISCONTINUED | OUTPATIENT
Start: 2017-09-14 | End: 2017-09-16

## 2017-09-14 RX ORDER — SODIUM CHLORIDE 9 MG/ML
1000 INJECTION INTRAMUSCULAR; INTRAVENOUS; SUBCUTANEOUS
Qty: 0 | Refills: 0 | Status: DISCONTINUED | OUTPATIENT
Start: 2017-09-14 | End: 2017-09-14

## 2017-09-14 RX ORDER — SIMVASTATIN 20 MG/1
40 TABLET, FILM COATED ORAL AT BEDTIME
Qty: 0 | Refills: 0 | Status: DISCONTINUED | OUTPATIENT
Start: 2017-09-14 | End: 2017-09-16

## 2017-09-14 RX ORDER — HEPARIN SODIUM 5000 [USP'U]/ML
5000 INJECTION INTRAVENOUS; SUBCUTANEOUS EVERY 8 HOURS
Qty: 0 | Refills: 0 | Status: DISCONTINUED | OUTPATIENT
Start: 2017-09-15 | End: 2017-09-16

## 2017-09-14 RX ORDER — KETOROLAC TROMETHAMINE 30 MG/ML
30 SYRINGE (ML) INJECTION EVERY 6 HOURS
Qty: 0 | Refills: 0 | Status: DISCONTINUED | OUTPATIENT
Start: 2017-09-14 | End: 2017-09-15

## 2017-09-14 RX ORDER — HYDROMORPHONE HYDROCHLORIDE 2 MG/ML
1 INJECTION INTRAMUSCULAR; INTRAVENOUS; SUBCUTANEOUS EVERY 4 HOURS
Qty: 0 | Refills: 0 | Status: DISCONTINUED | OUTPATIENT
Start: 2017-09-14 | End: 2017-09-15

## 2017-09-14 RX ORDER — HYDROMORPHONE HYDROCHLORIDE 2 MG/ML
0.5 INJECTION INTRAMUSCULAR; INTRAVENOUS; SUBCUTANEOUS EVERY 4 HOURS
Qty: 0 | Refills: 0 | Status: DISCONTINUED | OUTPATIENT
Start: 2017-09-14 | End: 2017-09-15

## 2017-09-14 RX ORDER — SODIUM CHLORIDE 9 MG/ML
1000 INJECTION INTRAMUSCULAR; INTRAVENOUS; SUBCUTANEOUS
Qty: 0 | Refills: 0 | Status: DISCONTINUED | OUTPATIENT
Start: 2017-09-14 | End: 2017-09-15

## 2017-09-14 RX ADMIN — HYDROMORPHONE HYDROCHLORIDE 1 MILLIGRAM(S): 2 INJECTION INTRAMUSCULAR; INTRAVENOUS; SUBCUTANEOUS at 18:33

## 2017-09-14 RX ADMIN — HYDROMORPHONE HYDROCHLORIDE 1 MILLIGRAM(S): 2 INJECTION INTRAMUSCULAR; INTRAVENOUS; SUBCUTANEOUS at 19:00

## 2017-09-14 RX ADMIN — SODIUM CHLORIDE 100 MILLILITER(S): 9 INJECTION INTRAMUSCULAR; INTRAVENOUS; SUBCUTANEOUS at 09:41

## 2017-09-14 RX ADMIN — POLYETHYLENE GLYCOL 3350 17 GRAM(S): 17 POWDER, FOR SOLUTION ORAL at 05:55

## 2017-09-14 RX ADMIN — AMLODIPINE BESYLATE 5 MILLIGRAM(S): 2.5 TABLET ORAL at 05:55

## 2017-09-14 NOTE — PROGRESS NOTE ADULT - ASSESSMENT
57yr old F with recent diagnosis of metastatic colon cancer and pancreatic mass with h/o HTN, HLD, obesity was sent to the ED by her oncologist (Dr Urias) for evaluation of abdominal discomfort and constipation x1 week found to have a bulky proximal sigmoid tumor causing L hydrureter/hydronephrosis s/p IR percutaneous nephrostomy.    PLAN -   # Metastatic colon ca with near colonic obstruction  - unable to place palliative colonic stent  - sp colostomy today  - sp IR guided liver biopsy - follow up path  - F/u Heme/Onc input    # Pancreatic mass -   - Likely primary pancreatic adenoca  - follow up heme-onc recs    Discussed with attending Dr Fall  GI will sign off for now please reconsult us if needed

## 2017-09-14 NOTE — PROGRESS NOTE ADULT - PROBLEM SELECTOR PLAN 1
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 second primary. She initially presented with partial obstruction of Sigmoid colon and CATRACHITA due to compression of left ureter.   -GI was unable to successful pass wire past obstruction yesterday. Will plan for diverting colostomy on Friday with Colorectal Surgery team and Dr. Javier.   --Pre-Op Labs and CXR performed, will do ECG today and compare to one on admission.   -Liver biopsy performed by IR yesterday will follow for results - if shows pancreatic cancer then will not need to perform pancreatic biopsy, if shows colon cancer patient may still benefit from pancreatic biopsy.   -Patient s/p Left Nephrostomy to relieve hydronephrosis from tumor compression of left ureter.  -Still waiting on mutations from initial biopsy which can help dictate treatment. 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 second primary. She initially presented with partial obstruction of Sigmoid colon and CATRACHITA due to compression of left ureter.   -GI was unable to successful pass wire past obstruction yesterday. Will plan for diverting colostomy this afternoon with Colorectal Surgery team and Dr. Javier.   --Pre-Op Labs and CXR performed, will do ECG today and compare to one on admission.   -Liver biopsy performed by IR yesterday will follow for results - if shows pancreatic cancer then will not need to perform pancreatic biopsy, if shows colon cancer patient may still benefit from pancreatic biopsy.   -Patient s/p Left Nephrostomy to relieve hydronephrosis from tumor compression of left ureter.  -Still waiting on mutations from initial biopsy which can help dictate treatment.

## 2017-09-14 NOTE — PROGRESS NOTE ADULT - SUBJECTIVE AND OBJECTIVE BOX
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     O/N Events: Patient had failed Stent placement yesterday. CT guided Biopsy of Liver lesion was performed. No biopsy of Pancreatic lesion to date,  Subjective/ROS: Patient seen at bedside. Complaining of mild abdominal pain but states that she is generally doing well. Denies HA, CP, SOB, n/v, changes in bowel/urinary habits.  12pt ROS otherwise negative.    VITALS  Vital Signs Last 24 Hrs  T(C): 36.9 (14 Sep 2017 05:42), Max: 36.9 (14 Sep 2017 05:42)  T(F): 98.4 (14 Sep 2017 05:42), Max: 98.4 (14 Sep 2017 05:42)  HR: 74 (14 Sep 2017 05:42) (73 - 80)  BP: 111/69 (14 Sep 2017 05:42) (111/69 - 143/78)  BP(mean): --  RR: 15 (14 Sep 2017 05:42) (15 - 16)  SpO2: 98% (14 Sep 2017 05:42) (97% - 99%)    CAPILLARY BLOOD GLUCOSE    PHYSICAL EXAM  General: A&Ox3; NAD  Head: NC/AT; MMM; PERRL; EOMI;  Neck: Supple; no JVD  Respiratory: CTA B/L; no wheezes/crackles   Cardiovascular: Regular rhythm/rate; S1/S2   Gastrointestinal: Soft; NTND; normoactive BS  Back: Nephrostomy Tube, draining clear fluid.   Extremities: WWP; no edema/cyanosis  Neurological:  CNII-XII grossly intact; no obvious focal deficits    MEDICATIONS  (STANDING):  simvastatin 40 milliGRAM(s) Oral at bedtime  amLODIPine   Tablet 5 milliGRAM(s) Oral daily  polyethylene glycol 3350 17 Gram(s) Oral two times a day    MEDICATIONS  (PRN):  acetaminophen   Tablet. 650 milliGRAM(s) Oral every 6 hours PRN Moderate Pain (4 - 6)      No Known Allergies      LABS                        8.7    10.0  )-----------( 357      ( 14 Sep 2017 06:56 )             27.8     09-14    143  |  103  |  4<L>  ----------------------------<  84  3.6   |  24  |  1.00    Ca    9.0      14 Sep 2017 06:56  Phos  3.4     09-14  Mg     1.7     09-14    TPro  6.9  /  Alb  3.1<L>  /  TBili  0.3  /  DBili  x   /  AST  13  /  ALT  9<L>  /  AlkPhos  86  09-14    PT/INR - ( 14 Sep 2017 06:56 )   PT: 14.7 sec;   INR: 1.32          PTT - ( 14 Sep 2017 06:56 )  PTT:35.6 sec 57F with PMH of HTN and HLD who presented with partial obstruction of her Sigmoid Colon 2/2 to 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     O/N Events: Patient had failed Stent placement yesterday. CT guided Biopsy of Liver lesion was performed. No biopsy of Pancreatic lesion to date,  Subjective/ROS: Patient seen at bedside. Complaining of mild abdominal pain but states that she is generally doing well. Denies HA, CP, SOB, n/v, changes in bowel/urinary habits.  12pt ROS otherwise negative.    VITALS  Vital Signs Last 24 Hrs  T(C): 36.9 (14 Sep 2017 05:42), Max: 36.9 (14 Sep 2017 05:42)  T(F): 98.4 (14 Sep 2017 05:42), Max: 98.4 (14 Sep 2017 05:42)  HR: 74 (14 Sep 2017 05:42) (73 - 80)  BP: 111/69 (14 Sep 2017 05:42) (111/69 - 143/78)  BP(mean): --  RR: 15 (14 Sep 2017 05:42) (15 - 16)  SpO2: 98% (14 Sep 2017 05:42) (97% - 99%)    CAPILLARY BLOOD GLUCOSE    PHYSICAL EXAM  General: A&Ox3; NAD  Head: NC/AT; MMM; PERRL; EOMI;  Neck: Supple; no JVD  Respiratory: CTA B/L; no wheezes/crackles   Cardiovascular: Regular rhythm/rate; S1/S2   Gastrointestinal: Soft; NTND; normoactive BS  Back: Nephrostomy Tube, draining clear fluid.   Extremities: WWP; no edema/cyanosis  Neurological:  CNII-XII grossly intact; 5/5 str all 4 ext, sens intact blno obvious focal deficits    MEDICATIONS  (STANDING):  simvastatin 40 milliGRAM(s) Oral at bedtime  amLODIPine   Tablet 5 milliGRAM(s) Oral daily  polyethylene glycol 3350 17 Gram(s) Oral two times a day    MEDICATIONS  (PRN):  acetaminophen   Tablet. 650 milliGRAM(s) Oral every 6 hours PRN Moderate Pain (4 - 6)      No Known Allergies      LABS                        8.7    10.0  )-----------( 357      ( 14 Sep 2017 06:56 )             27.8     09-14    143  |  103  |  4<L>  ----------------------------<  84  3.6   |  24  |  1.00    Ca    9.0      14 Sep 2017 06:56  Phos  3.4     09-14  Mg     1.7     09-14    TPro  6.9  /  Alb  3.1<L>  /  TBili  0.3  /  DBili  x   /  AST  13  /  ALT  9<L>  /  AlkPhos  86  09-14    PT/INR - ( 14 Sep 2017 06:56 )   PT: 14.7 sec;   INR: 1.32          PTT - ( 14 Sep 2017 06:56 )  PTT:35.6 sec

## 2017-09-14 NOTE — PROGRESS NOTE ADULT - SUBJECTIVE AND OBJECTIVE BOX
Pt seen and examined at bedside  No new c/o  sp colostomy   Denies n/v/d, some mild pain at colostomy site      MEDICATIONS:  MEDICATIONS  (STANDING):  sodium chloride 0.9%. 1000 milliLiter(s) (100 mL/Hr) IV Continuous <Continuous>  ketorolac   Injectable 30 milliGRAM(s) IV Push every 6 hours  amLODIPine   Tablet 5 milliGRAM(s) Oral daily  simvastatin 40 milliGRAM(s) Oral at bedtime    MEDICATIONS  (PRN):  HYDROmorphone  Injectable 0.5 milliGRAM(s) IV Push every 4 hours PRN Moderate Pain (4 - 6)  HYDROmorphone  Injectable 1 milliGRAM(s) IV Push every 4 hours PRN Severe Pain (7 - 10)      Allergies  No Known Allergies    Intolerances    Vital Signs Last 24 Hrs  T(C): 36.6 (14 Sep 2017 18:29), Max: 37 (14 Sep 2017 16:14)  T(F): 97.9 (14 Sep 2017 18:29), Max: 98.6 (14 Sep 2017 16:14)  HR: 95 (14 Sep 2017 18:29) (70 - 95)  BP: 148/81 (14 Sep 2017 18:29) (111/69 - 163/86)  BP(mean): 96 (14 Sep 2017 18:13) (92 - 114)  RR: 15 (14 Sep 2017 18:29) (10 - 18)  SpO2: 99% (14 Sep 2017 18:29) (95% - 100%)    09-13 @ 07:01 - 09-14 @ 07:00  --------------------------------------------------------  IN: 100 mL / OUT: 75 mL / NET: 25 mL    09-14 @ 07:01  -  09-14 @ 20:18  --------------------------------------------------------  IN: 1500 mL / OUT: 360 mL / NET: 1140 mL      PHYSICAL EXAM:      LABS:  CBC Full  -  ( 14 Sep 2017 06:56 )  WBC Count : 10.0 K/uL  Hemoglobin : 8.7 g/dL  Hematocrit : 27.8 %  Platelet Count - Automated : 357 K/uL  Mean Cell Volume : 69.8 fL  Mean Cell Hemoglobin : 21.9 pg  Mean Cell Hemoglobin Concentration : 31.3 g/dL    09-14    143  |  103  |  4<L>  ----------------------------<  84  3.6   |  24  |  1.00    Ca    9.0      14 Sep 2017 06:56  Phos  3.4     09-14  Mg     1.7     09-14    TPro  6.9  /  Alb  3.1<L>  /  TBili  0.3  /  DBili  x   /  AST  13  /  ALT  9<L>  /  AlkPhos  86  09-14    PT/INR - ( 14 Sep 2017 06:56 )   PT: 14.7 sec;   INR: 1.32       PTT - ( 14 Sep 2017 06:56 )  PTT:35.6 sec      RADIOLOGY & ADDITIONAL STUDIES (The following images were personally reviewed):

## 2017-09-14 NOTE — PROGRESS NOTE ADULT - ASSESSMENT
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

## 2017-09-14 NOTE — PROGRESS NOTE ADULT - PROBLEM SELECTOR PLAN 2
Severely iron deficient with low iron %, total and ferritin, Hg 9, MCV 68.6, likely related to blood loss. No clinical signs of hemolysis, normal bilirubin.     -s/p IV iron supplementation  - pending B12, folate. check daily retic count

## 2017-09-14 NOTE — PROGRESS NOTE ADULT - PROBLEM SELECTOR PLAN 5
Patient has been relatively normotensive during admission, well controlled on Norvasc.  -Will continue Norvasc 5mg Qd

## 2017-09-14 NOTE — PROGRESS NOTE ADULT - SUBJECTIVE AND OBJECTIVE BOX
INTERVAL HPI/OVERNIGHT EVENTS:  Pt seen and examined at bedside this morning and overall reports feeling well.  She notes she's a bit bloated after her IR biopsy procedure yesterday but otherwise deneis any F/C/N/V/SOB/GARCIA.    MEDICATIONS  (STANDING):  simvastatin 40 milliGRAM(s) Oral at bedtime  amLODIPine   Tablet 5 milliGRAM(s) Oral daily  polyethylene glycol 3350 17 Gram(s) Oral two times a day  sodium chloride 0.9%. 1000 milliLiter(s) (100 mL/Hr) IV Continuous <Continuous>    MEDICATIONS  (PRN):  acetaminophen   Tablet. 650 milliGRAM(s) Oral every 6 hours PRN Moderate Pain (4 - 6)      Vital Signs Last 24 Hrs  T(C): 36.9 (14 Sep 2017 05:42), Max: 36.9 (14 Sep 2017 05:42)  T(F): 98.4 (14 Sep 2017 05:42), Max: 98.4 (14 Sep 2017 05:42)  HR: 74 (14 Sep 2017 05:42) (73 - 80)  BP: 111/69 (14 Sep 2017 05:42) (111/69 - 143/78)  BP(mean): --  RR: 15 (14 Sep 2017 05:42) (15 - 16)  SpO2: 98% (14 Sep 2017 05:42) (97% - 99%)    PHYSICAL EXAM:      Constitutional: A&Ox3  Respiratory: non labored breathing, no respiratory distress    Cardiovascular:  RRR    Gastrointestinal: +BS, soft, slight ttp diffusely, no r/g/r, minimal distention.                 Extremities: (-) edema                  I&O's Detail    13 Sep 2017 07:01  -  14 Sep 2017 07:00  --------------------------------------------------------  IN:    Solution: 100 mL  Total IN: 100 mL    OUT:    Nephrostomy Tube: 75 mL  Total OUT: 75 mL    Total NET: 25 mL          LABS:                        8.7    10.0  )-----------( 357      ( 14 Sep 2017 06:56 )             27.8     09-14    143  |  103  |  4<L>  ----------------------------<  84  3.6   |  24  |  1.00    Ca    9.0      14 Sep 2017 06:56  Phos  3.4     09-14  Mg     1.7     09-14    TPro  6.9  /  Alb  3.1<L>  /  TBili  0.3  /  DBili  x   /  AST  13  /  ALT  9<L>  /  AlkPhos  86  09-14    PT/INR - ( 14 Sep 2017 06:56 )   PT: 14.7 sec;   INR: 1.32          PTT - ( 14 Sep 2017 06:56 )  PTT:35.6 sec        RADIOLOGY & ADDITIONAL STUDIES:  INTERPRETATION:  Portable Chest X-Ray dated 9/7/2017 11:24 PM    Indication: colong ca    An AP portable view of the chest is presented without prior chest x-rays   for comparison. Reference is made to CT abdomen and pelvis of 9/2/2017.   Multiple bilateral pulmonary nodules measuring up to 2.4 cm. Normal heart   size. No pleural effusion, CHF or infiltrates. Degenerative changes of   the spine and AC joints. Thoracic scoliosis convex to the right.      IMPRESSION:  Pulmonary nodules compatible with metastasis.

## 2017-09-14 NOTE — BRIEF OPERATIVE NOTE - OPERATION/FINDINGS
Preoperative Diagnosis: metastatic carcinoma with LBO  Postoperative Diagnosis: same  Procedure: loop colostomy creation

## 2017-09-14 NOTE — PROGRESS NOTE ADULT - PROBLEM SELECTOR PLAN 2
See above  -GI was unable to successful pass wire past obstruction yesterday. Will plan for diverting colostomy on Friday with Colorectal Surgery team and Dr. Javier.   --Pre-Op Labs and CXR performed, will do ECG today and compare to one on admission.   -NPO at midnight for procedure tomorrow.   -Holding Lovenox in setting of procdures See above  -GI was unable to successful pass wire past obstruction yesterday. Will plan for diverting colostomy this afternoon with Colorectal Surgery team and Dr. Javier.   --Pre-Op Labs and CXR performed, will do ECG today and compare to one on admission.   -NPO at midnight for procedure tomorrow.   -Holding Lovenox in setting of procedures

## 2017-09-14 NOTE — PROGRESS NOTE ADULT - PROBLEM SELECTOR PLAN 8
P: Holding Lovenox pending surgery   C: FULL CODE  D: Continue on RMF pending diverting colostomy F: 100cc/h Maintenance NS  E: Replete K>4, Mg>2  N: NPO for procedure

## 2017-09-14 NOTE — PROGRESS NOTE ADULT - SUBJECTIVE AND OBJECTIVE BOX
Heme/Onc Progress Note (Dr. Urias)     Interval History: Patient away from bedside upon exam. Patient taken to OR for diverting colostomy via colorectal surgery team and Dr. Javier. S/p IR guided Liver mass biopsy on 9/13.       Allergies    No Known Allergies    Intolerances        Medications:  MEDICATIONS  (STANDING):    MEDICATIONS  (PRN):            PHYSICAL EXAM:    T(F): 98.1 (09-14-17 @ 13:21), Max: 98.4 (09-14-17 @ 05:42)  HR: 85 (09-14-17 @ 13:21) (73 - 85)  BP: 128/74 (09-14-17 @ 13:21) (111/69 - 143/78)  RR: 17 (09-14-17 @ 13:21) (15 - 17)  SpO2: 95% (09-14-17 @ 13:21) (95% - 98%)  Wt(kg): --    Exam: patient away from bedside       Labs:                          8.7    10.0  )-----------( 357      ( 14 Sep 2017 06:56 )             27.8     CBC Full  -  ( 14 Sep 2017 06:56 )  WBC Count : 10.0 K/uL  Hemoglobin : 8.7 g/dL  Hematocrit : 27.8 %  Platelet Count - Automated : 357 K/uL  Mean Cell Volume : 69.8 fL  Mean Cell Hemoglobin : 21.9 pg  Mean Cell Hemoglobin Concentration : 31.3 g/dL  Auto Neutrophil # : x  Auto Lymphocyte # : x  Auto Monocyte # : x  Auto Eosinophil # : x  Auto Basophil # : x  Auto Neutrophil % : x  Auto Lymphocyte % : x  Auto Monocyte % : x  Auto Eosinophil % : x  Auto Basophil % : x    PT/INR - ( 14 Sep 2017 06:56 )   PT: 14.7 sec;   INR: 1.32          PTT - ( 14 Sep 2017 06:56 )  PTT:35.6 sec    09-14    143  |  103  |  4<L>  ----------------------------<  84  3.6   |  24  |  1.00    Ca    9.0      14 Sep 2017 06:56  Phos  3.4     09-14  Mg     1.7     09-14    TPro  6.9  /  Alb  3.1<L>  /  TBili  0.3  /  DBili  x   /  AST  13  /  ALT  9<L>  /  AlkPhos  86  09-14          Other Labs:    Cultures:    Pathology:    Imaging Studies:

## 2017-09-14 NOTE — PROGRESS NOTE ADULT - PROBLEM SELECTOR PLAN 6
Patient with initial CATRACHITA most likely 2/2 multifactorial due to obstruction with left hydronephrosis mixed with pre-renal causes.  -Cr 1.00 today improved from initial Cr. Will continue to monitor BMP

## 2017-09-14 NOTE — PROGRESS NOTE ADULT - PROBLEM SELECTOR PLAN 4
Patient was found to be iron deficient with microcytic anemia on admission. Now s/p 5 days Ferrlecit.   -No further indication for iron at this time. Would plan to draw reticulocyte count Monday if patient still present.

## 2017-09-14 NOTE — PROGRESS NOTE ADULT - PROBLEM SELECTOR PLAN 7
F: 100cc/h Maintenance NS  E: Replete K>4, Mg>2  N: NPO for procedure pt without active cardiac issues/major clinical risk predictors (angina/chf/arrythmia) currently  METS> 4  RCRI = 1  pt is at low cardiac risk for moderate risk procedure.   pt may proceed with planned surgery w/o further cardiac testing.   RCRI =

## 2017-09-14 NOTE — PROGRESS NOTE ADULT - ASSESSMENT
57y Female s/p loop colostomy creation  NPO/IVF  Pain/nausea control  DVT ppx  OOBA/IS  Management per medicine team  AM labs

## 2017-09-14 NOTE — PROGRESS NOTE ADULT - ASSESSMENT
58 yo female, recently diagnosed with metastatic colon cancer and possibly secondary pancreatic cancer, with near obstructing mass in the colon that was not amenable to colonoscopic stenting.    - Plan for OR today with Dr. Javier for transverse loop colostomy.  - no bowel prep needed  - NPO/IVF  - T/S up to date, EKG/CXR/Consent in chart  - rest of care per primary team  - surgery will follow, please call with any questions  - plan discussed with the attending

## 2017-09-14 NOTE — PROGRESS NOTE ADULT - SUBJECTIVE AND OBJECTIVE BOX
POST-OPERATIVE NOTE    Procedure: loop colostomy creation    Diagnosis/Indication: metastatic carcinoma with LBO    Surgeon: Dr. Javier    S: Pt has no complaints. Denies CP, SOB, GARCIA, calf tenderness. Pain controlled with medication. Denies nausea, vomiting.    O:  T(C): 36.6 (09-14-17 @ 18:29), Max: 37 (09-14-17 @ 16:14)  T(F): 97.9 (09-14-17 @ 18:29), Max: 98.6 (09-14-17 @ 16:14)  HR: 95 (09-14-17 @ 18:29) (70 - 95)  BP: 148/81 (09-14-17 @ 18:29) (136/74 - 163/86)  RR: 15 (09-14-17 @ 18:29) (10 - 18)  SpO2: 99% (09-14-17 @ 18:29) (95% - 100%)  Wt(kg): --                        8.7    10.0  )-----------( 357      ( 14 Sep 2017 06:56 )             27.8     09-14    143  |  103  |  4<L>  ----------------------------<  84  3.6   |  24  |  1.00    Ca    9.0      14 Sep 2017 06:56  Phos  3.4     09-14  Mg     1.7     09-14    TPro  6.9  /  Alb  3.1<L>  /  TBili  0.3  /  DBili  x   /  AST  13  /  ALT  9<L>  /  AlkPhos  86  09-14      Gen: NAD, resting comfortably in bed  C/V: NSR  Pulm: Nonlabored breathing, no respiratory distress  Abd: soft, ATTP, nondistended, ostomy patent with 10cc dark red output  Extrem: WWP, no calf edema or tenderness

## 2017-09-14 NOTE — PROGRESS NOTE ADULT - PROBLEM SELECTOR PLAN 1
Suspicion for metastatic colon cancer given liver and lung masses biopsy consistent with adenocarcinoma, 6.4cm on CT imaging with compression of left ureter causing hydronephrosis and colonic obstruction. CEA elevated. Overall patient has good performance status and will likely be a good candidate for chemotherapy. However, must delineate whether this is unresectable pancreatic ca versus Stage IV colon adenoca as prognosis will be dramatically different with/without chemotherapy. PET/CT confirms hypermetabolic activity in pancreatic body concerning for adenocarcinoma, hypermetabolic mass in sigmoid colon with extension to L ovary, multiple lung masses consistent with metastasis, liver mass consistent with metastatic disease, T9 lesion noted as well. Patient is s/p IR guided Liver mass biopsy. Unable to place colonic stent via GI so planned for surgery.     -follow up liver pathology   -repeat attempt at colonic stent failed, patient planned for diverting colostomy on 9/14  -will need to plan for EUS with FNA biopsy for pancreatic lesion as outpatient  -urology for nephrostomy recs   - case discussed with Dr Urias

## 2017-09-15 ENCOUNTER — TRANSCRIPTION ENCOUNTER (OUTPATIENT)
Age: 57
End: 2017-09-15

## 2017-09-15 DIAGNOSIS — N13.39 OTHER HYDRONEPHROSIS: ICD-10-CM

## 2017-09-15 DIAGNOSIS — Z43.3 ENCOUNTER FOR ATTENTION TO COLOSTOMY: ICD-10-CM

## 2017-09-15 DIAGNOSIS — E87.2 ACIDOSIS: ICD-10-CM

## 2017-09-15 LAB
ANION GAP SERPL CALC-SCNC: 18 MMOL/L — HIGH (ref 5–17)
BUN SERPL-MCNC: 10 MG/DL — SIGNIFICANT CHANGE UP (ref 7–23)
CALCIUM SERPL-MCNC: 9.1 MG/DL — SIGNIFICANT CHANGE UP (ref 8.4–10.5)
CHLORIDE SERPL-SCNC: 102 MMOL/L — SIGNIFICANT CHANGE UP (ref 96–108)
CO2 SERPL-SCNC: 20 MMOL/L — LOW (ref 22–31)
CREAT SERPL-MCNC: 1.22 MG/DL — SIGNIFICANT CHANGE UP (ref 0.5–1.3)
GLUCOSE SERPL-MCNC: 118 MG/DL — HIGH (ref 70–99)
HCT VFR BLD CALC: 26.3 % — LOW (ref 34.5–45)
HGB BLD-MCNC: 8 G/DL — LOW (ref 11.5–15.5)
MAGNESIUM SERPL-MCNC: 1.6 MG/DL — SIGNIFICANT CHANGE UP (ref 1.6–2.6)
MCHC RBC-ENTMCNC: 21.6 PG — LOW (ref 27–34)
MCHC RBC-ENTMCNC: 30.4 G/DL — LOW (ref 32–36)
MCV RBC AUTO: 70.9 FL — LOW (ref 80–100)
NON-GYNECOLOGICAL CYTOLOGY STUDY: SIGNIFICANT CHANGE UP
PLATELET # BLD AUTO: 323 K/UL — SIGNIFICANT CHANGE UP (ref 150–400)
POTASSIUM SERPL-MCNC: 3.9 MMOL/L — SIGNIFICANT CHANGE UP (ref 3.5–5.3)
POTASSIUM SERPL-SCNC: 3.9 MMOL/L — SIGNIFICANT CHANGE UP (ref 3.5–5.3)
RBC # BLD: 3.71 M/UL — LOW (ref 3.8–5.2)
RBC # FLD: 19.5 % — HIGH (ref 10.3–16.9)
SODIUM SERPL-SCNC: 140 MMOL/L — SIGNIFICANT CHANGE UP (ref 135–145)
SURGICAL PATHOLOGY STUDY: SIGNIFICANT CHANGE UP
WBC # BLD: 12.7 K/UL — HIGH (ref 3.8–10.5)
WBC # FLD AUTO: 12.7 K/UL — HIGH (ref 3.8–10.5)

## 2017-09-15 PROCEDURE — 99233 SBSQ HOSP IP/OBS HIGH 50: CPT

## 2017-09-15 RX ORDER — MAGNESIUM SULFATE 500 MG/ML
4 VIAL (ML) INJECTION ONCE
Qty: 0 | Refills: 0 | Status: COMPLETED | OUTPATIENT
Start: 2017-09-15 | End: 2017-09-15

## 2017-09-15 RX ADMIN — HEPARIN SODIUM 5000 UNIT(S): 5000 INJECTION INTRAVENOUS; SUBCUTANEOUS at 23:18

## 2017-09-15 RX ADMIN — HEPARIN SODIUM 5000 UNIT(S): 5000 INJECTION INTRAVENOUS; SUBCUTANEOUS at 16:58

## 2017-09-15 RX ADMIN — SIMVASTATIN 40 MILLIGRAM(S): 20 TABLET, FILM COATED ORAL at 23:19

## 2017-09-15 RX ADMIN — SODIUM CHLORIDE 100 MILLILITER(S): 9 INJECTION INTRAMUSCULAR; INTRAVENOUS; SUBCUTANEOUS at 11:07

## 2017-09-15 RX ADMIN — Medication 30 MILLIGRAM(S): at 06:32

## 2017-09-15 RX ADMIN — AMLODIPINE BESYLATE 5 MILLIGRAM(S): 2.5 TABLET ORAL at 06:18

## 2017-09-15 RX ADMIN — Medication 30 MILLIGRAM(S): at 06:18

## 2017-09-15 RX ADMIN — Medication 50 GRAM(S): at 11:08

## 2017-09-15 RX ADMIN — SODIUM CHLORIDE 100 MILLILITER(S): 9 INJECTION INTRAMUSCULAR; INTRAVENOUS; SUBCUTANEOUS at 06:22

## 2017-09-15 NOTE — PROGRESS NOTE ADULT - PROBLEM SELECTOR PLAN 7
F: Can D/C fluids in setting of good oral intake  E: Replete K>4, Mg>2  N: Regular Diet Patient with initial CATRACHITA most likely 2/2 multifactorial due to obstruction with left hydronephrosis mixed with pre-renal causes.  -Cr 1.20 today improved from initial Cr. Will continue to monitor BMP  -Patient received Ketorolac this AM s/p surgery as per Surgery rec's; will attempt to avoid Kidney harming medications in future.

## 2017-09-15 NOTE — PROGRESS NOTE ADULT - PROBLEM SELECTOR PLAN 1
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 second primary. She initially presented with partial obstruction of Sigmoid colon and CATRACHITA due to compression of left ureter.   -POD#1 S/P Loop Colostomy, site appears well and ostomy is draining red tinged stool.   -Liver biopsy performed by IR - pathology shows metastatic Colon cancer  -Patient s/p Left Nephrostomy to relieve hydronephrosis from tumor compression of left ureter.  -Still waiting on mutations from initial biopsy which can help dictate treatment. Patient with biopsy proven Adenocarcinoma of Sigmoid w/ confirmed mets to liver.   She initially presented with partial obstruction of Sigmoid colon and CATRACHITA due to compression of left ureter.   -POD#1 S/P Loop Colostomy, site appears well and ostomy is draining red tinged stool.   -Liver biopsy performed by IR - pathology shows metastatic Colon cancer  -Patient s/p Left Nephrostomy to relieve hydronephrosis from tumor compression of left ureter.  -Still waiting on mutations from initial biopsy which can help dictate treatment.

## 2017-09-15 NOTE — PROGRESS NOTE ADULT - ASSESSMENT
57y Female with a malignant near large bowel obstruction now s/p loop colostomy creation pod1  -Tolerating sips of clears, would continue clears until pt has further decompressed her bowel and appetite has returned.  -Pain/nausea control PRN  -DVT ppx  -OOBA/IS  -Management per medicine team  -Discussed with Dr. Javier 57y Female with a malignant near large bowel obstruction now s/p loop colostomy creation pod1  -Tolerating sips of clears, would continue clears until pt has further decompressed her bowel and appetite has returned.  -Recommend Ostomy RN consult  -Pain/nausea control PRN  -DVT ppx  -OOBA/IS  -Management per medicine team  -Discussed with Dr. Javier

## 2017-09-15 NOTE — DISCHARGE NOTE ADULT - PATIENT PORTAL LINK FT
“You can access the [employer] Patient Portal, offered by [facility], by registering with the following website: [email or web address]”

## 2017-09-15 NOTE — DISCHARGE NOTE ADULT - HOSPITAL COURSE
57F with PMH of HLD and HTN who presented from Dr. Urias's office with a recent diagnosis of Stage IV Adenocarcinoma with partial bowel obstruction of her sigmoid colon and infiltration of the left ureter with left hydronephrosis. During her admission she had a left percutaneous nephrostomy tube placed with resolution of the left hydronephrosis. A PET-CT was performed which showed masses in the Colon, Liver, Pancreas, and multiple in the Lung.  She also had a CT guided biopsy of the Liver mass which showed metastatic Adenocarcinoma of the colon. GI tried to perform stenting of the partial obstruction of the sigmoid colon but were unable to pass the wire through the obstruction. Colorectal surgery performed a loop colostomy and the patient tolerated the procedure well. On day of discharge the patient had drainage into colostomy bag, was able to tolerate a regular diet, and had resolution of CATRACHITA with downtrending Cr. Decision was made to discharge the patient to home with visiting nurse services for Colostomy education and to follow up with Dr. Urias as an outpatient.

## 2017-09-15 NOTE — PROGRESS NOTE ADULT - PROBLEM SELECTOR PLAN 1
Patient underwent loop colostomy on 9/14/17. This included removal of the sigmoid colon mass and addressed the obstruction causing constipation.    Liver mass biopsy confirmed metastasis from colon adenocarcinoma. (Positive for CDX-2 and CK20, Negative for CK7. Biopsy sent for further genetic testing.) Chemotherapy will begin outpatient as will biopsy of pancreatic mass.

## 2017-09-15 NOTE — PROGRESS NOTE ADULT - PROBLEM SELECTOR PROBLEM 9
HTN (hypertension)
High cholesterol
High cholesterol
Need for prophylactic measure
Need for prophylactic measure

## 2017-09-15 NOTE — PROGRESS NOTE ADULT - PROBLEM SELECTOR PLAN 2
Severely iron deficient with low iron %, total and ferritin likely related to blood loss. No clinical signs of hemolysis, normal bilirubin.     -s/p IV iron supplementation  - pending B12, folate. check daily retic  -daily CBC, obtain coags as midly coagulopathic

## 2017-09-15 NOTE — DISCHARGE NOTE ADULT - PLAN OF CARE
During your stay you were found to have iron deficiency anemia most likely due to bleeding 2/2 to cancer. You were treated with Ferrlecit for 5 days which should replete your iron stores. Dr. Urias will be able to monitor your anemia as an outpatient. To alleviate symptoms due to metastases and acquire biopsy of masses You were admitted to the hospital due to mass compression of your left ureter and a partial large bowel obstruction. During your stay you had multiple procedures including CT guided biopsy of the liver, diverting loop colostomy, left percutaneous nephrostomy, and colonoscopies with attempted stent placement. Please continue to follow up with Dr. Urias as an outpatient. You were admitted to the hospital due to mass compression of your left ureter and a partial large bowel obstruction. During your stay you had multiple procedures including CT guided biopsy of the liver, diverting loop colostomy, left percutaneous nephrostomy, and colonoscopies with attempted stent placement. Please continue to follow up with Dr. Urias as an outpatient on Tuesday September 19th at 12:00. You were found to have compression of the left ureter on admission which caused fluid to back up into your kidney. A Left Nephrostomy tube was placed into your kidney to help alleviate the increased pressure. You will need to change the dressing every 3 days. Also every other day you should use a syringe to inject saline into the line. This is called "flushing" the line. Please follow up with the Interventional Radiology team here every 3 months for replacement of the tube.

## 2017-09-15 NOTE — PROGRESS NOTE ADULT - PROBLEM SELECTOR PLAN 4
Patient was found to be iron deficient with microcytic anemia on admission. Now s/p 5 days Ferrlecit.   -No further indication for iron at this time. Would plan to draw reticulocyte count Monday if patient still present. s/p left PCN  f/u urology as outpt

## 2017-09-15 NOTE — DISCHARGE NOTE ADULT - ADDITIONAL INSTRUCTIONS
Please follow up with Dr. Urias as an outpatient. Please follow up with Dr. Urias as an outpatient on Tuesday September 19th at 12:00.

## 2017-09-15 NOTE — PROGRESS NOTE ADULT - PROBLEM SELECTOR PLAN 8
Monitoring patient's blood work and iron studies (Hb 8.0 and Hct 26.3 on 9/15/17--decreased, but no significant drop). Set up follow up appointment for that will monitor/address her microcytic, likely iron-deficient anemia.

## 2017-09-15 NOTE — PROGRESS NOTE ADULT - SUBJECTIVE AND OBJECTIVE BOX
INTERVAL HPI/OVERNIGHT EVENTS: Pt seen and examined at bedside this morning.  She reports that the evening went well and that her pain has been controlled.  She notes that her preoperative abdominal discomfort is now resolving.  She doesn't quite have a return of appetite, however tolerated sips of water overnight.  This morning she denies any CP, chest pressure, SOB, dyspnea, N/V.    STATUS POST:  Loop colostomy creation    POST OPERATIVE DAY #: 1    MEDICATIONS  (STANDING):  sodium chloride 0.9%. 1000 milliLiter(s) (100 mL/Hr) IV Continuous <Continuous>  ketorolac   Injectable 30 milliGRAM(s) IV Push every 6 hours  amLODIPine   Tablet 5 milliGRAM(s) Oral daily  simvastatin 40 milliGRAM(s) Oral at bedtime  magnesium sulfate  IVPB 4 Gram(s) IV Intermittent once    MEDICATIONS  (PRN):  HYDROmorphone  Injectable 0.5 milliGRAM(s) IV Push every 4 hours PRN Moderate Pain (4 - 6)  HYDROmorphone  Injectable 1 milliGRAM(s) IV Push every 4 hours PRN Severe Pain (7 - 10)      Vital Signs Last 24 Hrs  T(C): 36.4 (15 Sep 2017 05:40), Max: 37 (14 Sep 2017 16:14)  T(F): 97.6 (15 Sep 2017 05:40), Max: 98.6 (14 Sep 2017 16:14)  HR: 72 (15 Sep 2017 05:40) (70 - 95)  BP: 101/58 (15 Sep 2017 05:40) (101/58 - 163/86)  BP(mean): 96 (14 Sep 2017 18:13) (92 - 114)  RR: 15 (15 Sep 2017 05:40) (10 - 18)  SpO2: 98% (15 Sep 2017 05:40) (95% - 100%)    PHYSICAL EXAM:      Constitutional: A&Ox3  Respiratory: CTAB  Cardiovascular: RRR  Gastrointestinal: +BS (hypoactive), soft, nontender, minimally distended.                 Stoma: Pink, perfused and patent, small amount of stool output in the bag, no air in bag as of yet.  Extremities: (-) edema        I&O's Detail    14 Sep 2017 07:01  -  15 Sep 2017 07:00  --------------------------------------------------------  IN:    Lactated Ringers IV Bolus: 1000 mL    sodium chloride 0.9%.: 1600 mL  Total IN: 2600 mL    OUT:    Colostomy: 225 mL    Nephrostomy Tube: 310 mL  Total OUT: 535 mL    Total NET: 2065 mL          LABS:                        8.0    12.7  )-----------( 323      ( 15 Sep 2017 06:24 )             26.3     09-15    140  |  102  |  10  ----------------------------<  118<H>  3.9   |  20<L>  |  1.22    Ca    9.1      15 Sep 2017 06:24  Phos  3.4     09-14  Mg     1.6     09-15    TPro  6.9  /  Alb  3.1<L>  /  TBili  0.3  /  DBili  x   /  AST  13  /  ALT  9<L>  /  AlkPhos  86  09-14    PT/INR - ( 14 Sep 2017 06:56 )   PT: 14.7 sec;   INR: 1.32          PTT - ( 14 Sep 2017 06:56 )  PTT:35.6 sec

## 2017-09-15 NOTE — DISCHARGE NOTE ADULT - MEDICATION SUMMARY - MEDICATIONS TO TAKE
I will START or STAY ON the medications listed below when I get home from the hospital:    simvastatin 40 mg oral tablet  -- 1 tab(s) by mouth once a day (at bedtime)  -- Indication: For High cholesterol    amLODIPine 5 mg oral tablet  -- 1 tab(s) by mouth once a day  -- Indication: For HTN (hypertension)

## 2017-09-15 NOTE — PROGRESS NOTE ADULT - PROBLEM SELECTOR PLAN 4
Renal function has been improving with nephrostomy tube placement. Monitor renal function post-operation and pain management.

## 2017-09-15 NOTE — ADVANCED PRACTICE NURSE CONSULT - RECOMMEDATIONS
Patient would benefit from home care services to reinforce ostomy instructions. Informed house staff of the same.

## 2017-09-15 NOTE — PROGRESS NOTE ADULT - ASSESSMENT
Ms. Hebert is a 57 year old female with a PMH of HTN, HLD, and cancerous colonic polyp removal with a recent diagnosis of stage IV adenocarcinoma of the sigmoid colon with mets to the liver and lungs and pancreatic mass either met vs. second primary. Patient was sent to [facility] for constipation and left hydroureteronephrosis and hydronephrosis secondary to the sigmoid colon mass and extracolonic invasion/compression. Management of post-renal obstruction was done by percutaneous nephrostomy tube and management of constipation was done with colostomy after 2 attempts a colonic stenting. Pending patient's tolerance of per os feeding and collection of stool in colostomy bag, patient will likely be ready for discharge. She will have a pancreatic biopsy and begin chemotherapy as an outpatient.

## 2017-09-15 NOTE — PROGRESS NOTE ADULT - PROBLEM SELECTOR PLAN 8
P: F/U with Surgery regarding when Lovenox can be restarted  C: FULL CODE  D: Continue on RMF pending diverting colostomy teaching, plan D/C tomorrow F: Can D/C fluids in setting of good oral intake  E: Replete K>4, Mg>2  N: Regular Diet

## 2017-09-15 NOTE — PROGRESS NOTE ADULT - PROBLEM SELECTOR PLAN 2
Ms. Oconnell's colostomy is currently functioning well and has output. Advance diet from clear liquids as tolerated. Begin education on colostomy care as well as order wound care consultation.

## 2017-09-15 NOTE — PROGRESS NOTE ADULT - PROBLEM SELECTOR PLAN 1
Suspicion for metastatic colon cancer given liver and lung masses biopsy consistent with adenocarcinoma, 6.4cm on CT imaging with compression of left ureter causing hydronephrosis and colonic obstruction. CEA elevated. Overall patient has good performance status and will likely be a good candidate for chemotherapy. However, must delineate whether this is unresectable pancreatic ca versus Stage IV colon adenoca as prognosis will be dramatically different with/without chemotherapy. PET/CT confirms hypermetabolic activity in pancreatic body concerning for adenocarcinoma, hypermetabolic mass in sigmoid colon with extension to L ovary, multiple lung masses consistent with metastasis, liver mass consistent with metastatic disease, T9 lesion noted as well. Patient is s/p IR guided Liver mass biopsy found to be metastatic adenocarincoma consistent with colon primary, immunohistochemical stains show tumor cells to be positive for  CDX-2 and CK20 and negative for CK7. Patient s/p diverting loop ileostomy as unable to place colonic stent, tolerated procedure, advancing diet.     -EUS with FNA biopsy for pancreatic lesion as outpatient  -will plan for chemotherapy in the outpatient once receive all tissue  -awaiting molecular markers    - case discussed with Dr Urias

## 2017-09-15 NOTE — PROGRESS NOTE ADULT - PROBLEM SELECTOR PROBLEM 3
Hydronephrosis, left
Pancreatic abnormality
Pancreatic abnormality
Hydronephrosis, left

## 2017-09-15 NOTE — PROGRESS NOTE ADULT - SUBJECTIVE AND OBJECTIVE BOX
O/N Events: DANIEL  Subjective/ROS: Patient states that she is feeling well this morning. Pain was well controlled overnight. Denies HA, CP, SOB, n/v, changes in bowel/urinary habits.  12pt ROS otherwise negative.    VITALS  Vital Signs Last 24 Hrs  T(C): 36.7 (15 Sep 2017 09:08), Max: 37 (14 Sep 2017 16:14)  T(F): 98.1 (15 Sep 2017 09:08), Max: 98.6 (14 Sep 2017 16:14)  HR: 70 (15 Sep 2017 09:08) (70 - 95)  BP: 109/65 (15 Sep 2017 09:08) (101/58 - 163/86)  BP(mean): 96 (14 Sep 2017 18:13) (92 - 114)  RR: 16 (15 Sep 2017 09:08) (10 - 18)  SpO2: 97% (15 Sep 2017 09:08) (95% - 100%)    CAPILLARY BLOOD GLUCOSE    PHYSICAL EXAM performed by MS4 confirmed by MD GAO: no sclera icterus,   Neuro: CN II - XII grossly intact, no focal deficits,   CV: normal S1,S2, no Murmurs, no gallops, no rubs appreciated  Respiratory: lungs clear to auscultation bilaterally, no wheezes, no rales, no rhonci  GI: soft, non distended, nontender except around ostomy site, ostomy site clean & draining slightly red stool  no CVA tenderness, nephrostomy tube draining clear liquid, no tenderness around nephrostomy tube site  Vascular: pulse palpable bilaterally  Extremities: no pedal swelling or edema    MEDICATIONS  (STANDING):  sodium chloride 0.9%. 1000 milliLiter(s) (100 mL/Hr) IV Continuous <Continuous>  heparin  Injectable 5000 Unit(s) SubCutaneous every 8 hours  amLODIPine   Tablet 5 milliGRAM(s) Oral daily  simvastatin 40 milliGRAM(s) Oral at bedtime    MEDICATIONS  (PRN):      No Known Allergies      LABS                        8.0    12.7  )-----------( 323      ( 15 Sep 2017 06:24 )             26.3     09-15    140  |  102  |  10  ----------------------------<  118<H>  3.9   |  20<L>  |  1.22    Ca    9.1      15 Sep 2017 06:24  Phos  3.4     09-14  Mg     1.6     09-15    TPro  6.9  /  Alb  3.1<L>  /  TBili  0.3  /  DBili  x   /  AST  13  /  ALT  9<L>  /  AlkPhos  86  09-14    PT/INR - ( 14 Sep 2017 06:56 )   PT: 14.7 sec;   INR: 1.32          PTT - ( 14 Sep 2017 06:56 )  PTT:35.6 sec        Surgical Pathology Report (09.13.17 @ 17:00)    Surgical Pathology Report:   ACCESSION No:  [id number]    DUGLAS CARRILLO                        1        Surgical Final Report          Final Diagnosis    Liver; core biopsy:  - Metastatic adenocarcinoma, consistent with colorectal  primary.    Note:  Immunohistochemical stains show tumor cells to be positive for  CDX-2 and CK20 and negative for CK7. The  material will be forwarded for MSI and molecular testing; results  will be reported in an addendum.    Christianne Matute M.D.  (Electronic Signature)  Reported on: 09/15/17    Clinical History  Metastatic colon cancer/pancreatic mass.  HTN.    Specimen(s) Submitted  Liver  biopsy    Gross Description  The specimen is received in formalin, labeled with the patient's  identification and "liver". It consists of four soft, pink to  white cores that range in length from 2.2-2.5 cm; and are 0.1 cm  in diameter.  The specimen is submitted in two cassettes, 1A-1B-  two cores, each.  LR 09/13/17 17:10 O/N Events: DANIEL  Subjective/ROS: Patient states that she is feeling well this morning. post diverting colostomy, mild abd  pain at colostomy site. (+) stool seen in bag.     ROS  (- ) headache  ( -  )fevers/chills  ( - ) dyspnea  (  - ) cough  (  - ) chest pain  (  - ) palpatations  ( - ) dizziness/lightheadedness  (  - ) nausea/vomiting  (  - ) diarrhea  (  - ) melena  (  - ) hematochezia  (  - ) dysuria   ( - ) hematuria  (  - ) leg swelling    ( - ) calf tenderness  (  - ) motor weakness  ( - ) extremity numbness  ( - ) back pain  ( + ) tolerating POs  ( + ) BM  ROS: 12 point review of systems otherwise negative       12pt ROS otherwise negative.    VITALS  Vital Signs Last 24 Hrs  T(C): 36.7 (15 Sep 2017 09:08), Max: 37 (14 Sep 2017 16:14)  T(F): 98.1 (15 Sep 2017 09:08), Max: 98.6 (14 Sep 2017 16:14)  HR: 70 (15 Sep 2017 09:08) (70 - 95)  BP: 109/65 (15 Sep 2017 09:08) (101/58 - 163/86)  BP(mean): 96 (14 Sep 2017 18:13) (92 - 114)  RR: 16 (15 Sep 2017 09:08) (10 - 18)  SpO2: 97% (15 Sep 2017 09:08) (95% - 100%)    CAPILLARY BLOOD GLUCOSE    PHYSICAL EXAM   HEENT: no sclera icterus,   Neuro: CN II - XII grossly intact, no focal deficits, 5/5 str all 4 ext, sens intact bl  CV: normal S1,S2, no Murmurs, no gallops, no rubs appreciated  Respiratory: lungs clear to auscultation bilaterally, no wheezes, no rales, no rhonci  GI: soft, non distended, mild tenderness around ostomy site, ostomy site clean & draining slightly red stool  no CVA tenderness, nephrostomy tube draining clear liquid, no tenderness around nephrostomy tube site  Vascular: pulse palpable bilaterally (radial 2+, DP 2+)  Extremities: no pedal swelling or edema    MEDICATIONS  (STANDING):  sodium chloride 0.9%. 1000 milliLiter(s) (100 mL/Hr) IV Continuous <Continuous>  heparin  Injectable 5000 Unit(s) SubCutaneous every 8 hours  amLODIPine   Tablet 5 milliGRAM(s) Oral daily  simvastatin 40 milliGRAM(s) Oral at bedtime    MEDICATIONS  (PRN):      No Known Allergies      LABS                        8.0    12.7  )-----------( 323      ( 15 Sep 2017 06:24 )             26.3     09-15    140  |  102  |  10  ----------------------------<  118<H>  3.9   |  20<L>  |  1.22    Ca    9.1      15 Sep 2017 06:24  Phos  3.4     09-14  Mg     1.6     09-15    TPro  6.9  /  Alb  3.1<L>  /  TBili  0.3  /  DBili  x   /  AST  13  /  ALT  9<L>  /  AlkPhos  86  09-14    PT/INR - ( 14 Sep 2017 06:56 )   PT: 14.7 sec;   INR: 1.32          PTT - ( 14 Sep 2017 06:56 )  PTT:35.6 sec        Surgical Pathology Report (09.13.17 @ 17:00)    Surgical Pathology Report:   ACCESSION No:  [id number]    DUGLAS CARRILLO                        1        Surgical Final Report          Final Diagnosis    Liver; core biopsy:  - Metastatic adenocarcinoma, consistent with colorectal  primary.    Note:  Immunohistochemical stains show tumor cells to be positive for  CDX-2 and CK20 and negative for CK7. The  material will be forwarded for MSI and molecular testing; results  will be reported in an addendum.    Christianne Matute M.D.  (Electronic Signature)  Reported on: 09/15/17    Clinical History  Metastatic colon cancer/pancreatic mass.  HTN.    Specimen(s) Submitted  Liver  biopsy    Gross Description  The specimen is received in formalin, labeled with the patient's  identification and "liver". It consists of four soft, pink to  white cores that range in length from 2.2-2.5 cm; and are 0.1 cm  in diameter.  The specimen is submitted in two cassettes, 1A-1B-  two cores, each.  LR 09/13/17 17:10

## 2017-09-15 NOTE — PROGRESS NOTE ADULT - PROBLEM SELECTOR PLAN 3
Patient found to have PET avid Pancreatic mass. Unlikely that this mass is metastatic colon cancer and most likely represents a second primary lesion. Without tissue cannot confirm.   -Based on Tumor Board discussion whether second primary vs. metastatic unlikely to  and therefore may not need biopsy.  -Will follow up with Heme/Onc about need/utility of pancreatic biopsy.

## 2017-09-15 NOTE — ADVANCED PRACTICE NURSE CONSULT - ASSESSMENT
Stoma pale red, oval shaped measuring apx 2 1/2 inches with red laura in place; small amount of serosanguinous effluent noted in pouch. Peristomal skin intact. Patient's sister present at bedside, stated she has an ostomy and will support patient post discharge. Instructed patient on ostomy care and pouching system change. Also instructed patient on emptying and closing pouch. Patient participated and demonstrated good understanding.   Provided patient with written instructions and ostomy supplies pending ordering by home care nurse. Faxed enrollment form to [employer]. Provided patient/house staff with list of required ostomy supplies needing prescriptions.

## 2017-09-15 NOTE — PROGRESS NOTE ADULT - PROBLEM SELECTOR PLAN 9
-c/w amlodipine 5 mg daily
-c/w simvastatin 40 mg daily    Diet: NPO  DVT prophylaxis: hold lovenox pending procedure  FULL CODE  Continue on RMF
-c/w simvastatin 40 mg daily    Diet: NPO  DVT prophylaxis: hold lovenox pending procedure  FULL CODE  Continue on RMF pending stent placement and biopsies
P: Holding Lovenox pending surgery   C: FULL CODE  D: Continue on RMF pending diverting colostomy
P: F/U with Surgery regarding when Lovenox can be restarted  C: FULL CODE  D: Continue on RMF pending diverting colostomy teaching, plan D/C tomorrow

## 2017-09-15 NOTE — PROGRESS NOTE ADULT - PROBLEM SELECTOR PLAN 3
Left nephrostomy tube in place and draining clear/yellow urine. Patient reported that her lower back pain has seemed to improve since placement. Keep nephrostomy tube in place and maintain.

## 2017-09-15 NOTE — ADVANCED PRACTICE NURSE CONSULT - REASON FOR CONSULT
WOCN consult for 57 year old F w/ PMH of recently diagnosed metastatic colon cancer, HTN, HLD, presenting to [facility] ED with abdominal discomfort and constipation x1 week, sent in by oncologist Dr. Urias. Since March of 2017, patient has had a 30 lb weight loss with diarrhea, decreased appetite and abdominal discomfort. She initially thought it was IBS but had a colonoscopy last week (after 3 attempts, first 2 attempts with poor prep) and was found to have a mass in her colon and biopsies were taken. She was regularly getting her colonoscopies and had a normal one two years ago. She had a CT abd/pelvis done 9/2 which showed metastatic disease with metastases in the liver and lung. She was also found to have a mass in the pancreatic head concerning for a second malignancy. Since her colonoscopy last week she hasn't had a bowel movement. She was also found to have left hydroureteronephrosis on CT scan due to compression by the mass. She has noted a 6lb weight loss since last week. She has been experiencing some abdominal discomfort and noticed some decreased urinary frequency and stream. She came to the ED as Dr. Urias told her she would be a candidate for a left ureteral stent and a colonic stent. In the ED, T 98.8, HR 96, /94, RR 18, Sat 99%. She was given 1L NS and urology was consulted.  Patient s/p loop colostomy 9/14/17.

## 2017-09-15 NOTE — DISCHARGE NOTE ADULT - CARE PROVIDER_API CALL
Bj Urias), Internal Medicine; Medical Oncology  [address] [place] [address]  Phone: [phone]  Fax: [phone]

## 2017-09-15 NOTE — PROGRESS NOTE ADULT - PROBLEM SELECTOR PLAN 5
Patient has been relatively normotensive during admission, well controlled on Norvasc.  -Will continue Norvasc 5mg Qd Patient was found to be iron deficient with microcytic anemia on admission. Now s/p 5 days Ferrlecit.   -No further indication for iron at this time. Would plan to draw reticulocyte count Monday if patient still present.

## 2017-09-15 NOTE — DISCHARGE NOTE ADULT - CARE PLAN
Principal Discharge DX:	Metastatic colon cancer in female  Goal:	To alleviate symptoms due to metastases and acquire biopsy of masses  Instructions for follow-up, activity and diet:	You were admitted to the hospital due to mass compression of your left ureter and a partial large bowel obstruction. During your stay you had multiple procedures including CT guided biopsy of the liver, diverting loop colostomy, left percutaneous nephrostomy, and colonoscopies with attempted stent placement. Please continue to follow up with Dr. Urias as an outpatient.  Secondary Diagnosis:	Iron deficiency anemia, unspecified iron deficiency anemia type  Instructions for follow-up, activity and diet:	During your stay you were found to have iron deficiency anemia most likely due to bleeding 2/2 to cancer. You were treated with Ferrlecit for 5 days which should replete your iron stores. Dr. Urias will be able to monitor your anemia as an outpatient. Principal Discharge DX:	Metastatic colon cancer in female  Goal:	To alleviate symptoms due to metastases and acquire biopsy of masses  Instructions for follow-up, activity and diet:	You were admitted to the hospital due to mass compression of your left ureter and a partial large bowel obstruction. During your stay you had multiple procedures including CT guided biopsy of the liver, diverting loop colostomy, left percutaneous nephrostomy, and colonoscopies with attempted stent placement. Please continue to follow up with Dr. Urias as an outpatient on Tuesday September 19th at 12:00.  Secondary Diagnosis:	Iron deficiency anemia, unspecified iron deficiency anemia type  Instructions for follow-up, activity and diet:	During your stay you were found to have iron deficiency anemia most likely due to bleeding 2/2 to cancer. You were treated with Ferrlecit for 5 days which should replete your iron stores. Dr. Urias will be able to monitor your anemia as an outpatient. Principal Discharge DX:	Metastatic colon cancer in female  Goal:	To alleviate symptoms due to metastases and acquire biopsy of masses  Instructions for follow-up, activity and diet:	You were admitted to the hospital due to mass compression of your left ureter and a partial large bowel obstruction. During your stay you had multiple procedures including CT guided biopsy of the liver, diverting loop colostomy, left percutaneous nephrostomy, and colonoscopies with attempted stent placement. Please continue to follow up with Dr. Urias as an outpatient on Tuesday September 19th at 12:00.  Secondary Diagnosis:	Iron deficiency anemia, unspecified iron deficiency anemia type  Instructions for follow-up, activity and diet:	During your stay you were found to have iron deficiency anemia most likely due to bleeding 2/2 to cancer. You were treated with Ferrlecit for 5 days which should replete your iron stores. Dr. Urias will be able to monitor your anemia as an outpatient.  Secondary Diagnosis:	Hydronephrosis, left  Instructions for follow-up, activity and diet:	You were found to have compression of the left ureter on admission which caused fluid to back up into your kidney. A Left Nephrostomy tube was placed into your kidney to help alleviate the increased pressure. You will need to change the dressing every 3 days. Also every other day you should use a syringe to inject saline into the line. This is called "flushing" the line. Please follow up with the Interventional Radiology team here every 3 months for replacement of the tube.

## 2017-09-15 NOTE — PROGRESS NOTE ADULT - SUBJECTIVE AND OBJECTIVE BOX
Ms. Hebert is a 57 year old female with a PMH of HTN, hyperlipidemia, and colon polyp removal who came to [facility] for constipation and decreased urine output secondary to stage IV adenocarcinoma of the sigmoid colon with mets to the liver and lungs, as well as a pancreatic mass metastatic vs. second primary. She received a left percutaneous nephrostomy tube, biopsy of the liver, and a loop colostomy after failed attempts at colonic stenting.     She is post-op day 1 from loop colostomy with no acute events over night. She reported mild 3/10 pain around the colostomy area, which was managed with ketorolac. Otherwise, patient's only complaints were a sore throat, mild cough that resolved, and dry mouth. Overnight the colostomy began draining and the wafer was changed. Ms. Hebert denied SOB, fever, chills, blurry vision, muscle pain, fatigue, weakness, dysuria, hematuria, swelling.    Vital Signs Last 24 Hrs  T(C): 36.7 (15 Sep 2017 09:08), Max: 37 (14 Sep 2017 16:14)  T(F): 98.1 (15 Sep 2017 09:08), Max: 98.6 (14 Sep 2017 16:14)  HR: 70 (15 Sep 2017 09:08) (70 - 95)  BP: 109/65 (15 Sep 2017 09:08) (101/58 - 163/86)  BP(mean): 96 (14 Sep 2017 18:13) (92 - 114)  RR: 16 (15 Sep 2017 09:08) (10 - 18)  SpO2: 97% (15 Sep 2017 09:08) (95% - 100%)    Physical Exam  HEENT: no sclera icterus,   Neuro: CN II - XII grossly intact, no focal deficits,   CV: normal S1,S2, no Murmurs, no gallops, no rubs appreciated  Respiratory: lungs clear to auscultation bilaterally, no wheezes, no rales, no rhonci  GI: soft, non distended, nontender except around ostomy site, ostomy site clean & draining slightly red stool  no CVA tenderness, nephrostomy tube draining clear liquid, no tenderness around nephrostomy tube site  Vascular: pulse palpable bilaterally  Extremities: no pedal swelling or edema    Labs                      8.0    12.7  )-----------( 323      ( 15 Sep 2017 06:24 )             26.3   09-15    140  |  102  |  10  ----------------------------<  118<H>  3.9   |  20<L>  |  1.22    Ca    9.1      15 Sep 2017 06:24  Phos  3.4     09-14  Mg     1.6     09-15    TPro  6.9  /  Alb  3.1<L>  /  TBili  0.3  /  DBili  x   /  AST  13  /  ALT  9<L>  /  AlkPhos  86  09-14 Ms. Hebert is a 57 year old female with a PMH of HTN, hyperlipidemia, and colon polyp removal who came to [facility] for constipation and decreased urine output secondary to stage IV adenocarcinoma of the sigmoid colon with mets to the liver and lungs, as well as a pancreatic mass metastatic vs. second primary. She received a left percutaneous nephrostomy tube, biopsy of the liver, and a loop colostomy after failed attempts at colonic stenting.     She is post-op day 1 from loop colostomy with no acute events over night. She reported mild 3/10 pain around the colostomy area, which was managed with ketorolac. Otherwise, patient's only complaints were a sore throat, mild cough that resolved, and dry mouth. Overnight the colostomy began draining and the wafer was changed. Ms. Hebert denied SOB, fever, chills, blurry vision, muscle pain, fatigue, weakness, dysuria, hematuria, swelling. She appears to be dealing with the diagnosis and surgery well psychologically.     Vital Signs Last 24 Hrs  T(C): 36.7 (15 Sep 2017 09:08), Max: 37 (14 Sep 2017 16:14)  T(F): 98.1 (15 Sep 2017 09:08), Max: 98.6 (14 Sep 2017 16:14)  HR: 70 (15 Sep 2017 09:08) (70 - 95)  BP: 109/65 (15 Sep 2017 09:08) (101/58 - 163/86)  BP(mean): 96 (14 Sep 2017 18:13) (92 - 114)  RR: 16 (15 Sep 2017 09:08) (10 - 18)  SpO2: 97% (15 Sep 2017 09:08) (95% - 100%)    Physical Exam  Constitutional: No acute distress,   HEENT: no sclera icterus,   Neuro: CN II - XII grossly intact, no focal deficits,   CV: normal S1,S2, no Murmurs, no gallops, no rubs appreciated  Respiratory: lungs clear to auscultation bilaterally, no wheezes, no rales, no rhonci  GI: soft, non distended, nontender except around ostomy site, ostomy site clean & draining slightly red stool  no CVA tenderness, nephrostomy tube draining clear liquid, no tenderness around nephrostomy tube site  Vascular: pulse palpable bilaterally  Extremities: no pedal swelling or edema    Labs                      8.0    12.7  )-----------( 323      ( 15 Sep 2017 06:24 )             26.3   09-15    140  |  102  |  10  ----------------------------<  118<H>  3.9   |  20<L>  |  1.22    Ca    9.1      15 Sep 2017 06:24  Phos  3.4     09-14  Mg     1.6     09-15    TPro  6.9  /  Alb  3.1<L>  /  TBili  0.3  /  DBili  x   /  AST  13  /  ALT  9<L>  /  AlkPhos  86  09-14

## 2017-09-15 NOTE — PROGRESS NOTE ADULT - PROVIDER SPECIALTY LIST ADULT
Colorectal Surgery
Gastroenterology
Heme/Onc
Internal Medicine
Surgery
Urology
Internal Medicine
Heme/Onc

## 2017-09-15 NOTE — PROGRESS NOTE ADULT - SUBJECTIVE AND OBJECTIVE BOX
Heme/Onc Progress Note (Dr. Urias)    Interval History: Patient stable today initial had mild tenderness around ostomy site, tolerating diet which has been advanced. Otherwise patient denies fevers, chills, chest pain, shortness of breath, or any bleeding/bruising. Patient passing gas.     Allergies    No Known Allergies    Intolerances        Medications:  MEDICATIONS  (STANDING):  sodium chloride 0.9%. 1000 milliLiter(s) (100 mL/Hr) IV Continuous <Continuous>  heparin  Injectable 5000 Unit(s) SubCutaneous every 8 hours  amLODIPine   Tablet 5 milliGRAM(s) Oral daily  simvastatin 40 milliGRAM(s) Oral at bedtime    MEDICATIONS  (PRN):    heparin  Injectable 5000 Unit(s) SubCutaneous every 8 hours          PHYSICAL EXAM:    T(F): 98.1 (09-15-17 @ 09:08), Max: 98.6 (09-14-17 @ 16:14)  HR: 70 (09-15-17 @ 09:08) (70 - 95)  BP: 109/65 (09-15-17 @ 09:08) (101/58 - 163/86)  RR: 16 (09-15-17 @ 09:08) (10 - 18)  SpO2: 97% (09-15-17 @ 09:08) (95% - 100%)  Wt(kg): --    General: A&Ox3; NAD, morbidly obese  Head: NC/AT; MMM; PERRL; EOMI;  Neck: Supple; no JVD  Respiratory: CTA B/L; no wheezes/crackles   Cardiovascular: Regular rhythm/rate; S1/S2, ro rubs, murmurs, or gallops  Back: Presence of Left Nephrostomy tube, dressing is dry. Draining clear fluid  Gastrointestinal: ostomy site clean, draining liquid, Soft, normoactive BS  Extremities: lower extremity edema bilaterally  Neurological:  CNII-XII grossly intact; no obvious focal deficits      Labs:                          8.0    12.7  )-----------( 323      ( 15 Sep 2017 06:24 )             26.3     CBC Full  -  ( 15 Sep 2017 06:24 )  WBC Count : 12.7 K/uL  Hemoglobin : 8.0 g/dL  Hematocrit : 26.3 %  Platelet Count - Automated : 323 K/uL  Mean Cell Volume : 70.9 fL  Mean Cell Hemoglobin : 21.6 pg  Mean Cell Hemoglobin Concentration : 30.4 g/dL  Auto Neutrophil # : x  Auto Lymphocyte # : x  Auto Monocyte # : x  Auto Eosinophil # : x  Auto Basophil # : x  Auto Neutrophil % : x  Auto Lymphocyte % : x  Auto Monocyte % : x  Auto Eosinophil % : x  Auto Basophil % : x    PT/INR - ( 14 Sep 2017 06:56 )   PT: 14.7 sec;   INR: 1.32          PTT - ( 14 Sep 2017 06:56 )  PTT:35.6 sec    09-15    140  |  102  |  10  ----------------------------<  118<H>  3.9   |  20<L>  |  1.22    Ca    9.1      15 Sep 2017 06:24  Phos  3.4     09-14  Mg     1.6     09-15    TPro  6.9  /  Alb  3.1<L>  /  TBili  0.3  /  DBili  x   /  AST  13  /  ALT  9<L>  /  AlkPhos  86  09-14          surgical pathology - Liver; core biopsy:  - Metastatic adenocarcinoma, consistent with colorectal  primary.    Note:  Immunohistochemical stains show tumor cells to be positive for  CDX-2 and CK20 and negative for CK7.

## 2017-09-16 VITALS
DIASTOLIC BLOOD PRESSURE: 65 MMHG | RESPIRATION RATE: 15 BRPM | OXYGEN SATURATION: 98 % | SYSTOLIC BLOOD PRESSURE: 102 MMHG | TEMPERATURE: 98 F | HEART RATE: 61 BPM

## 2017-09-16 PROCEDURE — 71045 X-RAY EXAM CHEST 1 VIEW: CPT

## 2017-09-16 PROCEDURE — 83690 ASSAY OF LIPASE: CPT

## 2017-09-16 PROCEDURE — C1769: CPT

## 2017-09-16 PROCEDURE — 86850 RBC ANTIBODY SCREEN: CPT

## 2017-09-16 PROCEDURE — 36415 COLL VENOUS BLD VENIPUNCTURE: CPT

## 2017-09-16 PROCEDURE — 87086 URINE CULTURE/COLONY COUNT: CPT

## 2017-09-16 PROCEDURE — 88307 TISSUE EXAM BY PATHOLOGIST: CPT

## 2017-09-16 PROCEDURE — 78815 PET IMAGE W/CT SKULL-THIGH: CPT

## 2017-09-16 PROCEDURE — 93970 EXTREMITY STUDY: CPT

## 2017-09-16 PROCEDURE — 81003 URINALYSIS AUTO W/O SCOPE: CPT

## 2017-09-16 PROCEDURE — 85025 COMPLETE CBC W/AUTO DIFF WBC: CPT

## 2017-09-16 PROCEDURE — 47000 NEEDLE BIOPSY OF LIVER PERQ: CPT

## 2017-09-16 PROCEDURE — A9552: CPT

## 2017-09-16 PROCEDURE — 84100 ASSAY OF PHOSPHORUS: CPT

## 2017-09-16 PROCEDURE — 85610 PROTHROMBIN TIME: CPT

## 2017-09-16 PROCEDURE — 88305 TISSUE EXAM BY PATHOLOGIST: CPT

## 2017-09-16 PROCEDURE — 82728 ASSAY OF FERRITIN: CPT

## 2017-09-16 PROCEDURE — 82378 CARCINOEMBRYONIC ANTIGEN: CPT

## 2017-09-16 PROCEDURE — 50432 PLMT NEPHROSTOMY CATHETER: CPT

## 2017-09-16 PROCEDURE — 83735 ASSAY OF MAGNESIUM: CPT

## 2017-09-16 PROCEDURE — 99285 EMERGENCY DEPT VISIT HI MDM: CPT | Mod: 25

## 2017-09-16 PROCEDURE — 88341 IMHCHEM/IMCYTCHM EA ADD ANTB: CPT

## 2017-09-16 PROCEDURE — 85027 COMPLETE CBC AUTOMATED: CPT

## 2017-09-16 PROCEDURE — 80053 COMPREHEN METABOLIC PANEL: CPT

## 2017-09-16 PROCEDURE — 93005 ELECTROCARDIOGRAM TRACING: CPT

## 2017-09-16 PROCEDURE — 82105 ALPHA-FETOPROTEIN SERUM: CPT

## 2017-09-16 PROCEDURE — 86900 BLOOD TYPING SEROLOGIC ABO: CPT

## 2017-09-16 PROCEDURE — 86901 BLOOD TYPING SEROLOGIC RH(D): CPT

## 2017-09-16 PROCEDURE — 86301 IMMUNOASSAY TUMOR CA 19-9: CPT

## 2017-09-16 PROCEDURE — 85730 THROMBOPLASTIN TIME PARTIAL: CPT

## 2017-09-16 PROCEDURE — C1729: CPT

## 2017-09-16 PROCEDURE — 76942 ECHO GUIDE FOR BIOPSY: CPT

## 2017-09-16 PROCEDURE — C1894: CPT

## 2017-09-16 PROCEDURE — 88173 CYTOPATH EVAL FNA REPORT: CPT

## 2017-09-16 PROCEDURE — 80048 BASIC METABOLIC PNL TOTAL CA: CPT

## 2017-09-16 PROCEDURE — 83550 IRON BINDING TEST: CPT

## 2017-09-16 PROCEDURE — 88342 IMHCHEM/IMCYTCHM 1ST ANTB: CPT

## 2017-09-16 RX ADMIN — AMLODIPINE BESYLATE 5 MILLIGRAM(S): 2.5 TABLET ORAL at 07:15

## 2017-09-16 RX ADMIN — HEPARIN SODIUM 5000 UNIT(S): 5000 INJECTION INTRAVENOUS; SUBCUTANEOUS at 07:15

## 2017-09-19 DIAGNOSIS — E78.00 PURE HYPERCHOLESTEROLEMIA, UNSPECIFIED: ICD-10-CM

## 2017-09-19 DIAGNOSIS — C78.02 SECONDARY MALIGNANT NEOPLASM OF LEFT LUNG: ICD-10-CM

## 2017-09-19 DIAGNOSIS — E86.0 DEHYDRATION: ICD-10-CM

## 2017-09-19 DIAGNOSIS — E66.9 OBESITY, UNSPECIFIED: ICD-10-CM

## 2017-09-19 DIAGNOSIS — I10 ESSENTIAL (PRIMARY) HYPERTENSION: ICD-10-CM

## 2017-09-19 DIAGNOSIS — C18.7 MALIGNANT NEOPLASM OF SIGMOID COLON: ICD-10-CM

## 2017-09-19 DIAGNOSIS — M89.9 DISORDER OF BONE, UNSPECIFIED: ICD-10-CM

## 2017-09-19 DIAGNOSIS — C25.1 MALIGNANT NEOPLASM OF BODY OF PANCREAS: ICD-10-CM

## 2017-09-19 DIAGNOSIS — C78.01 SECONDARY MALIGNANT NEOPLASM OF RIGHT LUNG: ICD-10-CM

## 2017-09-19 DIAGNOSIS — D63.0 ANEMIA IN NEOPLASTIC DISEASE: ICD-10-CM

## 2017-09-19 DIAGNOSIS — D47.3 ESSENTIAL (HEMORRHAGIC) THROMBOCYTHEMIA: ICD-10-CM

## 2017-09-19 DIAGNOSIS — N13.1 HYDRONEPHROSIS WITH URETERAL STRICTURE, NOT ELSEWHERE CLASSIFIED: ICD-10-CM

## 2017-09-19 DIAGNOSIS — K56.69 OTHER INTESTINAL OBSTRUCTION: ICD-10-CM

## 2017-09-19 DIAGNOSIS — E87.2 ACIDOSIS: ICD-10-CM

## 2017-09-19 DIAGNOSIS — E43 UNSPECIFIED SEVERE PROTEIN-CALORIE MALNUTRITION: ICD-10-CM

## 2017-09-19 DIAGNOSIS — C78.7 SECONDARY MALIGNANT NEOPLASM OF LIVER AND INTRAHEPATIC BILE DUCT: ICD-10-CM

## 2017-09-19 DIAGNOSIS — N17.9 ACUTE KIDNEY FAILURE, UNSPECIFIED: ICD-10-CM

## 2017-09-19 DIAGNOSIS — K59.09 OTHER CONSTIPATION: ICD-10-CM

## 2017-09-27 PROBLEM — C18.9 MALIGNANT NEOPLASM OF COLON, UNSPECIFIED: Chronic | Status: ACTIVE | Noted: 2017-09-07

## 2017-09-27 PROBLEM — E78.00 PURE HYPERCHOLESTEROLEMIA, UNSPECIFIED: Chronic | Status: ACTIVE | Noted: 2017-09-07

## 2017-09-27 PROBLEM — I10 ESSENTIAL (PRIMARY) HYPERTENSION: Chronic | Status: ACTIVE | Noted: 2017-09-07

## 2017-10-17 ENCOUNTER — APPOINTMENT (OUTPATIENT)
Dept: GASTROENTEROLOGY | Facility: HOSPITAL | Age: 57
End: 2017-10-17

## 2017-10-17 ENCOUNTER — RESULT REVIEW (OUTPATIENT)
Age: 57
End: 2017-10-17

## 2017-10-17 ENCOUNTER — OUTPATIENT (OUTPATIENT)
Dept: OUTPATIENT SERVICES | Facility: HOSPITAL | Age: 57
LOS: 1 days | Discharge: HOME | End: 2017-10-17
Payer: COMMERCIAL

## 2017-10-17 DIAGNOSIS — Z98.891 HISTORY OF UTERINE SCAR FROM PREVIOUS SURGERY: Chronic | ICD-10-CM

## 2017-10-17 PROCEDURE — 88305 TISSUE EXAM BY PATHOLOGIST: CPT

## 2017-10-17 PROCEDURE — 43242 EGD US FINE NEEDLE BX/ASPIR: CPT

## 2017-10-17 PROCEDURE — 88341 IMHCHEM/IMCYTCHM EA ADD ANTB: CPT

## 2017-10-17 PROCEDURE — 88173 CYTOPATH EVAL FNA REPORT: CPT

## 2017-10-17 PROCEDURE — 88342 IMHCHEM/IMCYTCHM 1ST ANTB: CPT

## 2017-10-19 LAB — NON-GYNECOLOGICAL CYTOLOGY STUDY: SIGNIFICANT CHANGE UP

## 2017-10-23 DIAGNOSIS — C25.1 MALIGNANT NEOPLASM OF BODY OF PANCREAS: ICD-10-CM

## 2017-11-03 ENCOUNTER — OUTPATIENT (OUTPATIENT)
Dept: OUTPATIENT SERVICES | Facility: HOSPITAL | Age: 57
LOS: 1 days | End: 2017-11-03
Payer: COMMERCIAL

## 2017-11-03 DIAGNOSIS — C18.9 MALIGNANT NEOPLASM OF COLON, UNSPECIFIED: ICD-10-CM

## 2017-11-03 DIAGNOSIS — Z45.2 ENCOUNTER FOR ADJUSTMENT AND MANAGEMENT OF VASCULAR ACCESS DEVICE: ICD-10-CM

## 2017-11-03 DIAGNOSIS — Z98.891 HISTORY OF UTERINE SCAR FROM PREVIOUS SURGERY: Chronic | ICD-10-CM

## 2017-11-03 PROCEDURE — 36561 INSERT TUNNELED CV CATH: CPT

## 2017-11-03 PROCEDURE — 76937 US GUIDE VASCULAR ACCESS: CPT

## 2017-11-03 PROCEDURE — 99152 MOD SED SAME PHYS/QHP 5/>YRS: CPT

## 2017-11-03 PROCEDURE — C1788: CPT

## 2017-11-03 PROCEDURE — 99153 MOD SED SAME PHYS/QHP EA: CPT

## 2017-11-03 PROCEDURE — C1894: CPT

## 2017-11-03 PROCEDURE — 76937 US GUIDE VASCULAR ACCESS: CPT | Mod: 26

## 2017-11-03 PROCEDURE — C1769: CPT

## 2017-11-03 PROCEDURE — 77001 FLUOROGUIDE FOR VEIN DEVICE: CPT | Mod: 26

## 2017-11-03 PROCEDURE — 77001 FLUOROGUIDE FOR VEIN DEVICE: CPT

## 2017-11-07 RX ORDER — BEVACIZUMAB 400 MG/16ML
627 INJECTION, SOLUTION INTRAVENOUS ONCE
Qty: 0 | Refills: 0 | Status: DISCONTINUED | OUTPATIENT
Start: 2017-11-09 | End: 2017-11-24

## 2017-11-07 RX ORDER — ONDANSETRON 8 MG/1
16 TABLET, FILM COATED ORAL ONCE
Qty: 0 | Refills: 0 | Status: DISCONTINUED | OUTPATIENT
Start: 2017-11-09 | End: 2017-11-24

## 2017-11-07 RX ORDER — DEXAMETHASONE 0.5 MG/5ML
20 ELIXIR ORAL ONCE
Qty: 0 | Refills: 0 | Status: DISCONTINUED | OUTPATIENT
Start: 2017-11-09 | End: 2017-11-24

## 2017-11-07 RX ORDER — OXALIPLATIN 5 MG/ML
245 INJECTION, SOLUTION INTRAVENOUS ONCE
Qty: 0 | Refills: 0 | Status: DISCONTINUED | OUTPATIENT
Start: 2017-11-09 | End: 2017-11-24

## 2017-11-09 ENCOUNTER — OUTPATIENT (OUTPATIENT)
Dept: OUTPATIENT SERVICES | Facility: HOSPITAL | Age: 57
LOS: 1 days | End: 2017-11-09
Payer: COMMERCIAL

## 2017-11-09 ENCOUNTER — APPOINTMENT (OUTPATIENT)
Dept: INFUSION THERAPY | Facility: HOSPITAL | Age: 57
End: 2017-11-09

## 2017-11-09 DIAGNOSIS — C18.9 MALIGNANT NEOPLASM OF COLON, UNSPECIFIED: ICD-10-CM

## 2017-11-09 DIAGNOSIS — Z98.891 HISTORY OF UTERINE SCAR FROM PREVIOUS SURGERY: Chronic | ICD-10-CM

## 2017-11-09 PROCEDURE — 96415 CHEMO IV INFUSION ADDL HR: CPT

## 2017-11-09 PROCEDURE — 96413 CHEMO IV INFUSION 1 HR: CPT

## 2017-11-09 PROCEDURE — 96367 TX/PROPH/DG ADDL SEQ IV INF: CPT

## 2017-11-09 PROCEDURE — 96417 CHEMO IV INFUS EACH ADDL SEQ: CPT

## 2017-11-09 PROCEDURE — 96375 TX/PRO/DX INJ NEW DRUG ADDON: CPT

## 2017-11-13 DIAGNOSIS — R11.2 NAUSEA WITH VOMITING, UNSPECIFIED: ICD-10-CM

## 2017-11-30 RX ORDER — ONDANSETRON 8 MG/1
16 TABLET, FILM COATED ORAL ONCE
Qty: 0 | Refills: 0 | Status: DISCONTINUED | OUTPATIENT
Start: 2017-12-01 | End: 2017-12-16

## 2017-11-30 RX ORDER — OXALIPLATIN 5 MG/ML
245 INJECTION, SOLUTION INTRAVENOUS ONCE
Qty: 0 | Refills: 0 | Status: DISCONTINUED | OUTPATIENT
Start: 2017-12-01 | End: 2017-12-16

## 2017-11-30 RX ORDER — BEVACIZUMAB 400 MG/16ML
627 INJECTION, SOLUTION INTRAVENOUS ONCE
Qty: 0 | Refills: 0 | Status: DISCONTINUED | OUTPATIENT
Start: 2017-12-01 | End: 2017-12-16

## 2017-11-30 RX ORDER — DEXAMETHASONE 0.5 MG/5ML
20 ELIXIR ORAL ONCE
Qty: 0 | Refills: 0 | Status: DISCONTINUED | OUTPATIENT
Start: 2017-12-01 | End: 2017-12-16

## 2017-12-01 ENCOUNTER — APPOINTMENT (OUTPATIENT)
Dept: INFUSION THERAPY | Facility: HOSPITAL | Age: 57
End: 2017-12-01

## 2017-12-01 ENCOUNTER — OUTPATIENT (OUTPATIENT)
Dept: OUTPATIENT SERVICES | Facility: HOSPITAL | Age: 57
LOS: 1 days | End: 2017-12-01
Payer: COMMERCIAL

## 2017-12-01 DIAGNOSIS — Z98.891 HISTORY OF UTERINE SCAR FROM PREVIOUS SURGERY: Chronic | ICD-10-CM

## 2017-12-01 DIAGNOSIS — C18.9 MALIGNANT NEOPLASM OF COLON, UNSPECIFIED: ICD-10-CM

## 2017-12-01 PROCEDURE — 96375 TX/PRO/DX INJ NEW DRUG ADDON: CPT

## 2017-12-01 PROCEDURE — 96413 CHEMO IV INFUSION 1 HR: CPT

## 2017-12-01 PROCEDURE — 96417 CHEMO IV INFUS EACH ADDL SEQ: CPT

## 2017-12-01 PROCEDURE — 96415 CHEMO IV INFUSION ADDL HR: CPT

## 2017-12-06 DIAGNOSIS — R11.2 NAUSEA WITH VOMITING, UNSPECIFIED: ICD-10-CM

## 2017-12-08 ENCOUNTER — OUTPATIENT (OUTPATIENT)
Dept: OUTPATIENT SERVICES | Facility: HOSPITAL | Age: 57
LOS: 1 days | End: 2017-12-08
Payer: COMMERCIAL

## 2017-12-08 DIAGNOSIS — Z98.891 HISTORY OF UTERINE SCAR FROM PREVIOUS SURGERY: Chronic | ICD-10-CM

## 2017-12-08 PROCEDURE — C1769: CPT

## 2017-12-08 PROCEDURE — C1729: CPT

## 2017-12-08 PROCEDURE — 50435 EXCHANGE NEPHROSTOMY CATH: CPT

## 2017-12-08 PROCEDURE — 50435 EXCHANGE NEPHROSTOMY CATH: CPT | Mod: LT

## 2017-12-18 DIAGNOSIS — C18.9 MALIGNANT NEOPLASM OF COLON, UNSPECIFIED: ICD-10-CM

## 2017-12-18 DIAGNOSIS — N13.30 UNSPECIFIED HYDRONEPHROSIS: ICD-10-CM

## 2017-12-18 DIAGNOSIS — Z43.6 ENCOUNTER FOR ATTENTION TO OTHER ARTIFICIAL OPENINGS OF URINARY TRACT: ICD-10-CM

## 2017-12-27 RX ORDER — BEVACIZUMAB 400 MG/16ML
586 INJECTION, SOLUTION INTRAVENOUS ONCE
Qty: 0 | Refills: 0 | Status: DISCONTINUED | OUTPATIENT
Start: 2017-12-28 | End: 2018-01-12

## 2017-12-27 RX ORDER — OXALIPLATIN 5 MG/ML
237 INJECTION, SOLUTION INTRAVENOUS ONCE
Qty: 0 | Refills: 0 | Status: DISCONTINUED | OUTPATIENT
Start: 2017-12-28 | End: 2018-01-12

## 2017-12-27 RX ORDER — DEXAMETHASONE 0.5 MG/5ML
20 ELIXIR ORAL ONCE
Qty: 0 | Refills: 0 | Status: DISCONTINUED | OUTPATIENT
Start: 2017-12-28 | End: 2018-01-12

## 2017-12-27 RX ORDER — ONDANSETRON 8 MG/1
16 TABLET, FILM COATED ORAL ONCE
Qty: 0 | Refills: 0 | Status: DISCONTINUED | OUTPATIENT
Start: 2017-12-28 | End: 2018-01-12

## 2017-12-28 ENCOUNTER — OUTPATIENT (OUTPATIENT)
Dept: OUTPATIENT SERVICES | Facility: HOSPITAL | Age: 57
LOS: 1 days | End: 2017-12-28
Payer: COMMERCIAL

## 2017-12-28 ENCOUNTER — APPOINTMENT (OUTPATIENT)
Dept: INFUSION THERAPY | Facility: HOSPITAL | Age: 57
End: 2017-12-28

## 2017-12-28 DIAGNOSIS — Z98.891 HISTORY OF UTERINE SCAR FROM PREVIOUS SURGERY: Chronic | ICD-10-CM

## 2017-12-28 DIAGNOSIS — C18.9 MALIGNANT NEOPLASM OF COLON, UNSPECIFIED: ICD-10-CM

## 2017-12-28 PROCEDURE — 96413 CHEMO IV INFUSION 1 HR: CPT

## 2017-12-28 PROCEDURE — 96375 TX/PRO/DX INJ NEW DRUG ADDON: CPT

## 2017-12-28 PROCEDURE — 96417 CHEMO IV INFUS EACH ADDL SEQ: CPT

## 2017-12-28 PROCEDURE — 96415 CHEMO IV INFUSION ADDL HR: CPT

## 2018-01-05 DIAGNOSIS — R11.2 NAUSEA WITH VOMITING, UNSPECIFIED: ICD-10-CM

## 2018-01-17 RX ORDER — DEXAMETHASONE 0.5 MG/5ML
20 ELIXIR ORAL ONCE
Qty: 0 | Refills: 0 | Status: DISCONTINUED | OUTPATIENT
Start: 2018-01-18 | End: 2018-02-02

## 2018-01-17 RX ORDER — OXALIPLATIN 5 MG/ML
238 INJECTION, SOLUTION INTRAVENOUS ONCE
Qty: 0 | Refills: 0 | Status: DISCONTINUED | OUTPATIENT
Start: 2018-01-18 | End: 2018-02-02

## 2018-01-17 RX ORDER — ONDANSETRON 8 MG/1
16 TABLET, FILM COATED ORAL ONCE
Qty: 0 | Refills: 0 | Status: DISCONTINUED | OUTPATIENT
Start: 2018-01-18 | End: 2018-02-02

## 2018-01-17 RX ORDER — BEVACIZUMAB 400 MG/16ML
586 INJECTION, SOLUTION INTRAVENOUS ONCE
Qty: 0 | Refills: 0 | Status: DISCONTINUED | OUTPATIENT
Start: 2018-01-18 | End: 2018-02-02

## 2018-01-18 ENCOUNTER — OUTPATIENT (OUTPATIENT)
Dept: OUTPATIENT SERVICES | Facility: HOSPITAL | Age: 58
LOS: 1 days | End: 2018-01-18
Payer: COMMERCIAL

## 2018-01-18 ENCOUNTER — APPOINTMENT (OUTPATIENT)
Dept: INFUSION THERAPY | Facility: HOSPITAL | Age: 58
End: 2018-01-18

## 2018-01-18 DIAGNOSIS — Z98.891 HISTORY OF UTERINE SCAR FROM PREVIOUS SURGERY: Chronic | ICD-10-CM

## 2018-01-18 DIAGNOSIS — C18.9 MALIGNANT NEOPLASM OF COLON, UNSPECIFIED: ICD-10-CM

## 2018-01-18 PROCEDURE — 96375 TX/PRO/DX INJ NEW DRUG ADDON: CPT

## 2018-01-18 PROCEDURE — 96413 CHEMO IV INFUSION 1 HR: CPT

## 2018-01-18 PROCEDURE — 96415 CHEMO IV INFUSION ADDL HR: CPT

## 2018-01-18 PROCEDURE — 96417 CHEMO IV INFUS EACH ADDL SEQ: CPT

## 2018-01-25 DIAGNOSIS — R11.2 NAUSEA WITH VOMITING, UNSPECIFIED: ICD-10-CM

## 2018-01-29 ENCOUNTER — OUTPATIENT (OUTPATIENT)
Dept: OUTPATIENT SERVICES | Facility: HOSPITAL | Age: 58
LOS: 1 days | End: 2018-01-29
Payer: COMMERCIAL

## 2018-01-29 DIAGNOSIS — Z98.891 HISTORY OF UTERINE SCAR FROM PREVIOUS SURGERY: Chronic | ICD-10-CM

## 2018-01-29 LAB — GLUCOSE BLDC GLUCOMTR-MCNC: 109 MG/DL — HIGH (ref 70–99)

## 2018-01-29 PROCEDURE — 78815 PET IMAGE W/CT SKULL-THIGH: CPT

## 2018-01-29 PROCEDURE — A9552: CPT

## 2018-01-29 PROCEDURE — 78815 PET IMAGE W/CT SKULL-THIGH: CPT | Mod: 26

## 2018-01-29 PROCEDURE — 82962 GLUCOSE BLOOD TEST: CPT

## 2018-02-07 RX ORDER — OXALIPLATIN 5 MG/ML
236 INJECTION, SOLUTION INTRAVENOUS ONCE
Qty: 0 | Refills: 0 | Status: DISCONTINUED | OUTPATIENT
Start: 2018-02-08 | End: 2018-02-23

## 2018-02-07 RX ORDER — DEXAMETHASONE 0.5 MG/5ML
20 ELIXIR ORAL ONCE
Qty: 0 | Refills: 0 | Status: DISCONTINUED | OUTPATIENT
Start: 2018-02-08 | End: 2018-02-23

## 2018-02-07 RX ORDER — BEVACIZUMAB 400 MG/16ML
579 INJECTION, SOLUTION INTRAVENOUS ONCE
Qty: 0 | Refills: 0 | Status: DISCONTINUED | OUTPATIENT
Start: 2018-02-08 | End: 2018-02-23

## 2018-02-07 RX ORDER — ONDANSETRON 8 MG/1
16 TABLET, FILM COATED ORAL ONCE
Qty: 0 | Refills: 0 | Status: DISCONTINUED | OUTPATIENT
Start: 2018-02-08 | End: 2018-02-23

## 2018-02-08 ENCOUNTER — OUTPATIENT (OUTPATIENT)
Dept: OUTPATIENT SERVICES | Facility: HOSPITAL | Age: 58
LOS: 1 days | End: 2018-02-08
Payer: COMMERCIAL

## 2018-02-08 DIAGNOSIS — Z98.891 HISTORY OF UTERINE SCAR FROM PREVIOUS SURGERY: Chronic | ICD-10-CM

## 2018-02-08 DIAGNOSIS — C18.9 MALIGNANT NEOPLASM OF COLON, UNSPECIFIED: ICD-10-CM

## 2018-02-08 PROCEDURE — 96413 CHEMO IV INFUSION 1 HR: CPT

## 2018-02-08 PROCEDURE — 96415 CHEMO IV INFUSION ADDL HR: CPT

## 2018-02-08 PROCEDURE — 96375 TX/PRO/DX INJ NEW DRUG ADDON: CPT

## 2018-02-08 PROCEDURE — 96417 CHEMO IV INFUS EACH ADDL SEQ: CPT

## 2018-02-15 ENCOUNTER — APPOINTMENT (OUTPATIENT)
Dept: INFUSION THERAPY | Facility: HOSPITAL | Age: 58
End: 2018-02-15

## 2018-02-15 DIAGNOSIS — R11.2 NAUSEA WITH VOMITING, UNSPECIFIED: ICD-10-CM

## 2018-02-28 RX ORDER — DEXAMETHASONE 0.5 MG/5ML
20 ELIXIR ORAL ONCE
Qty: 0 | Refills: 0 | Status: DISCONTINUED | OUTPATIENT
Start: 2018-03-01 | End: 2018-03-16

## 2018-02-28 RX ORDER — ONDANSETRON 8 MG/1
16 TABLET, FILM COATED ORAL ONCE
Qty: 0 | Refills: 0 | Status: DISCONTINUED | OUTPATIENT
Start: 2018-03-01 | End: 2018-03-16

## 2018-02-28 RX ORDER — OXALIPLATIN 5 MG/ML
230 INJECTION, SOLUTION INTRAVENOUS ONCE
Qty: 0 | Refills: 0 | Status: DISCONTINUED | OUTPATIENT
Start: 2018-03-01 | End: 2018-03-16

## 2018-02-28 RX ORDER — BEVACIZUMAB 400 MG/16ML
548 INJECTION, SOLUTION INTRAVENOUS ONCE
Qty: 0 | Refills: 0 | Status: DISCONTINUED | OUTPATIENT
Start: 2018-03-01 | End: 2018-03-16

## 2018-03-01 ENCOUNTER — APPOINTMENT (OUTPATIENT)
Dept: INFUSION THERAPY | Facility: HOSPITAL | Age: 58
End: 2018-03-01

## 2018-03-01 ENCOUNTER — OUTPATIENT (OUTPATIENT)
Dept: OUTPATIENT SERVICES | Facility: HOSPITAL | Age: 58
LOS: 1 days | End: 2018-03-01
Payer: COMMERCIAL

## 2018-03-01 DIAGNOSIS — Z98.891 HISTORY OF UTERINE SCAR FROM PREVIOUS SURGERY: Chronic | ICD-10-CM

## 2018-03-01 DIAGNOSIS — C18.9 MALIGNANT NEOPLASM OF COLON, UNSPECIFIED: ICD-10-CM

## 2018-03-01 PROCEDURE — 96417 CHEMO IV INFUS EACH ADDL SEQ: CPT

## 2018-03-01 PROCEDURE — 96413 CHEMO IV INFUSION 1 HR: CPT

## 2018-03-01 PROCEDURE — 96415 CHEMO IV INFUSION ADDL HR: CPT

## 2018-03-01 PROCEDURE — 96375 TX/PRO/DX INJ NEW DRUG ADDON: CPT

## 2018-03-06 DIAGNOSIS — R11.2 NAUSEA WITH VOMITING, UNSPECIFIED: ICD-10-CM

## 2018-03-08 ENCOUNTER — OUTPATIENT (OUTPATIENT)
Dept: OUTPATIENT SERVICES | Facility: HOSPITAL | Age: 58
LOS: 1 days | End: 2018-03-08
Payer: COMMERCIAL

## 2018-03-08 ENCOUNTER — APPOINTMENT (OUTPATIENT)
Dept: INTERVENTIONAL RADIOLOGY/VASCULAR | Facility: HOSPITAL | Age: 58
End: 2018-03-08

## 2018-03-08 DIAGNOSIS — Z98.891 HISTORY OF UTERINE SCAR FROM PREVIOUS SURGERY: Chronic | ICD-10-CM

## 2018-03-08 PROCEDURE — C1769: CPT

## 2018-03-08 PROCEDURE — C1729: CPT

## 2018-03-08 PROCEDURE — 50435 EXCHANGE NEPHROSTOMY CATH: CPT | Mod: LT

## 2018-03-08 PROCEDURE — 50435 EXCHANGE NEPHROSTOMY CATH: CPT

## 2018-03-22 DIAGNOSIS — N13.5 CROSSING VESSEL AND STRICTURE OF URETER WITHOUT HYDRONEPHROSIS: ICD-10-CM

## 2018-03-22 DIAGNOSIS — Z43.6 ENCOUNTER FOR ATTENTION TO OTHER ARTIFICIAL OPENINGS OF URINARY TRACT: ICD-10-CM

## 2018-03-22 RX ORDER — DEXAMETHASONE 0.5 MG/5ML
20 ELIXIR ORAL ONCE
Qty: 0 | Refills: 0 | Status: DISCONTINUED | OUTPATIENT
Start: 2018-04-06 | End: 2018-04-21

## 2018-03-22 RX ORDER — ONDANSETRON 8 MG/1
16 TABLET, FILM COATED ORAL ONCE
Qty: 0 | Refills: 0 | Status: DISCONTINUED | OUTPATIENT
Start: 2018-04-06 | End: 2018-04-21

## 2018-04-05 RX ORDER — BEVACIZUMAB 400 MG/16ML
579 INJECTION, SOLUTION INTRAVENOUS ONCE
Qty: 0 | Refills: 0 | Status: DISCONTINUED | OUTPATIENT
Start: 2018-04-06 | End: 2018-04-21

## 2018-04-05 RX ORDER — OXALIPLATIN 5 MG/ML
236 INJECTION, SOLUTION INTRAVENOUS ONCE
Qty: 0 | Refills: 0 | Status: DISCONTINUED | OUTPATIENT
Start: 2018-04-06 | End: 2018-04-21

## 2018-04-06 ENCOUNTER — OUTPATIENT (OUTPATIENT)
Dept: OUTPATIENT SERVICES | Facility: HOSPITAL | Age: 58
LOS: 1 days | End: 2018-04-06
Payer: COMMERCIAL

## 2018-04-06 ENCOUNTER — APPOINTMENT (OUTPATIENT)
Dept: INFUSION THERAPY | Facility: HOSPITAL | Age: 58
End: 2018-04-06

## 2018-04-06 DIAGNOSIS — C18.9 MALIGNANT NEOPLASM OF COLON, UNSPECIFIED: ICD-10-CM

## 2018-04-06 DIAGNOSIS — Z98.891 HISTORY OF UTERINE SCAR FROM PREVIOUS SURGERY: Chronic | ICD-10-CM

## 2018-04-06 PROCEDURE — 96413 CHEMO IV INFUSION 1 HR: CPT

## 2018-04-06 PROCEDURE — 96367 TX/PROPH/DG ADDL SEQ IV INF: CPT

## 2018-04-06 PROCEDURE — 96415 CHEMO IV INFUSION ADDL HR: CPT

## 2018-04-06 PROCEDURE — 96417 CHEMO IV INFUS EACH ADDL SEQ: CPT

## 2018-04-16 DIAGNOSIS — R11.2 NAUSEA WITH VOMITING, UNSPECIFIED: ICD-10-CM

## 2018-04-26 ENCOUNTER — APPOINTMENT (OUTPATIENT)
Dept: INFUSION THERAPY | Facility: HOSPITAL | Age: 58
End: 2018-04-26

## 2018-04-26 ENCOUNTER — OUTPATIENT (OUTPATIENT)
Dept: OUTPATIENT SERVICES | Facility: HOSPITAL | Age: 58
LOS: 1 days | End: 2018-04-26
Payer: COMMERCIAL

## 2018-04-26 DIAGNOSIS — Z98.891 HISTORY OF UTERINE SCAR FROM PREVIOUS SURGERY: Chronic | ICD-10-CM

## 2018-04-26 DIAGNOSIS — C18.9 MALIGNANT NEOPLASM OF COLON, UNSPECIFIED: ICD-10-CM

## 2018-04-26 PROCEDURE — 96413 CHEMO IV INFUSION 1 HR: CPT

## 2018-04-26 PROCEDURE — 96417 CHEMO IV INFUS EACH ADDL SEQ: CPT

## 2018-04-26 PROCEDURE — 96415 CHEMO IV INFUSION ADDL HR: CPT

## 2018-04-26 PROCEDURE — 96375 TX/PRO/DX INJ NEW DRUG ADDON: CPT

## 2018-04-26 RX ORDER — BEVACIZUMAB 400 MG/16ML
555 INJECTION, SOLUTION INTRAVENOUS ONCE
Qty: 0 | Refills: 0 | Status: DISCONTINUED | OUTPATIENT
Start: 2018-04-26 | End: 2018-05-11

## 2018-04-26 RX ORDER — OXALIPLATIN 5 MG/ML
231 INJECTION, SOLUTION INTRAVENOUS ONCE
Qty: 0 | Refills: 0 | Status: DISCONTINUED | OUTPATIENT
Start: 2018-04-26 | End: 2018-05-11

## 2018-04-26 RX ORDER — DEXAMETHASONE 0.5 MG/5ML
20 ELIXIR ORAL ONCE
Qty: 0 | Refills: 0 | Status: DISCONTINUED | OUTPATIENT
Start: 2018-04-26 | End: 2018-05-11

## 2018-04-26 RX ORDER — ONDANSETRON 8 MG/1
16 TABLET, FILM COATED ORAL ONCE
Qty: 0 | Refills: 0 | Status: DISCONTINUED | OUTPATIENT
Start: 2018-04-26 | End: 2018-05-11

## 2018-05-03 DIAGNOSIS — R11.2 NAUSEA WITH VOMITING, UNSPECIFIED: ICD-10-CM

## 2018-05-09 ENCOUNTER — OUTPATIENT (OUTPATIENT)
Dept: OUTPATIENT SERVICES | Facility: HOSPITAL | Age: 58
LOS: 1 days | End: 2018-05-09
Payer: COMMERCIAL

## 2018-05-09 DIAGNOSIS — Z98.891 HISTORY OF UTERINE SCAR FROM PREVIOUS SURGERY: Chronic | ICD-10-CM

## 2018-05-09 LAB — GLUCOSE BLDC GLUCOMTR-MCNC: 77 MG/DL — SIGNIFICANT CHANGE UP (ref 70–99)

## 2018-05-09 PROCEDURE — A9552: CPT

## 2018-05-09 PROCEDURE — 78815 PET IMAGE W/CT SKULL-THIGH: CPT

## 2018-05-09 PROCEDURE — 78815 PET IMAGE W/CT SKULL-THIGH: CPT | Mod: 26

## 2018-05-09 PROCEDURE — 82962 GLUCOSE BLOOD TEST: CPT

## 2018-05-23 RX ORDER — BEVACIZUMAB 400 MG/16ML
542 INJECTION, SOLUTION INTRAVENOUS ONCE
Qty: 0 | Refills: 0 | Status: COMPLETED | OUTPATIENT
Start: 2018-05-24 | End: 2018-05-24

## 2018-05-24 ENCOUNTER — APPOINTMENT (OUTPATIENT)
Dept: INFUSION THERAPY | Facility: HOSPITAL | Age: 58
End: 2018-05-24

## 2018-05-24 ENCOUNTER — OUTPATIENT (OUTPATIENT)
Dept: OUTPATIENT SERVICES | Facility: HOSPITAL | Age: 58
LOS: 1 days | End: 2018-05-24
Payer: COMMERCIAL

## 2018-05-24 VITALS
HEART RATE: 88 BPM | OXYGEN SATURATION: 98 % | TEMPERATURE: 97 F | DIASTOLIC BLOOD PRESSURE: 86 MMHG | SYSTOLIC BLOOD PRESSURE: 148 MMHG | HEIGHT: 61 IN | WEIGHT: 158.95 LBS | RESPIRATION RATE: 16 BRPM

## 2018-05-24 DIAGNOSIS — Z98.891 HISTORY OF UTERINE SCAR FROM PREVIOUS SURGERY: Chronic | ICD-10-CM

## 2018-05-24 DIAGNOSIS — C18.9 MALIGNANT NEOPLASM OF COLON, UNSPECIFIED: ICD-10-CM

## 2018-05-24 RX ORDER — SIMVASTATIN 20 MG/1
1 TABLET, FILM COATED ORAL
Qty: 0 | Refills: 0 | COMMUNITY

## 2018-05-24 RX ADMIN — BEVACIZUMAB 243.36 MILLIGRAM(S): 400 INJECTION, SOLUTION INTRAVENOUS at 13:34

## 2018-06-07 ENCOUNTER — APPOINTMENT (OUTPATIENT)
Dept: INTERVENTIONAL RADIOLOGY/VASCULAR | Facility: HOSPITAL | Age: 58
End: 2018-06-07

## 2018-06-07 ENCOUNTER — OUTPATIENT (OUTPATIENT)
Dept: OUTPATIENT SERVICES | Facility: HOSPITAL | Age: 58
LOS: 1 days | End: 2018-06-07
Payer: COMMERCIAL

## 2018-06-07 DIAGNOSIS — Z98.891 HISTORY OF UTERINE SCAR FROM PREVIOUS SURGERY: Chronic | ICD-10-CM

## 2018-06-07 PROCEDURE — 50389 REMOVE RENAL TUBE W/FLUORO: CPT | Mod: LT

## 2018-06-07 PROCEDURE — C1769: CPT

## 2018-06-07 PROCEDURE — 50389 REMOVE RENAL TUBE W/FLUORO: CPT

## 2018-06-13 RX ORDER — BEVACIZUMAB 400 MG/16ML
552 INJECTION, SOLUTION INTRAVENOUS ONCE
Qty: 0 | Refills: 0 | Status: COMPLETED | OUTPATIENT
Start: 2018-06-14 | End: 2018-06-14

## 2018-06-14 ENCOUNTER — APPOINTMENT (OUTPATIENT)
Dept: INFUSION THERAPY | Facility: HOSPITAL | Age: 58
End: 2018-06-14

## 2018-06-14 ENCOUNTER — OUTPATIENT (OUTPATIENT)
Dept: OUTPATIENT SERVICES | Facility: HOSPITAL | Age: 58
LOS: 1 days | End: 2018-06-14
Payer: COMMERCIAL

## 2018-06-14 VITALS
RESPIRATION RATE: 18 BRPM | HEART RATE: 81 BPM | TEMPERATURE: 100 F | OXYGEN SATURATION: 97 % | DIASTOLIC BLOOD PRESSURE: 87 MMHG | SYSTOLIC BLOOD PRESSURE: 150 MMHG

## 2018-06-14 VITALS
RESPIRATION RATE: 18 BRPM | OXYGEN SATURATION: 97 % | HEIGHT: 61 IN | TEMPERATURE: 99 F | WEIGHT: 162.92 LBS | DIASTOLIC BLOOD PRESSURE: 90 MMHG | SYSTOLIC BLOOD PRESSURE: 138 MMHG | HEART RATE: 97 BPM

## 2018-06-14 DIAGNOSIS — Z98.891 HISTORY OF UTERINE SCAR FROM PREVIOUS SURGERY: Chronic | ICD-10-CM

## 2018-06-14 DIAGNOSIS — C18.9 MALIGNANT NEOPLASM OF COLON, UNSPECIFIED: ICD-10-CM

## 2018-06-14 PROCEDURE — 96413 CHEMO IV INFUSION 1 HR: CPT

## 2018-06-14 RX ADMIN — BEVACIZUMAB 244.16 MILLIGRAM(S): 400 INJECTION, SOLUTION INTRAVENOUS at 13:05

## 2018-06-14 RX ADMIN — Medication 300 UNIT(S): at 14:00

## 2018-06-21 DIAGNOSIS — Z43.6 ENCOUNTER FOR ATTENTION TO OTHER ARTIFICIAL OPENINGS OF URINARY TRACT: ICD-10-CM

## 2018-07-05 ENCOUNTER — APPOINTMENT (OUTPATIENT)
Dept: INFUSION THERAPY | Facility: HOSPITAL | Age: 58
End: 2018-07-05

## 2018-07-20 VITALS
TEMPERATURE: 97 F | HEART RATE: 84 BPM | RESPIRATION RATE: 16 BRPM | OXYGEN SATURATION: 98 % | SYSTOLIC BLOOD PRESSURE: 161 MMHG | HEIGHT: 61 IN | DIASTOLIC BLOOD PRESSURE: 88 MMHG | WEIGHT: 146.17 LBS

## 2018-07-20 NOTE — PATIENT PROFILE ADULT. - PMH
Colon cancer    High cholesterol    HTN (hypertension) Colon cancer    High cholesterol    HTN (hypertension)    Neuropathy due to chemotherapeutic drug

## 2018-07-20 NOTE — PATIENT PROFILE ADULT. - PSH
Colostomy in place  still has  H/O  section    H/O nephrostomy Colostomy in place  still has  H/O  section    H/O nephrostomy    History of colon resection

## 2018-07-20 NOTE — PATIENT PROFILE ADULT. - BLOOD AVOIDANCE/RESTRICTIONS, PROFILE
FORM: [employer] - Biometric Form    Left By:patient    Instructions: please fill in completely and fax to #[phone] once done.     To Be Returned By: n/a    For Further Information, the patient may be contacted at: [phone]    Form was placed in MD's mailbox. Please call pt when paper has been completed/faxed.        none

## 2018-07-23 ENCOUNTER — INPATIENT (INPATIENT)
Facility: HOSPITAL | Age: 58
LOS: 5 days | Discharge: HOME HEALTH | DRG: 329 | End: 2018-07-29
Attending: COLON & RECTAL SURGERY | Admitting: COLON & RECTAL SURGERY
Payer: COMMERCIAL

## 2018-07-23 ENCOUNTER — RESULT REVIEW (OUTPATIENT)
Age: 58
End: 2018-07-23

## 2018-07-23 DIAGNOSIS — Z93.6 OTHER ARTIFICIAL OPENINGS OF URINARY TRACT STATUS: Chronic | ICD-10-CM

## 2018-07-23 DIAGNOSIS — Z98.891 HISTORY OF UTERINE SCAR FROM PREVIOUS SURGERY: Chronic | ICD-10-CM

## 2018-07-23 DIAGNOSIS — Z93.3 COLOSTOMY STATUS: Chronic | ICD-10-CM

## 2018-07-23 DIAGNOSIS — Z90.49 ACQUIRED ABSENCE OF OTHER SPECIFIED PARTS OF DIGESTIVE TRACT: Chronic | ICD-10-CM

## 2018-07-23 LAB
ANION GAP SERPL CALC-SCNC: 16 MMOL/L — SIGNIFICANT CHANGE UP (ref 5–17)
BASE EXCESS BLDA CALC-SCNC: -4.3 MMOL/L — LOW (ref -2–3)
BASE EXCESS BLDA CALC-SCNC: -8 MMOL/L — LOW (ref -2–3)
BASE EXCESS BLDA CALC-SCNC: -9.1 MMOL/L — LOW (ref -2–3)
BLD GP AB SCN SERPL QL: NEGATIVE — SIGNIFICANT CHANGE UP
BUN SERPL-MCNC: 14 MG/DL — SIGNIFICANT CHANGE UP (ref 7–23)
CA-I BLDA-SCNC: 1.12 MMOL/L — SIGNIFICANT CHANGE UP (ref 1.12–1.3)
CA-I BLDA-SCNC: 1.12 MMOL/L — SIGNIFICANT CHANGE UP (ref 1.12–1.3)
CA-I BLDA-SCNC: 1.13 MMOL/L — SIGNIFICANT CHANGE UP (ref 1.12–1.3)
CALCIUM SERPL-MCNC: 7.9 MG/DL — LOW (ref 8.4–10.5)
CHLORIDE SERPL-SCNC: 104 MMOL/L — SIGNIFICANT CHANGE UP (ref 96–108)
CO2 SERPL-SCNC: 22 MMOL/L — SIGNIFICANT CHANGE UP (ref 22–31)
COHGB MFR BLDA: 0.2 % — SIGNIFICANT CHANGE UP
COHGB MFR BLDA: 0.6 % — SIGNIFICANT CHANGE UP
COHGB MFR BLDA: 0.9 % — SIGNIFICANT CHANGE UP
CREAT SERPL-MCNC: 1.27 MG/DL — SIGNIFICANT CHANGE UP (ref 0.5–1.3)
GAS PNL BLDA: SIGNIFICANT CHANGE UP
GLUCOSE BLDC GLUCOMTR-MCNC: 126 MG/DL — HIGH (ref 70–99)
GLUCOSE BLDC GLUCOMTR-MCNC: 140 MG/DL — HIGH (ref 70–99)
GLUCOSE BLDC GLUCOMTR-MCNC: 149 MG/DL — HIGH (ref 70–99)
GLUCOSE BLDC GLUCOMTR-MCNC: 85 MG/DL — SIGNIFICANT CHANGE UP (ref 70–99)
GLUCOSE SERPL-MCNC: 155 MG/DL — HIGH (ref 70–99)
HCO3 BLDA-SCNC: 18 MMOL/L — LOW (ref 21–28)
HCO3 BLDA-SCNC: 18 MMOL/L — LOW (ref 21–28)
HCO3 BLDA-SCNC: 22 MMOL/L — SIGNIFICANT CHANGE UP (ref 21–28)
HCT VFR BLD CALC: 31.3 % — LOW (ref 34.5–45)
HGB BLD-MCNC: 10.5 G/DL — LOW (ref 11.5–15.5)
HGB BLDA-MCNC: 11.7 G/DL — SIGNIFICANT CHANGE UP (ref 11.5–15.5)
HGB BLDA-MCNC: 8.2 G/DL — LOW (ref 11.5–15.5)
HGB BLDA-MCNC: 9.8 G/DL — LOW (ref 11.5–15.5)
MAGNESIUM SERPL-MCNC: 1.5 MG/DL — LOW (ref 1.6–2.6)
MCHC RBC-ENTMCNC: 28.1 PG — SIGNIFICANT CHANGE UP (ref 27–34)
MCHC RBC-ENTMCNC: 33.5 G/DL — SIGNIFICANT CHANGE UP (ref 32–36)
MCV RBC AUTO: 83.7 FL — SIGNIFICANT CHANGE UP (ref 80–100)
METHGB MFR BLDA: 0.4 % — SIGNIFICANT CHANGE UP
METHGB MFR BLDA: 0.6 % — SIGNIFICANT CHANGE UP
METHGB MFR BLDA: 0.7 % — SIGNIFICANT CHANGE UP
O2 CT VFR BLDA CALC: 12.3 ML/DL — SIGNIFICANT CHANGE UP (ref 15–23)
O2 CT VFR BLDA CALC: 14.9 ML/DL — SIGNIFICANT CHANGE UP (ref 15–23)
O2 CT VFR BLDA CALC: 17.2 ML/DL — SIGNIFICANT CHANGE UP (ref 15–23)
OXYHGB MFR BLDA: 98 % — SIGNIFICANT CHANGE UP (ref 94–100)
OXYHGB MFR BLDA: 98 % — SIGNIFICANT CHANGE UP (ref 94–100)
OXYHGB MFR BLDA: 99 % — SIGNIFICANT CHANGE UP (ref 94–100)
PCO2 BLDA: 37 MMHG — SIGNIFICANT CHANGE UP (ref 32–45)
PCO2 BLDA: 43 MMHG — SIGNIFICANT CHANGE UP (ref 32–45)
PCO2 BLDA: 43 MMHG — SIGNIFICANT CHANGE UP (ref 32–45)
PH BLDA: 7.24 — LOW (ref 7.35–7.45)
PH BLDA: 7.3 — LOW (ref 7.35–7.45)
PH BLDA: 7.32 — LOW (ref 7.35–7.45)
PHOSPHATE SERPL-MCNC: 4.6 MG/DL — HIGH (ref 2.5–4.5)
PLATELET # BLD AUTO: 313 K/UL — SIGNIFICANT CHANGE UP (ref 150–400)
PO2 BLDA: 317 MMHG — HIGH (ref 83–108)
PO2 BLDA: 400 MMHG — HIGH (ref 83–108)
PO2 BLDA: 471 MMHG — HIGH (ref 83–108)
POTASSIUM BLDA-SCNC: 3.8 MMOL/L — SIGNIFICANT CHANGE UP (ref 3.5–4.9)
POTASSIUM BLDA-SCNC: 4.1 MMOL/L — SIGNIFICANT CHANGE UP (ref 3.5–4.9)
POTASSIUM BLDA-SCNC: 4.2 MMOL/L — SIGNIFICANT CHANGE UP (ref 3.5–4.9)
POTASSIUM SERPL-MCNC: 4.4 MMOL/L — SIGNIFICANT CHANGE UP (ref 3.5–5.3)
POTASSIUM SERPL-SCNC: 4.4 MMOL/L — SIGNIFICANT CHANGE UP (ref 3.5–5.3)
RBC # BLD: 3.74 M/UL — LOW (ref 3.8–5.2)
RBC # FLD: 16.2 % — SIGNIFICANT CHANGE UP (ref 10.3–16.9)
RH IG SCN BLD-IMP: POSITIVE — SIGNIFICANT CHANGE UP
SAO2 % BLDA: 100 % — SIGNIFICANT CHANGE UP (ref 95–100)
SAO2 % BLDA: 100 % — SIGNIFICANT CHANGE UP (ref 95–100)
SAO2 % BLDA: 99 % — SIGNIFICANT CHANGE UP (ref 95–100)
SODIUM BLDA-SCNC: 135 MMOL/L — LOW (ref 138–146)
SODIUM BLDA-SCNC: 136 MMOL/L — LOW (ref 138–146)
SODIUM BLDA-SCNC: 137 MMOL/L — LOW (ref 138–146)
SODIUM SERPL-SCNC: 142 MMOL/L — SIGNIFICANT CHANGE UP (ref 135–145)
WBC # BLD: 12.6 K/UL — HIGH (ref 3.8–10.5)
WBC # FLD AUTO: 12.6 K/UL — HIGH (ref 3.8–10.5)

## 2018-07-23 PROCEDURE — 58720 REMOVAL OF OVARY/TUBE(S): CPT

## 2018-07-23 RX ORDER — CEFOTETAN DISODIUM 1 G
2 VIAL (EA) INJECTION EVERY 12 HOURS
Qty: 0 | Refills: 0 | Status: COMPLETED | OUTPATIENT
Start: 2018-07-23 | End: 2018-07-24

## 2018-07-23 RX ORDER — CEFOTETAN DISODIUM 1 G
2 VIAL (EA) INJECTION ONCE
Qty: 0 | Refills: 0 | Status: DISCONTINUED | OUTPATIENT
Start: 2018-07-23 | End: 2018-07-23

## 2018-07-23 RX ORDER — MAGNESIUM SULFATE 500 MG/ML
2 VIAL (ML) INJECTION
Qty: 0 | Refills: 0 | Status: COMPLETED | OUTPATIENT
Start: 2018-07-23 | End: 2018-07-23

## 2018-07-23 RX ORDER — HEPARIN SODIUM 5000 [USP'U]/ML
5000 INJECTION INTRAVENOUS; SUBCUTANEOUS EVERY 8 HOURS
Qty: 0 | Refills: 0 | Status: DISCONTINUED | OUTPATIENT
Start: 2018-07-23 | End: 2018-07-29

## 2018-07-23 RX ORDER — AMLODIPINE BESYLATE 2.5 MG/1
1 TABLET ORAL
Qty: 0 | Refills: 0 | COMMUNITY

## 2018-07-23 RX ORDER — HYDROMORPHONE HYDROCHLORIDE 2 MG/ML
0.5 INJECTION INTRAMUSCULAR; INTRAVENOUS; SUBCUTANEOUS EVERY 4 HOURS
Qty: 0 | Refills: 0 | Status: DISCONTINUED | OUTPATIENT
Start: 2018-07-23 | End: 2018-07-26

## 2018-07-23 RX ORDER — OMEPRAZOLE 10 MG/1
1 CAPSULE, DELAYED RELEASE ORAL
Qty: 0 | Refills: 0 | COMMUNITY

## 2018-07-23 RX ORDER — ONDANSETRON 8 MG/1
4 TABLET, FILM COATED ORAL EVERY 6 HOURS
Qty: 0 | Refills: 0 | Status: DISCONTINUED | OUTPATIENT
Start: 2018-07-23 | End: 2018-07-29

## 2018-07-23 RX ORDER — SODIUM CHLORIDE 9 MG/ML
1000 INJECTION, SOLUTION INTRAVENOUS
Qty: 0 | Refills: 0 | Status: DISCONTINUED | OUTPATIENT
Start: 2018-07-23 | End: 2018-07-25

## 2018-07-23 RX ORDER — HYDROMORPHONE HYDROCHLORIDE 2 MG/ML
1 INJECTION INTRAMUSCULAR; INTRAVENOUS; SUBCUTANEOUS EVERY 4 HOURS
Qty: 0 | Refills: 0 | Status: DISCONTINUED | OUTPATIENT
Start: 2018-07-23 | End: 2018-07-26

## 2018-07-23 RX ADMIN — Medication 50 GRAM(S): at 22:18

## 2018-07-23 RX ADMIN — SODIUM CHLORIDE 100 MILLILITER(S): 9 INJECTION, SOLUTION INTRAVENOUS at 22:58

## 2018-07-23 RX ADMIN — HYDROMORPHONE HYDROCHLORIDE 0.5 MILLIGRAM(S): 2 INJECTION INTRAMUSCULAR; INTRAVENOUS; SUBCUTANEOUS at 19:08

## 2018-07-23 RX ADMIN — Medication 50 GRAM(S): at 23:53

## 2018-07-23 RX ADMIN — HYDROMORPHONE HYDROCHLORIDE 1 MILLIGRAM(S): 2 INJECTION INTRAMUSCULAR; INTRAVENOUS; SUBCUTANEOUS at 22:46

## 2018-07-23 RX ADMIN — HYDROMORPHONE HYDROCHLORIDE 1 MILLIGRAM(S): 2 INJECTION INTRAMUSCULAR; INTRAVENOUS; SUBCUTANEOUS at 22:19

## 2018-07-23 RX ADMIN — HEPARIN SODIUM 5000 UNIT(S): 5000 INJECTION INTRAVENOUS; SUBCUTANEOUS at 22:18

## 2018-07-23 RX ADMIN — HYDROMORPHONE HYDROCHLORIDE 0.5 MILLIGRAM(S): 2 INJECTION INTRAMUSCULAR; INTRAVENOUS; SUBCUTANEOUS at 19:25

## 2018-07-23 NOTE — H&P ADULT - ASSESSMENT
57F with metastatic colon cancer s/p neoadjuvant chemotherapy and diverting loop colostomy presents for elective resection of the primary tumor and repair of parastomal hernia.   - Admit to surgery, telemetry bed  - OR for above procedure

## 2018-07-23 NOTE — BRIEF OPERATIVE NOTE - COMMENTS
Required small amount of intermittent phenylephrine, but stable and with no requirements at end of procedure.

## 2018-07-23 NOTE — H&P ADULT - NSHPPHYSICALEXAM_GEN_ALL_CORE
Gen: AAOx3, NAD  Pulm: No respiratory distress, normal effort  Abd: Obese with extensive laxity of the skin and abdominal wall. soft, NT/ND, Well healed Pfannenstiel incision. Colostomy in LLQ with large prolapse, but appears healthy.   Ext: No edema, WWP

## 2018-07-23 NOTE — BRIEF OPERATIVE NOTE - PRE-OP DX
Colon cancer metastasized to multiple sites  07/23/2018    Zulay Hardin  Parastomal hernia without obstruction or gangrene  07/23/2018    Zulay Hardin  Presence of colostomy  07/23/2018    Active  Faizan, Netanel

## 2018-07-23 NOTE — H&P ADULT - NSHPREVIEWOFSYSTEMS_GEN_ALL_CORE
Patient is an otherwise healthy 57 year old female who was recently diagnosed with metastatic colon cancer (liver, lung and bone lesions) with the primary tumor in her proximal sigmoid colon/distal descending colon. She underwent neoadjuvant chemotherapy with Xeloda and Avastin with good response seen on follow up CT/PET scan. Chemotherapy was last administered in May 2018. Of note, she had a diverting loop colostomy done in September 2017 due to obstruction of the bowel by the tumor, and also had a left ureteral obstruction which was treated with a percutaneous nephrostomy, which was subsequently removed after follow up imaging demonstrated resolution of the obstruction. Today she presents for resection of the primary tumor and repair of a large parastomal hernia.

## 2018-07-23 NOTE — BRIEF OPERATIVE NOTE - PROCEDURE
<<-----Click on this checkbox to enter Procedure Bilateral salpingo-oophorectomy  07/23/2018    Active  NALPER  Diagnostic laparoscopy  07/23/2018    Active  NALPER  Exploratory laparotomy  07/23/2018    Active  NALPER  Take-down of colostomy  07/23/2018    Active  NALPER  Sigmoidectomy  07/23/2018    Active  NALPER  Ureterolysis with omental wrap  07/23/2018    Active  NALPER  Cystoscopic insertion of ureteral stent  07/23/2018    Active  NALPER  Parastomal herniorrhaphy  07/23/2018    Active  NALPER  Umbilical herniorrhaphy  07/23/2018    Active  NALPER

## 2018-07-23 NOTE — BRIEF OPERATIVE NOTE - OPERATION/FINDINGS
Diagnostic laparoscopy revealed that large parastomal hernia precluded further progress laparoscopically, so procedure converted to open via midline laparotomy. Colostomy taken down and proximal margin of specimen was transected with the SIA stapler around the distal transverse colon. Distal margin was transected with SIA stapler at proximal rectum, with large, cystic lesion presumed to be adnexal/ovarian in origin. Bilateral salpingo-oophorectomy performed. 29Fr EEA anastomosis created with intact dounuts and negative air leak and betadine leak tests. Extensive left ureterolysis performed to completely mobilize segment of ureter that was the cause of the previous obstruction. Omentum placed behind ureter. Cystoscopy performed with placement of left double-J stent, with drainage of old appearing, cloudy urine. Very large parastomal hernia sac resected. Small umbilical hernia repaired primarily with suture. Fascial flaps mobilized and brought to midline, and closed primarily. Suture line imbricated with stratifix suture. Subcutaneous layer reapproximate with 3-O vicryl, and skin stapled.

## 2018-07-23 NOTE — PROGRESS NOTE ADULT - SUBJECTIVE AND OBJECTIVE BOX
POST-OPERATIVE NOTE    Procedure: Diagnostic laparoscopy, exploratory laparotomy, take-down of colostomy, sigmoidectomy, ureterolysis with omental wrap, cystoscopic insertion of ureteral stent, parastomal herniorrhaphy, umbilical herniorrhaphy, bilateral salpingo-oophorectomy    Diagnosis/Indication: Colon cancer metastasized to multiple sites, parastomal hernia without obstruction or gangrene, presence of colostomy      Surgeon: Dr. Javier    S: Pt has no complaints. Denies CP, SOB, GARCIA, calf tenderness. Pain controlled with medication. Denies nausea, vomiting.    O:  T(C): 36.2 (07-23-18 @ 18:10), Max: 36.2 (07-23-18 @ 18:10)  T(F): 97.1 (07-23-18 @ 18:10), Max: 97.1 (07-23-18 @ 18:10)  HR: 82 (07-23-18 @ 19:40) (78 - 92)  BP: 107/80 (07-23-18 @ 19:40) (107/80 - 155/84)  RR: 16 (07-23-18 @ 19:40) (14 - 16)  SpO2: 100% (07-23-18 @ 19:40) (100% - 100%)  Wt(kg): --                        10.5   12.6  )-----------( 313      ( 23 Jul 2018 18:46 )             31.3     07-23    142  |  104  |  14  ----------------------------<  155<H>  4.4   |  22  |  1.27    Ca    7.9<L>      23 Jul 2018 18:46  Phos  4.6     07-23  Mg     1.5     07-23    Gen: NAD, resting comfortably in bed  C/V: NSR  Pulm: Nonlabored breathing, no respiratory distress  Abd: soft, NT/ND, incision areas are clean, dry and intact  Extrem: WWP, no calf edema or tenderness, SCDs in place    A/P: 57y Female s/p above procedure  Diet: NPO  IVF: LR @ 100  Lorenz  Pain/nausea control  SQH/SCDs/OOBA/IS  Dispo pending pain control, PO tolerance, clinical improvement

## 2018-07-24 LAB
ANION GAP SERPL CALC-SCNC: 15 MMOL/L — SIGNIFICANT CHANGE UP (ref 5–17)
BUN SERPL-MCNC: 18 MG/DL — SIGNIFICANT CHANGE UP (ref 7–23)
CALCIUM SERPL-MCNC: 8.5 MG/DL — SIGNIFICANT CHANGE UP (ref 8.4–10.5)
CHLORIDE SERPL-SCNC: 102 MMOL/L — SIGNIFICANT CHANGE UP (ref 96–108)
CO2 SERPL-SCNC: 23 MMOL/L — SIGNIFICANT CHANGE UP (ref 22–31)
CREAT SERPL-MCNC: 1.55 MG/DL — HIGH (ref 0.5–1.3)
GLUCOSE SERPL-MCNC: 152 MG/DL — HIGH (ref 70–99)
HCT VFR BLD CALC: 29.2 % — LOW (ref 34.5–45)
HGB BLD-MCNC: 9.6 G/DL — LOW (ref 11.5–15.5)
MAGNESIUM SERPL-MCNC: 3 MG/DL — HIGH (ref 1.6–2.6)
MCHC RBC-ENTMCNC: 27.7 PG — SIGNIFICANT CHANGE UP (ref 27–34)
MCHC RBC-ENTMCNC: 32.9 G/DL — SIGNIFICANT CHANGE UP (ref 32–36)
MCV RBC AUTO: 84.1 FL — SIGNIFICANT CHANGE UP (ref 80–100)
PHOSPHATE SERPL-MCNC: 4.9 MG/DL — HIGH (ref 2.5–4.5)
PLATELET # BLD AUTO: 241 K/UL — SIGNIFICANT CHANGE UP (ref 150–400)
POTASSIUM SERPL-MCNC: 4.6 MMOL/L — SIGNIFICANT CHANGE UP (ref 3.5–5.3)
POTASSIUM SERPL-SCNC: 4.6 MMOL/L — SIGNIFICANT CHANGE UP (ref 3.5–5.3)
RBC # BLD: 3.47 M/UL — LOW (ref 3.8–5.2)
RBC # FLD: 16.4 % — SIGNIFICANT CHANGE UP (ref 10.3–16.9)
SODIUM SERPL-SCNC: 140 MMOL/L — SIGNIFICANT CHANGE UP (ref 135–145)
WBC # BLD: 17 K/UL — HIGH (ref 3.8–10.5)
WBC # FLD AUTO: 17 K/UL — HIGH (ref 3.8–10.5)

## 2018-07-24 PROCEDURE — 74018 RADEX ABDOMEN 1 VIEW: CPT | Mod: 26

## 2018-07-24 RX ORDER — SODIUM CHLORIDE 9 MG/ML
500 INJECTION, SOLUTION INTRAVENOUS ONCE
Qty: 0 | Refills: 0 | Status: COMPLETED | OUTPATIENT
Start: 2018-07-24 | End: 2018-07-24

## 2018-07-24 RX ADMIN — HYDROMORPHONE HYDROCHLORIDE 0.5 MILLIGRAM(S): 2 INJECTION INTRAMUSCULAR; INTRAVENOUS; SUBCUTANEOUS at 10:15

## 2018-07-24 RX ADMIN — HYDROMORPHONE HYDROCHLORIDE 0.5 MILLIGRAM(S): 2 INJECTION INTRAMUSCULAR; INTRAVENOUS; SUBCUTANEOUS at 10:03

## 2018-07-24 RX ADMIN — ONDANSETRON 4 MILLIGRAM(S): 8 TABLET, FILM COATED ORAL at 18:05

## 2018-07-24 RX ADMIN — HYDROMORPHONE HYDROCHLORIDE 1 MILLIGRAM(S): 2 INJECTION INTRAMUSCULAR; INTRAVENOUS; SUBCUTANEOUS at 06:47

## 2018-07-24 RX ADMIN — HYDROMORPHONE HYDROCHLORIDE 1 MILLIGRAM(S): 2 INJECTION INTRAMUSCULAR; INTRAVENOUS; SUBCUTANEOUS at 19:50

## 2018-07-24 RX ADMIN — ONDANSETRON 4 MILLIGRAM(S): 8 TABLET, FILM COATED ORAL at 06:47

## 2018-07-24 RX ADMIN — HYDROMORPHONE HYDROCHLORIDE 1 MILLIGRAM(S): 2 INJECTION INTRAMUSCULAR; INTRAVENOUS; SUBCUTANEOUS at 23:16

## 2018-07-24 RX ADMIN — HYDROMORPHONE HYDROCHLORIDE 1 MILLIGRAM(S): 2 INJECTION INTRAMUSCULAR; INTRAVENOUS; SUBCUTANEOUS at 13:45

## 2018-07-24 RX ADMIN — HYDROMORPHONE HYDROCHLORIDE 0.5 MILLIGRAM(S): 2 INJECTION INTRAMUSCULAR; INTRAVENOUS; SUBCUTANEOUS at 03:14

## 2018-07-24 RX ADMIN — Medication 100 GRAM(S): at 18:45

## 2018-07-24 RX ADMIN — HEPARIN SODIUM 5000 UNIT(S): 5000 INJECTION INTRAVENOUS; SUBCUTANEOUS at 22:55

## 2018-07-24 RX ADMIN — HYDROMORPHONE HYDROCHLORIDE 1 MILLIGRAM(S): 2 INJECTION INTRAMUSCULAR; INTRAVENOUS; SUBCUTANEOUS at 22:55

## 2018-07-24 RX ADMIN — HEPARIN SODIUM 5000 UNIT(S): 5000 INJECTION INTRAVENOUS; SUBCUTANEOUS at 05:33

## 2018-07-24 RX ADMIN — HYDROMORPHONE HYDROCHLORIDE 1 MILLIGRAM(S): 2 INJECTION INTRAMUSCULAR; INTRAVENOUS; SUBCUTANEOUS at 13:09

## 2018-07-24 RX ADMIN — HYDROMORPHONE HYDROCHLORIDE 1 MILLIGRAM(S): 2 INJECTION INTRAMUSCULAR; INTRAVENOUS; SUBCUTANEOUS at 07:38

## 2018-07-24 RX ADMIN — SODIUM CHLORIDE 1000 MILLILITER(S): 9 INJECTION, SOLUTION INTRAVENOUS at 02:56

## 2018-07-24 RX ADMIN — SODIUM CHLORIDE 1000 MILLILITER(S): 9 INJECTION, SOLUTION INTRAVENOUS at 03:37

## 2018-07-24 RX ADMIN — Medication 100 GRAM(S): at 05:34

## 2018-07-24 RX ADMIN — HEPARIN SODIUM 5000 UNIT(S): 5000 INJECTION INTRAVENOUS; SUBCUTANEOUS at 13:14

## 2018-07-24 RX ADMIN — HYDROMORPHONE HYDROCHLORIDE 0.5 MILLIGRAM(S): 2 INJECTION INTRAMUSCULAR; INTRAVENOUS; SUBCUTANEOUS at 03:36

## 2018-07-24 RX ADMIN — HYDROMORPHONE HYDROCHLORIDE 1 MILLIGRAM(S): 2 INJECTION INTRAMUSCULAR; INTRAVENOUS; SUBCUTANEOUS at 18:54

## 2018-07-24 NOTE — PROGRESS NOTE ADULT - ASSESSMENT
57F with metastatic colon cancer s/p neoadjuvant chemotherapy and diverting loop colostomy now POD1 s/p colostomy takedown, sigmoidectomy, BSO, and parastomal/umbilical herniorraphy.     - dilaudid for pain  - LR@ 100 until tolerates clears  - NPO until flatus  - zofran for nausea  - 24 hours cefotetan for op prophylaxis  - has roman  - SQH/SCDs

## 2018-07-24 NOTE — PROGRESS NOTE ADULT - SUBJECTIVE AND OBJECTIVE BOX
DAILY PROGRESS NOTE:    S: Pain controlled. Patient denies N/V.    O:    Vital Signs Last 24 Hrs  T(C): 35.9 (24 Jul 2018 05:22), Max: 36.2 (23 Jul 2018 18:10)  T(F): 96.7 (24 Jul 2018 05:22), Max: 97.1 (23 Jul 2018 18:10)  HR: 72 (24 Jul 2018 07:00) (68 - 96)  BP: 119/73 (24 Jul 2018 07:00) (97/76 - 155/84)  BP(mean): 88 (24 Jul 2018 07:00) (77 - 107)  RR: 12 (24 Jul 2018 07:00) (6 - 16)  SpO2: 100% (24 Jul 2018 07:00) (100% - 100%)    I&O's Detail    23 Jul 2018 07:01  -  24 Jul 2018 07:00  --------------------------------------------------------  IN:    IV PiggyBack: 100 mL    Lactated Ringers IV Bolus: 1000 mL    lactated ringers.: 1000 mL    Oral Fluid: 210 mL  Total IN: 2310 mL    OUT:    Bulb: 70 mL    Indwelling Catheter - Urethral: 295 mL  Total OUT: 365 mL    Total NET: 1945 mL      24 Jul 2018 07:01  -  24 Jul 2018 07:52  --------------------------------------------------------  IN:    lactated ringers.: 100 mL  Total IN: 100 mL    OUT:  Total OUT: 0 mL    Total NET: 100 mL          Physical Exam:    General: no acute distress  Pulm: normal respiratory effort and excursion  Abdomen: appropriately TTP, L abdominal dressing clean dry and intact    LABS:                        9.6    17.0  )-----------( 241      ( 24 Jul 2018 05:23 )             29.2     07-24    140  |  102  |  18  ----------------------------<  152<H>  4.6   |  23  |  1.55<H>    Ca    8.5      24 Jul 2018 05:23  Phos  4.9     07-24  Mg     3.0     07-24 DAILY PROGRESS NOTE:    S: Pain controlled. Patient denies N/V. Low UOP post-op (0.2 ml/kg/hr, bolused 500 LR x2)    O:    Vital Signs Last 24 Hrs  T(C): 35.9 (24 Jul 2018 05:22), Max: 36.2 (23 Jul 2018 18:10)  T(F): 96.7 (24 Jul 2018 05:22), Max: 97.1 (23 Jul 2018 18:10)  HR: 72 (24 Jul 2018 07:00) (68 - 96)  BP: 119/73 (24 Jul 2018 07:00) (97/76 - 155/84)  BP(mean): 88 (24 Jul 2018 07:00) (77 - 107)  RR: 12 (24 Jul 2018 07:00) (6 - 16)  SpO2: 100% (24 Jul 2018 07:00) (100% - 100%)    I&O's Detail    23 Jul 2018 07:01  -  24 Jul 2018 07:00  --------------------------------------------------------  IN:    IV PiggyBack: 100 mL    Lactated Ringers IV Bolus: 1000 mL    lactated ringers.: 1000 mL    Oral Fluid: 210 mL  Total IN: 2310 mL    OUT:    Bulb: 70 mL    Indwelling Catheter - Urethral: 295 mL  Total OUT: 365 mL    Total NET: 1945 mL      24 Jul 2018 07:01  -  24 Jul 2018 07:52  --------------------------------------------------------  IN:    lactated ringers.: 100 mL  Total IN: 100 mL    OUT:  Total OUT: 0 mL    Total NET: 100 mL          Physical Exam:    General: no acute distress  Pulm: normal respiratory effort and excursion  Abdomen: appropriately TTP, L abdominal dressing clean dry and intact    LABS:                        9.6    17.0  )-----------( 241      ( 24 Jul 2018 05:23 )             29.2     07-24    140  |  102  |  18  ----------------------------<  152<H>  4.6   |  23  |  1.55<H>    Ca    8.5      24 Jul 2018 05:23  Phos  4.9     07-24  Mg     3.0     07-24

## 2018-07-24 NOTE — PROGRESS NOTE ADULT - SUBJECTIVE AND OBJECTIVE BOX
SUBJECTIVE:   Patient reports soreness at incision sites. Patient denies flatus or bowel movement. Denies N/V, SOB, and calf tenderness. CHEY drain in place draining serosanguinous fluid.     cefoTEtan  IVPB 2 Gram(s) IV Intermittent every 12 hours  heparin  Injectable 5000 Unit(s) SubCutaneous every 8 hours      Vital Signs Last 24 Hrs  T(C): 35.9 (24 Jul 2018 05:22), Max: 36.2 (23 Jul 2018 18:10)  T(F): 96.7 (24 Jul 2018 05:22), Max: 97.1 (23 Jul 2018 18:10)  HR: 72 (24 Jul 2018 05:00) (70 - 96)  BP: 120/75 (24 Jul 2018 05:00) (97/76 - 155/84)  BP(mean): 94 (24 Jul 2018 05:00) (77 - 107)  RR: 14 (24 Jul 2018 05:00) (6 - 16)  SpO2: 100% (24 Jul 2018 05:00) (100% - 100%)    General: NAD, resting comfortably in bed  Pulm: Nonlabored breathing, no respiratory distress  Abd: soft, NT/ND, incision areas are clean, dry and intact  Extrem: WWP, no edema, SCDs in place      LABS:                        10.5   12.6  )-----------( 313      ( 23 Jul 2018 18:46 )             31.3     07-23    142  |  104  |  14  ----------------------------<  155<H>  4.4   |  22  |  1.27    Ca    7.9<L>      23 Jul 2018 18:46  Phos  4.6     07-23  Mg     1.5     07-23

## 2018-07-24 NOTE — PROGRESS NOTE ADULT - SUBJECTIVE AND OBJECTIVE BOX
POD#1  AVSS  Abd soft  adequate UO  CR increased to 1.5  H&H OK    OOB  NPO  Continue IVAB for urologic procedure

## 2018-07-24 NOTE — PROGRESS NOTE ADULT - ASSESSMENT
57F with metastatic colon cancer s/p neoadjuvant chemotherapy and diverting loop colostomy presents for elective resection of the primary tumor and repair of parastomal hernia.      NPO  IV  Lorenz  Pain/nausea control  SQH/SCDs/OOBA/IS  Dispo pending pain control, PO tolerance, clinical improvement

## 2018-07-25 LAB
ANION GAP SERPL CALC-SCNC: 9 MMOL/L — SIGNIFICANT CHANGE UP (ref 5–17)
BUN SERPL-MCNC: 17 MG/DL — SIGNIFICANT CHANGE UP (ref 7–23)
CALCIUM SERPL-MCNC: 9.1 MG/DL — SIGNIFICANT CHANGE UP (ref 8.4–10.5)
CHLORIDE SERPL-SCNC: 108 MMOL/L — SIGNIFICANT CHANGE UP (ref 96–108)
CO2 SERPL-SCNC: 29 MMOL/L — SIGNIFICANT CHANGE UP (ref 22–31)
CREAT SERPL-MCNC: 1.4 MG/DL — HIGH (ref 0.5–1.3)
GLUCOSE SERPL-MCNC: 106 MG/DL — HIGH (ref 70–99)
HCT VFR BLD CALC: 26 % — LOW (ref 34.5–45)
HGB BLD-MCNC: 8.4 G/DL — LOW (ref 11.5–15.5)
MAGNESIUM SERPL-MCNC: 2.5 MG/DL — SIGNIFICANT CHANGE UP (ref 1.6–2.6)
MCHC RBC-ENTMCNC: 27.5 PG — SIGNIFICANT CHANGE UP (ref 27–34)
MCHC RBC-ENTMCNC: 32.3 G/DL — SIGNIFICANT CHANGE UP (ref 32–36)
MCV RBC AUTO: 85.2 FL — SIGNIFICANT CHANGE UP (ref 80–100)
PHOSPHATE SERPL-MCNC: 2.3 MG/DL — LOW (ref 2.5–4.5)
PLATELET # BLD AUTO: 168 K/UL — SIGNIFICANT CHANGE UP (ref 150–400)
POTASSIUM SERPL-MCNC: 4.6 MMOL/L — SIGNIFICANT CHANGE UP (ref 3.5–5.3)
POTASSIUM SERPL-SCNC: 4.6 MMOL/L — SIGNIFICANT CHANGE UP (ref 3.5–5.3)
RBC # BLD: 3.05 M/UL — LOW (ref 3.8–5.2)
RBC # FLD: 16.6 % — SIGNIFICANT CHANGE UP (ref 10.3–16.9)
SODIUM SERPL-SCNC: 146 MMOL/L — HIGH (ref 135–145)
WBC # BLD: 12 K/UL — HIGH (ref 3.8–10.5)
WBC # FLD AUTO: 12 K/UL — HIGH (ref 3.8–10.5)

## 2018-07-25 RX ORDER — SODIUM CHLORIDE 9 MG/ML
1000 INJECTION, SOLUTION INTRAVENOUS
Qty: 0 | Refills: 0 | Status: DISCONTINUED | OUTPATIENT
Start: 2018-07-25 | End: 2018-07-27

## 2018-07-25 RX ORDER — POTASSIUM PHOSPHATE, MONOBASIC POTASSIUM PHOSPHATE, DIBASIC 236; 224 MG/ML; MG/ML
21 INJECTION, SOLUTION INTRAVENOUS ONCE
Qty: 0 | Refills: 0 | Status: COMPLETED | OUTPATIENT
Start: 2018-07-25 | End: 2018-07-25

## 2018-07-25 RX ADMIN — SODIUM CHLORIDE 100 MILLILITER(S): 9 INJECTION, SOLUTION INTRAVENOUS at 11:41

## 2018-07-25 RX ADMIN — HYDROMORPHONE HYDROCHLORIDE 0.5 MILLIGRAM(S): 2 INJECTION INTRAMUSCULAR; INTRAVENOUS; SUBCUTANEOUS at 11:36

## 2018-07-25 RX ADMIN — HEPARIN SODIUM 5000 UNIT(S): 5000 INJECTION INTRAVENOUS; SUBCUTANEOUS at 21:37

## 2018-07-25 RX ADMIN — HEPARIN SODIUM 5000 UNIT(S): 5000 INJECTION INTRAVENOUS; SUBCUTANEOUS at 13:31

## 2018-07-25 RX ADMIN — SODIUM CHLORIDE 100 MILLILITER(S): 9 INJECTION, SOLUTION INTRAVENOUS at 23:32

## 2018-07-25 RX ADMIN — HEPARIN SODIUM 5000 UNIT(S): 5000 INJECTION INTRAVENOUS; SUBCUTANEOUS at 05:07

## 2018-07-25 RX ADMIN — HYDROMORPHONE HYDROCHLORIDE 1 MILLIGRAM(S): 2 INJECTION INTRAMUSCULAR; INTRAVENOUS; SUBCUTANEOUS at 05:14

## 2018-07-25 RX ADMIN — HYDROMORPHONE HYDROCHLORIDE 1 MILLIGRAM(S): 2 INJECTION INTRAMUSCULAR; INTRAVENOUS; SUBCUTANEOUS at 17:03

## 2018-07-25 RX ADMIN — HYDROMORPHONE HYDROCHLORIDE 1 MILLIGRAM(S): 2 INJECTION INTRAMUSCULAR; INTRAVENOUS; SUBCUTANEOUS at 04:54

## 2018-07-25 RX ADMIN — POTASSIUM PHOSPHATE, MONOBASIC POTASSIUM PHOSPHATE, DIBASIC 64.25 MILLIMOLE(S): 236; 224 INJECTION, SOLUTION INTRAVENOUS at 09:58

## 2018-07-25 RX ADMIN — HYDROMORPHONE HYDROCHLORIDE 1 MILLIGRAM(S): 2 INJECTION INTRAMUSCULAR; INTRAVENOUS; SUBCUTANEOUS at 23:32

## 2018-07-25 RX ADMIN — HYDROMORPHONE HYDROCHLORIDE 0.5 MILLIGRAM(S): 2 INJECTION INTRAMUSCULAR; INTRAVENOUS; SUBCUTANEOUS at 12:29

## 2018-07-25 NOTE — PROGRESS NOTE ADULT - SUBJECTIVE AND OBJECTIVE BOX
SUBJECTIVE: Patient seen and examined bedside by chief resident.  pt admits to flatus. pain is controlled. would like to try clears. small emesis overnight however denies nausea and vomiting now     heparin  Injectable 5000 Unit(s) SubCutaneous every 8 hours      Vital Signs Last 24 Hrs  T(C): 36.6 (25 Jul 2018 09:16), Max: 36.9 (25 Jul 2018 04:46)  T(F): 97.8 (25 Jul 2018 09:16), Max: 98.4 (25 Jul 2018 04:46)  HR: 84 (25 Jul 2018 08:28) (78 - 84)  BP: 131/71 (25 Jul 2018 08:28) (111/63 - 140/72)  BP(mean): 94 (25 Jul 2018 08:28) (82 - 100)  RR: 16 (25 Jul 2018 08:28) (15 - 18)  SpO2: 95% (25 Jul 2018 08:28) (95% - 97%)  I&O's Detail    24 Jul 2018 07:01  -  25 Jul 2018 07:00  --------------------------------------------------------  IN:    lactated ringers.: 2300 mL  Total IN: 2300 mL    OUT:    Bulb: 130 mL    Indwelling Catheter - Urethral: 1195 mL  Total OUT: 1325 mL    Total NET: 975 mL      25 Jul 2018 07:01  -  25 Jul 2018 11:03  --------------------------------------------------------  IN:    dextrose 5% + sodium chloride 0.45%.: 200 mL    IV PiggyBack: 250 mL  Total IN: 450 mL    OUT:  Total OUT: 0 mL    Total NET: 450 mL          General: NAD, resting comfortably in bed  C/V: NSR  Pulm: Nonlabored breathing, no respiratory distress  Abd: soft, NT/ND, incisions c/d/i  Extrem: WWP, no edema, SCDs in place        LABS:                        8.4    12.0  )-----------( 168      ( 25 Jul 2018 06:00 )             26.0     07-25    146<H>  |  108  |  17  ----------------------------<  106<H>  4.6   |  29  |  1.40<H>    Ca    9.1      25 Jul 2018 06:00  Phos  2.3     07-25  Mg     2.5     07-25            RADIOLOGY & ADDITIONAL STUDIES:

## 2018-07-25 NOTE — PROGRESS NOTE ADULT - SUBJECTIVE AND OBJECTIVE BOX
passing flatus  AVSS  Abd soft  adequate uo  CR 1.4  HGB 8.4 gm    Clears OOB  Pulmonary toilet   F/U

## 2018-07-25 NOTE — PROGRESS NOTE ADULT - ASSESSMENT
57F with metastatic colon cancer s/p neoadjuvant chemotherapy and diverting loop colostomy presents for elective resection of the primary tumor and repair of parastomal hernia.     Admit to surgery, telemetry bed  Pain/nausea control  LR @ 100  CLD  Lorenz  SQH  OOB/Amb/SCDs  F/u AM labs

## 2018-07-26 ENCOUNTER — APPOINTMENT (OUTPATIENT)
Dept: INFUSION THERAPY | Facility: HOSPITAL | Age: 58
End: 2018-07-26

## 2018-07-26 LAB
ANION GAP SERPL CALC-SCNC: 10 MMOL/L — SIGNIFICANT CHANGE UP (ref 5–17)
BUN SERPL-MCNC: 12 MG/DL — SIGNIFICANT CHANGE UP (ref 7–23)
CALCIUM SERPL-MCNC: 9.5 MG/DL — SIGNIFICANT CHANGE UP (ref 8.4–10.5)
CHLORIDE SERPL-SCNC: 103 MMOL/L — SIGNIFICANT CHANGE UP (ref 96–108)
CO2 SERPL-SCNC: 28 MMOL/L — SIGNIFICANT CHANGE UP (ref 22–31)
CREAT SERPL-MCNC: 1 MG/DL — SIGNIFICANT CHANGE UP (ref 0.5–1.3)
GLUCOSE SERPL-MCNC: 103 MG/DL — HIGH (ref 70–99)
HCT VFR BLD CALC: 27.7 % — LOW (ref 34.5–45)
HGB BLD-MCNC: 8.7 G/DL — LOW (ref 11.5–15.5)
MAGNESIUM SERPL-MCNC: 2.2 MG/DL — SIGNIFICANT CHANGE UP (ref 1.6–2.6)
MCHC RBC-ENTMCNC: 27.4 PG — SIGNIFICANT CHANGE UP (ref 27–34)
MCHC RBC-ENTMCNC: 31.4 G/DL — LOW (ref 32–36)
MCV RBC AUTO: 87.4 FL — SIGNIFICANT CHANGE UP (ref 80–100)
PHOSPHATE SERPL-MCNC: 1.5 MG/DL — LOW (ref 2.5–4.5)
PLATELET # BLD AUTO: 168 K/UL — SIGNIFICANT CHANGE UP (ref 150–400)
POTASSIUM SERPL-MCNC: 3.8 MMOL/L — SIGNIFICANT CHANGE UP (ref 3.5–5.3)
POTASSIUM SERPL-SCNC: 3.8 MMOL/L — SIGNIFICANT CHANGE UP (ref 3.5–5.3)
RBC # BLD: 3.17 M/UL — LOW (ref 3.8–5.2)
RBC # FLD: 16.9 % — SIGNIFICANT CHANGE UP (ref 10.3–16.9)
SODIUM SERPL-SCNC: 141 MMOL/L — SIGNIFICANT CHANGE UP (ref 135–145)
WBC # BLD: 10.6 K/UL — HIGH (ref 3.8–10.5)
WBC # FLD AUTO: 10.6 K/UL — HIGH (ref 3.8–10.5)

## 2018-07-26 RX ORDER — PANTOPRAZOLE SODIUM 20 MG/1
40 TABLET, DELAYED RELEASE ORAL ONCE
Qty: 0 | Refills: 0 | Status: COMPLETED | OUTPATIENT
Start: 2018-07-26 | End: 2018-07-26

## 2018-07-26 RX ORDER — OXYCODONE AND ACETAMINOPHEN 5; 325 MG/1; MG/1
2 TABLET ORAL EVERY 4 HOURS
Qty: 0 | Refills: 0 | Status: DISCONTINUED | OUTPATIENT
Start: 2018-07-26 | End: 2018-07-29

## 2018-07-26 RX ORDER — PANTOPRAZOLE SODIUM 20 MG/1
40 TABLET, DELAYED RELEASE ORAL
Qty: 0 | Refills: 0 | Status: DISCONTINUED | OUTPATIENT
Start: 2018-07-26 | End: 2018-07-29

## 2018-07-26 RX ORDER — OXYCODONE AND ACETAMINOPHEN 5; 325 MG/1; MG/1
1 TABLET ORAL EVERY 4 HOURS
Qty: 0 | Refills: 0 | Status: DISCONTINUED | OUTPATIENT
Start: 2018-07-26 | End: 2018-07-29

## 2018-07-26 RX ADMIN — HEPARIN SODIUM 5000 UNIT(S): 5000 INJECTION INTRAVENOUS; SUBCUTANEOUS at 05:56

## 2018-07-26 RX ADMIN — HEPARIN SODIUM 5000 UNIT(S): 5000 INJECTION INTRAVENOUS; SUBCUTANEOUS at 14:00

## 2018-07-26 RX ADMIN — PANTOPRAZOLE SODIUM 40 MILLIGRAM(S): 20 TABLET, DELAYED RELEASE ORAL at 09:35

## 2018-07-26 RX ADMIN — OXYCODONE AND ACETAMINOPHEN 2 TABLET(S): 5; 325 TABLET ORAL at 19:18

## 2018-07-26 RX ADMIN — Medication 64.25 MILLIMOLE(S): at 12:15

## 2018-07-26 RX ADMIN — HEPARIN SODIUM 5000 UNIT(S): 5000 INJECTION INTRAVENOUS; SUBCUTANEOUS at 23:22

## 2018-07-26 RX ADMIN — OXYCODONE AND ACETAMINOPHEN 2 TABLET(S): 5; 325 TABLET ORAL at 12:30

## 2018-07-26 RX ADMIN — HYDROMORPHONE HYDROCHLORIDE 1 MILLIGRAM(S): 2 INJECTION INTRAMUSCULAR; INTRAVENOUS; SUBCUTANEOUS at 00:00

## 2018-07-26 RX ADMIN — OXYCODONE AND ACETAMINOPHEN 2 TABLET(S): 5; 325 TABLET ORAL at 11:37

## 2018-07-26 NOTE — PROGRESS NOTE ADULT - SUBJECTIVE AND OBJECTIVE BOX
SUBJECTIVE:   Patient states that pain is controlled. She is tolerating diet, but states she still has a low appetite. Patient states that she passed flatus yesterday, but has not had any since. She denies bowel movements.     heparin  Injectable 5000 Unit(s) SubCutaneous every 8 hours      Vital Signs Last 24 Hrs  T(C): 36.5 (26 Jul 2018 05:39), Max: 36.6 (25 Jul 2018 09:16)  T(F): 97.7 (26 Jul 2018 05:39), Max: 97.8 (25 Jul 2018 09:16)  HR: 82 (26 Jul 2018 05:39) (80 - 84)  BP: 132/76 (26 Jul 2018 05:39) (128/78 - 151/86)  BP(mean): 98 (25 Jul 2018 15:54) (94 - 111)  RR: 16 (26 Jul 2018 05:39) (16 - 18)  SpO2: 97% (26 Jul 2018 05:39) (95% - 99%)    General: NAD, resting comfortably in bed  Pulm: Nonlabored breathing, no respiratory distress  Abd: soft, NT/ND.  Extrem: WWP, no edema, SCDs in place      LABS:                        8.4    12.0  )-----------( 168      ( 25 Jul 2018 06:00 )             26.0     07-25    146<H>  |  108  |  17  ----------------------------<  106<H>  4.6   |  29  |  1.40<H>    Ca    9.1      25 Jul 2018 06:00  Phos  2.3     07-25  Mg     2.5     07-25 SUBJECTIVE:   Patient states that pain is controlled. She is complaining of acid reflux this morning. She is tolerating diet, but states she still has a low appetite. Patient states that she passed flatus yesterday, but has not had any since. She denies bowel movements.     heparin  Injectable 5000 Unit(s) SubCutaneous every 8 hours      Vital Signs Last 24 Hrs  T(C): 36.5 (26 Jul 2018 05:39), Max: 36.6 (25 Jul 2018 09:16)  T(F): 97.7 (26 Jul 2018 05:39), Max: 97.8 (25 Jul 2018 09:16)  HR: 82 (26 Jul 2018 05:39) (80 - 84)  BP: 132/76 (26 Jul 2018 05:39) (128/78 - 151/86)  BP(mean): 98 (25 Jul 2018 15:54) (94 - 111)  RR: 16 (26 Jul 2018 05:39) (16 - 18)  SpO2: 97% (26 Jul 2018 05:39) (95% - 99%)    General: NAD, resting comfortably in bed  Pulm: Nonlabored breathing, no respiratory distress  Abd: soft, NT/ND.  Extrem: WWP, no edema, SCDs in place      LABS:                        8.4    12.0  )-----------( 168      ( 25 Jul 2018 06:00 )             26.0     07-25    146<H>  |  108  |  17  ----------------------------<  106<H>  4.6   |  29  |  1.40<H>    Ca    9.1      25 Jul 2018 06:00  Phos  2.3     07-25  Mg     2.5     07-25 SUBJECTIVE:   Patient states that pain is controlled. She is complaining of acid reflux this morning. She is tolerating diet, but states she still has a low appetite. Patient states that she passed flatus yesterday, but has not had any since. She denies bowel movements. Denies nausea/vomiting.     heparin  Injectable 5000 Unit(s) SubCutaneous every 8 hours      Vital Signs Last 24 Hrs  T(C): 36.5 (26 Jul 2018 05:39), Max: 36.6 (25 Jul 2018 09:16)  T(F): 97.7 (26 Jul 2018 05:39), Max: 97.8 (25 Jul 2018 09:16)  HR: 82 (26 Jul 2018 05:39) (80 - 84)  BP: 132/76 (26 Jul 2018 05:39) (128/78 - 151/86)  BP(mean): 98 (25 Jul 2018 15:54) (94 - 111)  RR: 16 (26 Jul 2018 05:39) (16 - 18)  SpO2: 97% (26 Jul 2018 05:39) (95% - 99%)    General: NAD, resting comfortably in bed  Pulm: Nonlabored breathing, no respiratory distress  Abd: soft, NT/ND. incision C/D/I, staples in place  Extrem: WWP, no edema, SCDs in place      LABS:                        8.4    12.0  )-----------( 168      ( 25 Jul 2018 06:00 )             26.0     07-25    146<H>  |  108  |  17  ----------------------------<  106<H>  4.6   |  29  |  1.40<H>    Ca    9.1      25 Jul 2018 06:00  Phos  2.3     07-25  Mg     2.5     07-25

## 2018-07-26 NOTE — PROGRESS NOTE ADULT - SUBJECTIVE AND OBJECTIVE BOX
no flatus  tolerating clears  AVSS  ABD: soft  incision clean  excellent UO    OOB   F/U  Await return of bowel function

## 2018-07-26 NOTE — PROGRESS NOTE ADULT - ASSESSMENT
Assessment  57F with metastatic colon cancer s/p neoadjuvant chemotherapy and diverting loop colostomy presents for elective resection of the primary tumor and repair of parastomal hernia. Doing well.    Plan:  Pain/nausea control  LR @ 100  CLD  Lorenz  SQH Assessment  57F with metastatic colon cancer s/p neoadjuvant chemotherapy and diverting loop colostomy presents for elective resection of the primary tumor and repair of parastomal hernia. Doing well.    Plan:  f/u Urology  c/w CLD  Protonix for acid reflux  Pain/nausea control  LR @ 100  Lorenz  SQH Assessment  57F with metastatic colon cancer s/p neoadjuvant chemotherapy and diverting loop colostomy POD3 s/p elective resection of the primary tumor and repair of parastomal hernia. Doing well.    Plan:  remove roman per urology, TOV  CLD  Protonix for acid reflux  Pain/nausea control  SQH

## 2018-07-26 NOTE — CHART NOTE - NSCHARTNOTEFT_GEN_A_CORE
Upon Nutritional Assessment by the Registered Dietitian your patient was determined to meet criteria / has evidence of the following diagnosis/diagnoses:          [ ]  Mild Protein Calorie Malnutrition        [ ]  Moderate Protein Calorie Malnutrition        [ x ] Severe Protein Calorie Malnutrition        [ ] Unspecified Protein Calorie Malnutrition        [ ] Underweight / BMI <19        [ ] Morbid Obesity / BMI > 40      Findings as based on:  •  Comprehensive nutrition assessment and consultation  •  Calorie counts (nutrient intake analysis)  •  Food acceptance and intake status from observations by staff  •  Follow up  •  Patient education  •  Intervention secondary to interdisciplinary rounds  •   concerns    >> Pt reports UBW was 190lb back in September of 2017. Wt at this admission was 146lbs  >> 23% wt change in <1 year  >> Per dietary recall pt has been meeting <75% estimated needs for >1 mo  >> Difficulty meeting already increased needs 2/2 metastatic Ca undergoing chemotherapy  >> Upon NFPE, visible fat loss evidenced by thin skin folds in upper arm region    Treatment:    The following diet has been recommended:  1) Advance diet Fulls w/ Ensures TID (1050kcal, 60g pro) >> low fiber w/ Ensures TID (1050kcal, 60g pro)  2) MVI daily  3) Trend weights and assess for further changes  4) Monitor tolerance as diet advances  5) Encourage small frequent meals w/ ONS between meal times to optimize overall kcal/pro intake    PROVIDER Section:     By signing this assessment you are acknowledging and agree with the diagnosis/diagnoses assigned by the Registered Dietitian    Comments:

## 2018-07-26 NOTE — DIETITIAN INITIAL EVALUATION ADULT. - NS AS NUTRI INTERV ED CONTENT
Purpose of the nutrition education/strategies for optimizing kcal/pro intake and avoiding further wt loss

## 2018-07-26 NOTE — DIETITIAN INITIAL EVALUATION ADULT. - OTHER INFO
56yo F with metastatic colon cancer s/p neoadjuvant chemotherapy and diverting loop colostomy presents for elective resection of the primary tumor and repair of parastomal hernia. Pt seen resting in bed. Currently on a clear liquid diet (as of 7/25) and tolerating PO. Had broth and juice this am. Denies N/V, +flatus/-BM. C/o acid reflux- being medically managed. Pt reports UBW was 190lb back in September of 2017. Wt at this admission was 146lbs. Pt reports wt loss is unintentional and this is the lowest she has weighed. Has had persistent lack of appetite and experiences early satiety. Pt became visibly upset by current medical condition. Discussed w/ pt extensively strategies for maintaining current muscle weight and stimulating appetite (nutrient dense snacks, oral nutrition supplements, meal plans for home). Pt is suspected to have severe protein calorie malnutrition 2/2 thin skin folds in upper arm region, 23% wt loss <1 year and meeting <75% estimated needs >1 mo. Pt has NKFA or dietary restricitons. Skin: surgical incision; GI: abdominal discomfort per flowsheet.  Recommend EnsureEnlive TID once diet is advanced and encouraging ONS between meals to optimize overall kcal/pro intake. MVI daily. See malnutrition chart note.

## 2018-07-27 LAB
ANION GAP SERPL CALC-SCNC: 10 MMOL/L — SIGNIFICANT CHANGE UP (ref 5–17)
BUN SERPL-MCNC: 8 MG/DL — SIGNIFICANT CHANGE UP (ref 7–23)
CALCIUM SERPL-MCNC: 8.5 MG/DL — SIGNIFICANT CHANGE UP (ref 8.4–10.5)
CHLORIDE SERPL-SCNC: 104 MMOL/L — SIGNIFICANT CHANGE UP (ref 96–108)
CO2 SERPL-SCNC: 27 MMOL/L — SIGNIFICANT CHANGE UP (ref 22–31)
CREAT SERPL-MCNC: 0.98 MG/DL — SIGNIFICANT CHANGE UP (ref 0.5–1.3)
GLUCOSE SERPL-MCNC: 105 MG/DL — HIGH (ref 70–99)
HCT VFR BLD CALC: 25.6 % — LOW (ref 34.5–45)
HGB BLD-MCNC: 8 G/DL — LOW (ref 11.5–15.5)
MAGNESIUM SERPL-MCNC: 1.9 MG/DL — SIGNIFICANT CHANGE UP (ref 1.6–2.6)
MCHC RBC-ENTMCNC: 27.5 PG — SIGNIFICANT CHANGE UP (ref 27–34)
MCHC RBC-ENTMCNC: 31.3 G/DL — LOW (ref 32–36)
MCV RBC AUTO: 88 FL — SIGNIFICANT CHANGE UP (ref 80–100)
PHOSPHATE SERPL-MCNC: 2.8 MG/DL — SIGNIFICANT CHANGE UP (ref 2.5–4.5)
PLATELET # BLD AUTO: 178 K/UL — SIGNIFICANT CHANGE UP (ref 150–400)
POTASSIUM SERPL-MCNC: 3.8 MMOL/L — SIGNIFICANT CHANGE UP (ref 3.5–5.3)
POTASSIUM SERPL-SCNC: 3.8 MMOL/L — SIGNIFICANT CHANGE UP (ref 3.5–5.3)
RBC # BLD: 2.91 M/UL — LOW (ref 3.8–5.2)
RBC # FLD: 17.1 % — HIGH (ref 10.3–16.9)
SODIUM SERPL-SCNC: 141 MMOL/L — SIGNIFICANT CHANGE UP (ref 135–145)
WBC # BLD: 8 K/UL — SIGNIFICANT CHANGE UP (ref 3.8–10.5)
WBC # FLD AUTO: 8 K/UL — SIGNIFICANT CHANGE UP (ref 3.8–10.5)

## 2018-07-27 RX ORDER — MAGNESIUM SULFATE 500 MG/ML
2 VIAL (ML) INJECTION ONCE
Qty: 0 | Refills: 0 | Status: COMPLETED | OUTPATIENT
Start: 2018-07-27 | End: 2018-07-27

## 2018-07-27 RX ORDER — POTASSIUM CHLORIDE 20 MEQ
40 PACKET (EA) ORAL ONCE
Qty: 0 | Refills: 0 | Status: COMPLETED | OUTPATIENT
Start: 2018-07-27 | End: 2018-07-27

## 2018-07-27 RX ADMIN — OXYCODONE AND ACETAMINOPHEN 1 TABLET(S): 5; 325 TABLET ORAL at 06:37

## 2018-07-27 RX ADMIN — HEPARIN SODIUM 5000 UNIT(S): 5000 INJECTION INTRAVENOUS; SUBCUTANEOUS at 21:16

## 2018-07-27 RX ADMIN — OXYCODONE AND ACETAMINOPHEN 2 TABLET(S): 5; 325 TABLET ORAL at 22:10

## 2018-07-27 RX ADMIN — OXYCODONE AND ACETAMINOPHEN 2 TABLET(S): 5; 325 TABLET ORAL at 21:16

## 2018-07-27 RX ADMIN — Medication 40 MILLIEQUIVALENT(S): at 10:54

## 2018-07-27 RX ADMIN — OXYCODONE AND ACETAMINOPHEN 2 TABLET(S): 5; 325 TABLET ORAL at 12:30

## 2018-07-27 RX ADMIN — OXYCODONE AND ACETAMINOPHEN 2 TABLET(S): 5; 325 TABLET ORAL at 11:13

## 2018-07-27 RX ADMIN — Medication 50 GRAM(S): at 10:54

## 2018-07-27 RX ADMIN — HEPARIN SODIUM 5000 UNIT(S): 5000 INJECTION INTRAVENOUS; SUBCUTANEOUS at 05:35

## 2018-07-27 RX ADMIN — OXYCODONE AND ACETAMINOPHEN 1 TABLET(S): 5; 325 TABLET ORAL at 05:37

## 2018-07-27 RX ADMIN — PANTOPRAZOLE SODIUM 40 MILLIGRAM(S): 20 TABLET, DELAYED RELEASE ORAL at 05:35

## 2018-07-27 RX ADMIN — HEPARIN SODIUM 5000 UNIT(S): 5000 INJECTION INTRAVENOUS; SUBCUTANEOUS at 13:35

## 2018-07-27 NOTE — PHYSICAL THERAPY INITIAL EVALUATION ADULT - CRITERIA FOR SKILLED THERAPEUTIC INTERVENTIONS
anticipated discharge recommendation/functional limitations in following categories/risk reduction/prevention/anticipated equipment needs at discharge/rehab potential/impairments found/therapy frequency

## 2018-07-27 NOTE — PHYSICAL THERAPY INITIAL EVALUATION ADULT - IMPAIRED TRANSFERS: SIT/STAND, REHAB EVAL
decreased flexibility/c/o mild stiffness in the abd area, no pain; good static and dynamic standing balance, no LOBs noted

## 2018-07-27 NOTE — PHYSICAL THERAPY INITIAL EVALUATION ADULT - THERAPY FREQUENCY, PT EVAL
2-3x/week/Patient educated on frequency of inpatient therapy at [facility], patient verbalized understanding.

## 2018-07-27 NOTE — PHYSICAL THERAPY INITIAL EVALUATION ADULT - GENERAL OBSERVATIONS, REHAB EVAL
Chart reviewed. IE completed. RN Juanita cleared pt for treatment. Pt received semi-supine, NAD,+(R)IV. Pt c/o no pain, agreeable to PT. Chart reviewed. IE completed. RN Tahisa cleared pt for treatment. Pt received semi-supine, NAD,+(R)IV, +abd incision C/D/I, +(R) abd CHEY drain. Pt c/o no pain, agreeable to PT.

## 2018-07-27 NOTE — PHYSICAL THERAPY INITIAL EVALUATION ADULT - PERTINENT HX OF CURRENT PROBLEM, REHAB EVAL
Patient is an otherwise healthy 57 year old female who was recently diagnosed with metastatic colon cancer (liver, lung and bone lesions) with the primary tumor in her proximal sigmoid colon/distal descending colon. Please refer to H&P on [place] for additional information.

## 2018-07-27 NOTE — PHYSICAL THERAPY INITIAL EVALUATION ADULT - IMPAIRED TRANSFERS: TOILET, REHAB EVAL
required to hold on to the wall rail and lean on the sink when performing hand hygiene in standing, no LOBs throughout, required assist with the gown upon lowering on the toilet seat/decreased strength/decreased flexibility

## 2018-07-27 NOTE — PHYSICAL THERAPY INITIAL EVALUATION ADULT - LEVEL OF INDEPENDENCE: SIT/SUPINE, REHAB EVAL
supervision/able to bring B/L LEs onto bed surface HOB 60 degrees, able to bring B/L LEs onto bed surface/supervision

## 2018-07-27 NOTE — PHYSICAL THERAPY INITIAL EVALUATION ADULT - ADDITIONAL COMMENTS
Pt was independent in ambulation and all ADLs/ADLs prior to admission, worked part time as  in school. Pt lives with a son in an elevator building with 4 steps to enter. Son is available to assist during the day however works at night. Daughter is available to assist when needed. Pt denies use of any AD prior to admission.

## 2018-07-27 NOTE — PHYSICAL THERAPY INITIAL EVALUATION ADULT - BED MOBILITY LIMITATIONS, REHAB EVAL
decreased ability to use legs for bridging/pushing/decreased ability to use arms for pushing/pulling/impaired ability to control trunk for mobility impaired ability to control trunk for mobility/20 degrees trunk flexion maintained throughout/decreased ability to use arms for pushing/pulling/decreased ability to use legs for bridging/pushing

## 2018-07-27 NOTE — PHYSICAL THERAPY INITIAL EVALUATION ADULT - ASR EQUIP NEEDS DISCH PT EVAL
RW ordered through [facility] with Aleks Art RW ordered through [facility] with Aleks

## 2018-07-27 NOTE — PHYSICAL THERAPY INITIAL EVALUATION ADULT - GROSSLY INTACT, SENSORY
Left UE/Grossly Intact/Right UE/Right LE/Head/Neck/Trunk/Left LE/except for neuropathy in the fingers and toes 2/2 chemo meds

## 2018-07-27 NOTE — PROGRESS NOTE ADULT - SUBJECTIVE AND OBJECTIVE BOX
Progress note:    S: Malina diet, flatus 2 days ago, no BM yet, pain controlled    O:     AVSS  UOP OK    PE:   General: NAD, resting comfortably in bed  Pulm: Nonlabored breathing, no respiratory distress  Abd: soft, NT/ND. incision C/D/I, staples in place  Extrem: WWP, no edema, SCDs in place

## 2018-07-27 NOTE — PHYSICAL THERAPY INITIAL EVALUATION ADULT - DID THE PATIENT HAVE SURGERY?
Bilateral salpingo-oophorectomy, Diagnostic laparoscopy, Exploratory laparotomy, Take-down of colostomy, Sigmoidectomy, Ureterolysis with omental wrap, Cystoscopic insertion of ureteral stent, Parastomal herniorrhaphy, Umbilical herniorrhaphy/yes

## 2018-07-27 NOTE — PROGRESS NOTE ADULT - ASSESSMENT
57F with metastatic colon cancer s/p neoadjuvant chemotherapy and diverting loop colostomy presents for elective resection of the primary tumor and repair of parastomal hernia. Awaiting return of bowel fxn, otherwise pt recovering well.    Plan:  PO pain meds  CLD, awaiting bowel fxn return to advance  IVF  Protonix for acid reflux  SQH, SCDs

## 2018-07-27 NOTE — PROGRESS NOTE ADULT - SUBJECTIVE AND OBJECTIVE BOX
tolerating clears   no further bowel function  AVSS  ABD: soft  incision: clean    Continue clears  OOB

## 2018-07-28 LAB
ANION GAP SERPL CALC-SCNC: 10 MMOL/L — SIGNIFICANT CHANGE UP (ref 5–17)
BUN SERPL-MCNC: 7 MG/DL — SIGNIFICANT CHANGE UP (ref 7–23)
CALCIUM SERPL-MCNC: 8.8 MG/DL — SIGNIFICANT CHANGE UP (ref 8.4–10.5)
CHLORIDE SERPL-SCNC: 106 MMOL/L — SIGNIFICANT CHANGE UP (ref 96–108)
CO2 SERPL-SCNC: 27 MMOL/L — SIGNIFICANT CHANGE UP (ref 22–31)
CREAT SERPL-MCNC: 1.03 MG/DL — SIGNIFICANT CHANGE UP (ref 0.5–1.3)
GLUCOSE SERPL-MCNC: 93 MG/DL — SIGNIFICANT CHANGE UP (ref 70–99)
HCT VFR BLD CALC: 27.5 % — LOW (ref 34.5–45)
HGB BLD-MCNC: 8.7 G/DL — LOW (ref 11.5–15.5)
MAGNESIUM SERPL-MCNC: 2.1 MG/DL — SIGNIFICANT CHANGE UP (ref 1.6–2.6)
MCHC RBC-ENTMCNC: 27.5 PG — SIGNIFICANT CHANGE UP (ref 27–34)
MCHC RBC-ENTMCNC: 31.6 G/DL — LOW (ref 32–36)
MCV RBC AUTO: 87 FL — SIGNIFICANT CHANGE UP (ref 80–100)
PHOSPHATE SERPL-MCNC: 2.6 MG/DL — SIGNIFICANT CHANGE UP (ref 2.5–4.5)
PLATELET # BLD AUTO: 223 K/UL — SIGNIFICANT CHANGE UP (ref 150–400)
POTASSIUM SERPL-MCNC: 4.1 MMOL/L — SIGNIFICANT CHANGE UP (ref 3.5–5.3)
POTASSIUM SERPL-SCNC: 4.1 MMOL/L — SIGNIFICANT CHANGE UP (ref 3.5–5.3)
RBC # BLD: 3.16 M/UL — LOW (ref 3.8–5.2)
RBC # FLD: 16.9 % — SIGNIFICANT CHANGE UP (ref 10.3–16.9)
SODIUM SERPL-SCNC: 143 MMOL/L — SIGNIFICANT CHANGE UP (ref 135–145)
WBC # BLD: 7.8 K/UL — SIGNIFICANT CHANGE UP (ref 3.8–10.5)
WBC # FLD AUTO: 7.8 K/UL — SIGNIFICANT CHANGE UP (ref 3.8–10.5)

## 2018-07-28 RX ORDER — AMLODIPINE BESYLATE 2.5 MG/1
5 TABLET ORAL DAILY
Qty: 0 | Refills: 0 | Status: DISCONTINUED | OUTPATIENT
Start: 2018-07-28 | End: 2018-07-29

## 2018-07-28 RX ADMIN — HEPARIN SODIUM 5000 UNIT(S): 5000 INJECTION INTRAVENOUS; SUBCUTANEOUS at 22:00

## 2018-07-28 RX ADMIN — AMLODIPINE BESYLATE 5 MILLIGRAM(S): 2.5 TABLET ORAL at 10:54

## 2018-07-28 RX ADMIN — HEPARIN SODIUM 5000 UNIT(S): 5000 INJECTION INTRAVENOUS; SUBCUTANEOUS at 05:23

## 2018-07-28 RX ADMIN — PANTOPRAZOLE SODIUM 40 MILLIGRAM(S): 20 TABLET, DELAYED RELEASE ORAL at 06:31

## 2018-07-28 RX ADMIN — OXYCODONE AND ACETAMINOPHEN 2 TABLET(S): 5; 325 TABLET ORAL at 16:48

## 2018-07-28 RX ADMIN — OXYCODONE AND ACETAMINOPHEN 2 TABLET(S): 5; 325 TABLET ORAL at 17:50

## 2018-07-28 RX ADMIN — HEPARIN SODIUM 5000 UNIT(S): 5000 INJECTION INTRAVENOUS; SUBCUTANEOUS at 14:21

## 2018-07-28 NOTE — PROGRESS NOTE ADULT - SUBJECTIVE AND OBJECTIVE BOX
ID: 57F with metastatic colon cancer s/p neoadjuvant chemotherapy and diverting loop colostomy presents for elective resection of the primary tumor and repair of parastomal hernia    24 Hour Events: D/C drain, no bowel function yet    SUBJECTIVE: Patient resting well and ambulating      OBJECTIVE:    Vital Signs:  Vital Signs Last 24 Hrs  T(C): 36.8 (28 Jul 2018 17:32), Max: 37 (28 Jul 2018 14:43)  T(F): 98.2 (28 Jul 2018 17:32), Max: 98.6 (28 Jul 2018 14:43)  HR: 99 (28 Jul 2018 17:32) (80 - 99)  BP: 155/85 (28 Jul 2018 17:32) (135/86 - 164/86)  BP(mean): --  RR: 17 (28 Jul 2018 17:32) (16 - 18)  SpO2: 99% (28 Jul 2018 17:32) (99% - 100%)    Physical Exam:  General: NAD  Pulmonary: Nonlabored breathing, no respiratory distress  Cardiovascular: No heaves/thrills  Abdominal: Soft, NT/ND, incision C/D/I, staples in place  Extremities: WWP, no clubbing/cyanosis/edema  Neuro: AAO x3, no focal deficits    Lines/Drains/Tubes: None    Ins and Outs:  I&O's Summary    27 Jul 2018 07:01  -  28 Jul 2018 07:00  --------------------------------------------------------  IN: 1890 mL / OUT: 1755 mL / NET: 135 mL    28 Jul 2018 07:01  -  28 Jul 2018 20:08  --------------------------------------------------------  IN: 570 mL / OUT: 600 mL / NET: -30 mL        Labs:                        8.7    7.8   )-----------( 223      ( 28 Jul 2018 07:38 )             27.5     07-28    143  |  106  |  7   ----------------------------<  93  4.1   |  27  |  1.03    Ca    8.8      28 Jul 2018 07:38  Phos  2.6     07-28  Mg     2.1     07-28          CAPILLARY BLOOD GLUCOSE            Radiology & Additional Studies:

## 2018-07-28 NOTE — PROGRESS NOTE ADULT - SUBJECTIVE AND OBJECTIVE BOX
Covering for Dr. Javier    Feels well, no flatus yet. Tolerating clears well  AFVSS  Abd soft, ND, incision clean  CHEY 5cc SSF    Await flatus, then start low residue diet.  OOB, IS encouraged.  d/c CHEY

## 2018-07-28 NOTE — PROGRESS NOTE ADULT - ASSESSMENT
A/P: 57F with metastatic colon cancer s/p neoadjuvant chemotherapy and diverting loop colostomy presents for elective resection of the primary tumor and repair of parastomal hernia.     Plan:  CLD  Protonix for acid reflux  Pain/nausea control  SQH

## 2018-07-29 ENCOUNTER — TRANSCRIPTION ENCOUNTER (OUTPATIENT)
Age: 58
End: 2018-07-29

## 2018-07-29 VITALS
RESPIRATION RATE: 17 BRPM | DIASTOLIC BLOOD PRESSURE: 84 MMHG | TEMPERATURE: 98 F | SYSTOLIC BLOOD PRESSURE: 153 MMHG | HEART RATE: 94 BPM | OXYGEN SATURATION: 99 %

## 2018-07-29 LAB
ANION GAP SERPL CALC-SCNC: 10 MMOL/L — SIGNIFICANT CHANGE UP (ref 5–17)
BUN SERPL-MCNC: 5 MG/DL — LOW (ref 7–23)
CALCIUM SERPL-MCNC: 9.4 MG/DL — SIGNIFICANT CHANGE UP (ref 8.4–10.5)
CHLORIDE SERPL-SCNC: 104 MMOL/L — SIGNIFICANT CHANGE UP (ref 96–108)
CO2 SERPL-SCNC: 27 MMOL/L — SIGNIFICANT CHANGE UP (ref 22–31)
CREAT SERPL-MCNC: 0.96 MG/DL — SIGNIFICANT CHANGE UP (ref 0.5–1.3)
GLUCOSE SERPL-MCNC: 88 MG/DL — SIGNIFICANT CHANGE UP (ref 70–99)
HCT VFR BLD CALC: 25.8 % — LOW (ref 34.5–45)
HGB BLD-MCNC: 8 G/DL — LOW (ref 11.5–15.5)
MAGNESIUM SERPL-MCNC: 2.1 MG/DL — SIGNIFICANT CHANGE UP (ref 1.6–2.6)
MCHC RBC-ENTMCNC: 26.9 PG — LOW (ref 27–34)
MCHC RBC-ENTMCNC: 31 G/DL — LOW (ref 32–36)
MCV RBC AUTO: 86.9 FL — SIGNIFICANT CHANGE UP (ref 80–100)
PHOSPHATE SERPL-MCNC: 2.8 MG/DL — SIGNIFICANT CHANGE UP (ref 2.5–4.5)
PLATELET # BLD AUTO: 236 K/UL — SIGNIFICANT CHANGE UP (ref 150–400)
POTASSIUM SERPL-MCNC: 4.1 MMOL/L — SIGNIFICANT CHANGE UP (ref 3.5–5.3)
POTASSIUM SERPL-SCNC: 4.1 MMOL/L — SIGNIFICANT CHANGE UP (ref 3.5–5.3)
RBC # BLD: 2.97 M/UL — LOW (ref 3.8–5.2)
RBC # FLD: 17 % — HIGH (ref 10.3–16.9)
SODIUM SERPL-SCNC: 141 MMOL/L — SIGNIFICANT CHANGE UP (ref 135–145)
WBC # BLD: 8.2 K/UL — SIGNIFICANT CHANGE UP (ref 3.8–10.5)
WBC # FLD AUTO: 8.2 K/UL — SIGNIFICANT CHANGE UP (ref 3.8–10.5)

## 2018-07-29 PROCEDURE — 86923 COMPATIBILITY TEST ELECTRIC: CPT

## 2018-07-29 PROCEDURE — 85018 HEMOGLOBIN: CPT

## 2018-07-29 PROCEDURE — 80048 BASIC METABOLIC PNL TOTAL CA: CPT

## 2018-07-29 PROCEDURE — 88309 TISSUE EXAM BY PATHOLOGIST: CPT

## 2018-07-29 PROCEDURE — 88307 TISSUE EXAM BY PATHOLOGIST: CPT

## 2018-07-29 PROCEDURE — 84295 ASSAY OF SERUM SODIUM: CPT

## 2018-07-29 PROCEDURE — 86901 BLOOD TYPING SEROLOGIC RH(D): CPT

## 2018-07-29 PROCEDURE — 97161 PT EVAL LOW COMPLEX 20 MIN: CPT

## 2018-07-29 PROCEDURE — 84100 ASSAY OF PHOSPHORUS: CPT

## 2018-07-29 PROCEDURE — 88305 TISSUE EXAM BY PATHOLOGIST: CPT

## 2018-07-29 PROCEDURE — 84132 ASSAY OF SERUM POTASSIUM: CPT

## 2018-07-29 PROCEDURE — 86900 BLOOD TYPING SEROLOGIC ABO: CPT

## 2018-07-29 PROCEDURE — 82962 GLUCOSE BLOOD TEST: CPT

## 2018-07-29 PROCEDURE — C2617: CPT

## 2018-07-29 PROCEDURE — P9016: CPT

## 2018-07-29 PROCEDURE — 82330 ASSAY OF CALCIUM: CPT

## 2018-07-29 PROCEDURE — 83735 ASSAY OF MAGNESIUM: CPT

## 2018-07-29 PROCEDURE — 86850 RBC ANTIBODY SCREEN: CPT

## 2018-07-29 PROCEDURE — 85027 COMPLETE CBC AUTOMATED: CPT

## 2018-07-29 PROCEDURE — C1769: CPT

## 2018-07-29 PROCEDURE — 88302 TISSUE EXAM BY PATHOLOGIST: CPT

## 2018-07-29 PROCEDURE — 36430 TRANSFUSION BLD/BLD COMPNT: CPT

## 2018-07-29 PROCEDURE — 88304 TISSUE EXAM BY PATHOLOGIST: CPT

## 2018-07-29 PROCEDURE — 74018 RADEX ABDOMEN 1 VIEW: CPT

## 2018-07-29 PROCEDURE — C1758: CPT

## 2018-07-29 PROCEDURE — 36415 COLL VENOUS BLD VENIPUNCTURE: CPT

## 2018-07-29 RX ORDER — SENNA PLUS 8.6 MG/1
1 TABLET ORAL DAILY
Qty: 0 | Refills: 0 | Status: DISCONTINUED | OUTPATIENT
Start: 2018-07-29 | End: 2018-07-29

## 2018-07-29 RX ORDER — DOCUSATE SODIUM 100 MG
100 CAPSULE ORAL DAILY
Qty: 0 | Refills: 0 | Status: DISCONTINUED | OUTPATIENT
Start: 2018-07-29 | End: 2018-07-29

## 2018-07-29 RX ADMIN — AMLODIPINE BESYLATE 5 MILLIGRAM(S): 2.5 TABLET ORAL at 05:20

## 2018-07-29 RX ADMIN — HEPARIN SODIUM 5000 UNIT(S): 5000 INJECTION INTRAVENOUS; SUBCUTANEOUS at 06:00

## 2018-07-29 RX ADMIN — PANTOPRAZOLE SODIUM 40 MILLIGRAM(S): 20 TABLET, DELAYED RELEASE ORAL at 06:08

## 2018-07-29 RX ADMIN — OXYCODONE AND ACETAMINOPHEN 1 TABLET(S): 5; 325 TABLET ORAL at 02:53

## 2018-07-29 RX ADMIN — Medication 100 MILLIGRAM(S): at 11:13

## 2018-07-29 RX ADMIN — OXYCODONE AND ACETAMINOPHEN 1 TABLET(S): 5; 325 TABLET ORAL at 03:53

## 2018-07-29 RX ADMIN — SENNA PLUS 1 TABLET(S): 8.6 TABLET ORAL at 11:13

## 2018-07-29 NOTE — PROGRESS NOTE ADULT - SUBJECTIVE AND OBJECTIVE BOX
Passing flatus.  Tolerated eggs this AM  AFVSS  Abd soft, ND, NT, Incision clean    d/c home later today  F/u with Dr. Javier later this week for staple removal  Reviewed postop care.

## 2018-07-29 NOTE — DISCHARGE NOTE ADULT - MEDICATION SUMMARY - MEDICATIONS TO TAKE
I will START or STAY ON the medications listed below when I get home from the hospital:    Percocet 5/325 oral tablet  -- 1 tab(s) by mouth every 4 hours MDD:6  -- Caution federal law prohibits the transfer of this drug to any person other  than the person for whom it was prescribed.  May cause drowsiness.  Alcohol may intensify this effect.  Use care when operating dangerous machinery.  This prescription cannot be refilled.  This product contains acetaminophen.  Do not use  with any other product containing acetaminophen to prevent possible liver damage.  Using more of this medication than prescribed may cause serious breathing problems.    -- Indication: For Pain    amLODIPine 5 mg oral tablet  -- 1 tab(s) by mouth once a day  -- Indication: For Home medication    omeprazole 40 mg oral delayed release capsule  -- 1 cap(s) by mouth once a day  -- Indication: For Home medication

## 2018-07-29 NOTE — PROGRESS NOTE ADULT - ASSESSMENT
57F with metastatic colon cancer s/p neoadjuvant chemotherapy and diverting loop colostomy POD6 s/p elective resection of the primary tumor and repair of parastomal hernia. Awaiting return of bowel fxn, otherwise pt recovering well.     d/c home today  PO percocet  F/u with Dr. Javier next week for staple removal

## 2018-07-29 NOTE — DISCHARGE NOTE ADULT - HOSPITAL COURSE
Patient is an otherwise healthy 57 year old female who was recently diagnosed with metastatic colon cancer (liver, lung and bone lesions) with the primary tumor in her proximal sigmoid colon/distal descending colon. She underwent neoadjuvant chemotherapy with Xeloda and Avastin with good response seen on follow up CT/PET scan. Chemotherapy was last administered in May 2018. Of note, she had a diverting loop colostomy done in 2017 due to obstruction of the bowel by the tumor, and also had a left ureteral obstruction which was treated with a percutaneous nephrostomy, which was subsequently removed after follow up imaging demonstrated resolution of the obstruction. Today she presents for resection of the primary tumor and repair of a large parastomal hernia.  PMH:   Colon cancer    High cholesterol    HTN (hypertension)    Neuropathy due to chemotherapeutic drug  PSH  Colostomy in place    H/O  section    H/O nephrostomy    : Diagnostic laparoscopy, exploratory laparotomy, take-down of colostomy, sigmoidectomy, ureterolysis with omental wrap, cystoscopic insertion of ureteral stent, parastomal herniorrhaphy, umbilical herniorrhaphy, bilateral salpingo-oophorectomy Ms. Hebert is an otherwise healthy 57 year old female who was recently diagnosed with metastatic colon cancer (liver, lung and bone lesions) with the primary tumor in her proximal sigmoid colon/distal descending colon. She underwent neoadjuvant chemotherapy with Xeloda and Avastin with good response seen on follow up CT/PET scan. Chemotherapy was last administered in May 2018. Of note, she had a diverting loop colostomy done in September 2017 due to obstruction of the bowel by the tumor, and also had a left ureteral obstruction which was treated with a percutaneous nephrostomy, which was subsequently removed after follow up imaging demonstrated resolution of the obstruction. She presented on 7/23 for resection of the primary tumor and repair of a large parastomal hernia. On 7/23 she underwent a diagnostic laparoscopy, exploratory laparotomy, take-down of colostomy, sigmoidectomy, ureterolysis with omental wrap, cystoscopic insertion of ureteral stent, parastomal herniorrhaphy, umbilical herniorrhaphy, bilateral salpingo-oophorectomy. She tolerated the procedure well. Her postoperative course was unremarkable with advancement of diet, passing trial of void, and pain control. On day of discharge patient was stable to be d/c'd home.

## 2018-07-29 NOTE — DISCHARGE NOTE ADULT - CARE PROVIDER_API CALL
Lisandro Javier), ColonRectal Surgery  [address]  [address]  [place] [address]  Phone: [phone]  Fax: [phone]    Jose Manning), Urology  [address] [place] [address]  Phone: [phone]  Fax: [phone]

## 2018-07-29 NOTE — PROGRESS NOTE ADULT - PROVIDER SPECIALTY LIST ADULT
Colorectal Surgery
Surgery

## 2018-07-29 NOTE — DISCHARGE NOTE ADULT - PATIENT PORTAL LINK FT
You can access the [facility] Patient Portal, offered by [facility], by registering with the following website: [email or web address]

## 2018-07-29 NOTE — DISCHARGE NOTE ADULT - PLAN OF CARE
Recovery Please follow up with Dr. Javier this week for removal of your staples. Call his office to schedule an appointment: [phone].    Please follow-up in 2 weeks with Dr. Manning from Urology for your Ureteral stents. Call his office to schedule [phone].    Please take your pain medication every 4 hours as needed for pain.     General Discharge Instructions:  Please resume all regular home medications unless specifically advised not to take a particular medication. Also, please take any new medications as prescribed.  Please get plenty of rest, continue to ambulate several times per day, and drink adequate amounts of fluids. Avoid lifting weights greater than 5-10 lbs until you follow-up with your surgeon, who will instruct you further regarding activity restrictions.  Avoid driving or operating heavy machinery while taking pain medications.  Please follow-up with your surgeon and Primary Care Provider (PCP) as advised.  Incision Care:  *Please call your doctor or nurse practitioner if you have increased pain, swelling, redness, or drainage from the incision site.  *Avoid swimming and baths until your follow-up appointment.  *You may shower, and wash surgical incisions with a mild soap and warm water. Gently pat the area dry.  *If you have staples, they will be removed at your follow-up appointment.  *If you have steri-strips, they will fall off on their own. Please remove any remaining strips 7-10 days after surgery.

## 2018-07-29 NOTE — PROGRESS NOTE ADULT - SUBJECTIVE AND OBJECTIVE BOX
S: Patients states she feels much better. She is tolerating her diet and denies nausea/vomiting. Her pain is well controlled. She admits to passing flatus, but denies BM.     O:     Vital Signs Last 24 Hrs  T(C): 36.9 (29 Jul 2018 09:04), Max: 37.1 (28 Jul 2018 20:45)  T(F): 98.4 (29 Jul 2018 09:04), Max: 98.8 (28 Jul 2018 20:45)  HR: 87 (29 Jul 2018 09:04) (70 - 99)  BP: 157/90 (29 Jul 2018 09:04) (124/74 - 160/90)  BP(mean): --  RR: 16 (29 Jul 2018 09:04) (16 - 18)  SpO2: 99% (29 Jul 2018 09:04) (95% - 100%)  I&O's Summary    28 Jul 2018 07:01  -  29 Jul 2018 07:00  --------------------------------------------------------  IN: 570 mL / OUT: 1600 mL / NET: -1030 mL    29 Jul 2018 07:01  -  29 Jul 2018 11:16  --------------------------------------------------------  IN: 120 mL / OUT: 0 mL / NET: 120 mL        PE:   General: NAD, resting comfortably in bed  Pulm: Nonlabored breathing, no respiratory distress  Abd: soft, NT/ND. incision C/D/I, staples in place  Extrem: WWP, no edema, SCDs in place  Passing flatus.                            8.0    8.2   )-----------( 236      ( 29 Jul 2018 07:58 )             25.8   07-29    141  |  104  |  5<L>  ----------------------------<  88  4.1   |  27  |  0.96    Ca    9.4      29 Jul 2018 07:58  Phos  2.8     07-29  Mg     2.1     07-29

## 2018-07-29 NOTE — DISCHARGE NOTE ADULT - CARE PLAN
Principal Discharge DX:	Metastatic colon cancer in female  Goal:	Recovery  Assessment and plan of treatment:	Please follow up with Dr. Javier this week for removal of your staples. Call his office to schedule an appointment: [phone].    Please follow-up in 2 weeks with Dr. Manning from Urology for your Ureteral stents. Call his office to schedule [phone].    Please take your pain medication every 4 hours as needed for pain.     General Discharge Instructions:  Please resume all regular home medications unless specifically advised not to take a particular medication. Also, please take any new medications as prescribed.  Please get plenty of rest, continue to ambulate several times per day, and drink adequate amounts of fluids. Avoid lifting weights greater than 5-10 lbs until you follow-up with your surgeon, who will instruct you further regarding activity restrictions.  Avoid driving or operating heavy machinery while taking pain medications.  Please follow-up with your surgeon and Primary Care Provider (PCP) as advised.  Incision Care:  *Please call your doctor or nurse practitioner if you have increased pain, swelling, redness, or drainage from the incision site.  *Avoid swimming and baths until your follow-up appointment.  *You may shower, and wash surgical incisions with a mild soap and warm water. Gently pat the area dry.  *If you have staples, they will be removed at your follow-up appointment.  *If you have steri-strips, they will fall off on their own. Please remove any remaining strips 7-10 days after surgery.

## 2018-07-30 LAB — SURGICAL PATHOLOGY STUDY: SIGNIFICANT CHANGE UP

## 2018-08-03 DIAGNOSIS — C78.00 SECONDARY MALIGNANT NEOPLASM OF UNSPECIFIED LUNG: ICD-10-CM

## 2018-08-03 DIAGNOSIS — C79.62 SECONDARY MALIGNANT NEOPLASM OF LEFT OVARY: ICD-10-CM

## 2018-08-03 DIAGNOSIS — C79.51 SECONDARY MALIGNANT NEOPLASM OF BONE: ICD-10-CM

## 2018-08-03 DIAGNOSIS — C18.7 MALIGNANT NEOPLASM OF SIGMOID COLON: ICD-10-CM

## 2018-08-03 DIAGNOSIS — C18.9 MALIGNANT NEOPLASM OF COLON, UNSPECIFIED: ICD-10-CM

## 2018-08-03 DIAGNOSIS — I10 ESSENTIAL (PRIMARY) HYPERTENSION: ICD-10-CM

## 2018-08-03 DIAGNOSIS — N13.30 UNSPECIFIED HYDRONEPHROSIS: ICD-10-CM

## 2018-08-03 DIAGNOSIS — E43 UNSPECIFIED SEVERE PROTEIN-CALORIE MALNUTRITION: ICD-10-CM

## 2018-08-03 DIAGNOSIS — G62.0 DRUG-INDUCED POLYNEUROPATHY: ICD-10-CM

## 2018-08-03 DIAGNOSIS — K43.5 PARASTOMAL HERNIA WITHOUT OBSTRUCTION OR GANGRENE: ICD-10-CM

## 2018-08-03 DIAGNOSIS — E78.00 PURE HYPERCHOLESTEROLEMIA, UNSPECIFIED: ICD-10-CM

## 2018-08-03 DIAGNOSIS — T45.1X5A ADVERSE EFFECT OF ANTINEOPLASTIC AND IMMUNOSUPPRESSIVE DRUGS, INITIAL ENCOUNTER: ICD-10-CM

## 2018-08-03 DIAGNOSIS — Z93.3 COLOSTOMY STATUS: ICD-10-CM

## 2018-08-03 DIAGNOSIS — C78.7 SECONDARY MALIGNANT NEOPLASM OF LIVER AND INTRAHEPATIC BILE DUCT: ICD-10-CM

## 2018-08-16 ENCOUNTER — APPOINTMENT (OUTPATIENT)
Dept: INFUSION THERAPY | Facility: HOSPITAL | Age: 58
End: 2018-08-16

## 2018-08-16 PROBLEM — G62.0 DRUG-INDUCED POLYNEUROPATHY: Chronic | Status: ACTIVE | Noted: 2018-07-23

## 2018-09-06 ENCOUNTER — APPOINTMENT (OUTPATIENT)
Dept: INFUSION THERAPY | Facility: HOSPITAL | Age: 58
End: 2018-09-06

## 2018-09-13 ENCOUNTER — APPOINTMENT (OUTPATIENT)
Dept: INFUSION THERAPY | Facility: HOSPITAL | Age: 58
End: 2018-09-13

## 2018-09-13 ENCOUNTER — OUTPATIENT (OUTPATIENT)
Dept: OUTPATIENT SERVICES | Facility: HOSPITAL | Age: 58
LOS: 1 days | End: 2018-09-13
Payer: COMMERCIAL

## 2018-09-13 VITALS
DIASTOLIC BLOOD PRESSURE: 86 MMHG | SYSTOLIC BLOOD PRESSURE: 159 MMHG | HEIGHT: 61 IN | OXYGEN SATURATION: 100 % | HEART RATE: 72 BPM | RESPIRATION RATE: 18 BRPM | TEMPERATURE: 97 F | WEIGHT: 141.98 LBS

## 2018-09-13 VITALS
TEMPERATURE: 97 F | HEART RATE: 70 BPM | SYSTOLIC BLOOD PRESSURE: 159 MMHG | DIASTOLIC BLOOD PRESSURE: 89 MMHG | OXYGEN SATURATION: 99 % | RESPIRATION RATE: 18 BRPM

## 2018-09-13 DIAGNOSIS — Z98.891 HISTORY OF UTERINE SCAR FROM PREVIOUS SURGERY: Chronic | ICD-10-CM

## 2018-09-13 DIAGNOSIS — Z93.3 COLOSTOMY STATUS: Chronic | ICD-10-CM

## 2018-09-13 DIAGNOSIS — Z93.6 OTHER ARTIFICIAL OPENINGS OF URINARY TRACT STATUS: Chronic | ICD-10-CM

## 2018-09-13 DIAGNOSIS — C18.9 MALIGNANT NEOPLASM OF COLON, UNSPECIFIED: ICD-10-CM

## 2018-09-13 PROCEDURE — 96413 CHEMO IV INFUSION 1 HR: CPT

## 2018-09-13 RX ORDER — BEVACIZUMAB 400 MG/16ML
484 INJECTION, SOLUTION INTRAVENOUS ONCE
Qty: 0 | Refills: 0 | Status: COMPLETED | OUTPATIENT
Start: 2018-09-13 | End: 2018-09-13

## 2018-09-13 RX ADMIN — Medication 300 UNIT(S): at 13:15

## 2018-09-13 RX ADMIN — BEVACIZUMAB 238.72 MILLIGRAM(S): 400 INJECTION, SOLUTION INTRAVENOUS at 12:40

## 2018-10-04 ENCOUNTER — OUTPATIENT (OUTPATIENT)
Dept: OUTPATIENT SERVICES | Facility: HOSPITAL | Age: 58
LOS: 1 days | End: 2018-10-04
Payer: COMMERCIAL

## 2018-10-04 ENCOUNTER — APPOINTMENT (OUTPATIENT)
Dept: INFUSION THERAPY | Facility: HOSPITAL | Age: 58
End: 2018-10-04

## 2018-10-04 VITALS
DIASTOLIC BLOOD PRESSURE: 85 MMHG | HEIGHT: 60.1 IN | HEART RATE: 82 BPM | WEIGHT: 141.98 LBS | RESPIRATION RATE: 18 BRPM | SYSTOLIC BLOOD PRESSURE: 156 MMHG | OXYGEN SATURATION: 100 % | TEMPERATURE: 97 F

## 2018-10-04 DIAGNOSIS — Z98.891 HISTORY OF UTERINE SCAR FROM PREVIOUS SURGERY: Chronic | ICD-10-CM

## 2018-10-04 DIAGNOSIS — Z93.6 OTHER ARTIFICIAL OPENINGS OF URINARY TRACT STATUS: Chronic | ICD-10-CM

## 2018-10-04 DIAGNOSIS — C18.9 MALIGNANT NEOPLASM OF COLON, UNSPECIFIED: ICD-10-CM

## 2018-10-04 DIAGNOSIS — Z93.3 COLOSTOMY STATUS: Chronic | ICD-10-CM

## 2018-10-04 PROCEDURE — 96413 CHEMO IV INFUSION 1 HR: CPT

## 2018-10-04 RX ORDER — BEVACIZUMAB 400 MG/16ML
477 INJECTION, SOLUTION INTRAVENOUS ONCE
Qty: 0 | Refills: 0 | Status: COMPLETED | OUTPATIENT
Start: 2018-10-04 | End: 2018-10-04

## 2018-10-04 RX ORDER — BEVACIZUMAB 400 MG/16ML
484 INJECTION, SOLUTION INTRAVENOUS ONCE
Qty: 0 | Refills: 0 | Status: DISCONTINUED | OUTPATIENT
Start: 2018-10-04 | End: 2018-10-04

## 2018-10-04 RX ADMIN — BEVACIZUMAB 238.16 MILLIGRAM(S): 400 INJECTION, SOLUTION INTRAVENOUS at 09:58

## 2018-10-04 RX ADMIN — Medication 300 UNIT(S): at 10:06

## 2018-10-24 RX ORDER — ATROPINE SULFATE 0.1 MG/ML
1 SYRINGE (ML) INJECTION ONCE
Qty: 0 | Refills: 0 | Status: DISCONTINUED | OUTPATIENT
Start: 2018-10-25 | End: 2018-10-25

## 2018-10-25 ENCOUNTER — APPOINTMENT (OUTPATIENT)
Dept: INFUSION THERAPY | Facility: HOSPITAL | Age: 58
End: 2018-10-25

## 2018-10-25 ENCOUNTER — OUTPATIENT (OUTPATIENT)
Dept: OUTPATIENT SERVICES | Facility: HOSPITAL | Age: 58
LOS: 1 days | End: 2018-10-25
Payer: COMMERCIAL

## 2018-10-25 VITALS
OXYGEN SATURATION: 100 % | SYSTOLIC BLOOD PRESSURE: 140 MMHG | HEART RATE: 80 BPM | TEMPERATURE: 97 F | DIASTOLIC BLOOD PRESSURE: 81 MMHG | RESPIRATION RATE: 20 BRPM

## 2018-10-25 DIAGNOSIS — Z98.891 HISTORY OF UTERINE SCAR FROM PREVIOUS SURGERY: Chronic | ICD-10-CM

## 2018-10-25 DIAGNOSIS — Z93.6 OTHER ARTIFICIAL OPENINGS OF URINARY TRACT STATUS: Chronic | ICD-10-CM

## 2018-10-25 DIAGNOSIS — Z93.3 COLOSTOMY STATUS: Chronic | ICD-10-CM

## 2018-10-25 PROCEDURE — 96417 CHEMO IV INFUS EACH ADDL SEQ: CPT

## 2018-10-25 PROCEDURE — 96413 CHEMO IV INFUSION 1 HR: CPT

## 2018-10-25 RX ORDER — ATROPINE SULFATE 0.1 MG/ML
1 SYRINGE (ML) INJECTION ONCE
Qty: 0 | Refills: 0 | Status: DISCONTINUED | OUTPATIENT
Start: 2018-10-25 | End: 2018-10-25

## 2018-10-25 RX ORDER — ONDANSETRON 8 MG/1
16 TABLET, FILM COATED ORAL ONCE
Qty: 0 | Refills: 0 | Status: COMPLETED | OUTPATIENT
Start: 2018-10-25 | End: 2018-10-25

## 2018-10-25 RX ORDER — DEXAMETHASONE 0.5 MG/5ML
20 ELIXIR ORAL ONCE
Qty: 0 | Refills: 0 | Status: COMPLETED | OUTPATIENT
Start: 2018-10-25 | End: 2018-10-25

## 2018-10-25 RX ORDER — BEVACIZUMAB 400 MG/16ML
487 INJECTION, SOLUTION INTRAVENOUS ONCE
Qty: 0 | Refills: 0 | Status: COMPLETED | OUTPATIENT
Start: 2018-10-25 | End: 2018-10-25

## 2018-10-25 RX ORDER — ATROPINE SULFATE 0.1 MG/ML
1 SYRINGE (ML) INJECTION ONCE
Qty: 0 | Refills: 0 | Status: COMPLETED | OUTPATIENT
Start: 2018-10-25 | End: 2018-10-25

## 2018-10-25 RX ORDER — IRINOTECAN HYDROCHLORIDE 100 MG/5ML
334 INJECTION, SOLUTION INTRAVENOUS ONCE
Qty: 0 | Refills: 0 | Status: COMPLETED | OUTPATIENT
Start: 2018-10-25 | End: 2018-10-25

## 2018-10-25 RX ADMIN — BEVACIZUMAB 238.96 MILLIGRAM(S): 400 INJECTION, SOLUTION INTRAVENOUS at 08:52

## 2018-10-25 RX ADMIN — IRINOTECAN HYDROCHLORIDE 177.8 MILLIGRAM(S): 100 INJECTION, SOLUTION INTRAVENOUS at 08:53

## 2018-10-25 RX ADMIN — Medication 1 MILLIGRAM(S): at 09:17

## 2018-10-25 RX ADMIN — Medication 220 MILLIGRAM(S): at 08:37

## 2018-10-25 RX ADMIN — ONDANSETRON 232 MILLIGRAM(S): 8 TABLET, FILM COATED ORAL at 08:37

## 2018-11-15 ENCOUNTER — OUTPATIENT (OUTPATIENT)
Dept: OUTPATIENT SERVICES | Facility: HOSPITAL | Age: 58
LOS: 1 days | End: 2018-11-15
Payer: COMMERCIAL

## 2018-11-15 ENCOUNTER — APPOINTMENT (OUTPATIENT)
Dept: INFUSION THERAPY | Facility: HOSPITAL | Age: 58
End: 2018-11-15

## 2018-11-15 VITALS
HEART RATE: 80 BPM | SYSTOLIC BLOOD PRESSURE: 142 MMHG | WEIGHT: 141.98 LBS | RESPIRATION RATE: 16 BRPM | HEIGHT: 61 IN | TEMPERATURE: 98 F | OXYGEN SATURATION: 99 % | DIASTOLIC BLOOD PRESSURE: 82 MMHG

## 2018-11-15 VITALS
OXYGEN SATURATION: 99 % | RESPIRATION RATE: 16 BRPM | TEMPERATURE: 97 F | SYSTOLIC BLOOD PRESSURE: 130 MMHG | HEART RATE: 76 BPM | DIASTOLIC BLOOD PRESSURE: 76 MMHG

## 2018-11-15 DIAGNOSIS — Z93.3 COLOSTOMY STATUS: Chronic | ICD-10-CM

## 2018-11-15 DIAGNOSIS — Z98.891 HISTORY OF UTERINE SCAR FROM PREVIOUS SURGERY: Chronic | ICD-10-CM

## 2018-11-15 DIAGNOSIS — C18.9 MALIGNANT NEOPLASM OF COLON, UNSPECIFIED: ICD-10-CM

## 2018-11-15 DIAGNOSIS — Z93.6 OTHER ARTIFICIAL OPENINGS OF URINARY TRACT STATUS: Chronic | ICD-10-CM

## 2018-11-15 PROCEDURE — 96417 CHEMO IV INFUS EACH ADDL SEQ: CPT

## 2018-11-15 PROCEDURE — 96413 CHEMO IV INFUSION 1 HR: CPT

## 2018-11-15 PROCEDURE — 96523 IRRIG DRUG DELIVERY DEVICE: CPT

## 2018-11-15 PROCEDURE — 96375 TX/PRO/DX INJ NEW DRUG ADDON: CPT

## 2018-11-15 RX ORDER — BEVACIZUMAB 400 MG/16ML
487 INJECTION, SOLUTION INTRAVENOUS ONCE
Qty: 0 | Refills: 0 | Status: COMPLETED | OUTPATIENT
Start: 2018-11-15 | End: 2018-11-15

## 2018-11-15 RX ORDER — ONDANSETRON 8 MG/1
16 TABLET, FILM COATED ORAL ONCE
Qty: 0 | Refills: 0 | Status: COMPLETED | OUTPATIENT
Start: 2018-11-15 | End: 2018-11-15

## 2018-11-15 RX ORDER — DEXAMETHASONE 0.5 MG/5ML
20 ELIXIR ORAL ONCE
Qty: 0 | Refills: 0 | Status: COMPLETED | OUTPATIENT
Start: 2018-11-15 | End: 2018-11-15

## 2018-11-15 RX ORDER — IRINOTECAN HYDROCHLORIDE 100 MG/5ML
334 INJECTION, SOLUTION INTRAVENOUS ONCE
Qty: 0 | Refills: 0 | Status: COMPLETED | OUTPATIENT
Start: 2018-11-15 | End: 2018-11-15

## 2018-11-15 RX ADMIN — Medication 300 UNIT(S): at 11:38

## 2018-11-15 RX ADMIN — ONDANSETRON 232 MILLIGRAM(S): 8 TABLET, FILM COATED ORAL at 08:31

## 2018-11-15 RX ADMIN — BEVACIZUMAB 238.96 MILLIGRAM(S): 400 INJECTION, SOLUTION INTRAVENOUS at 09:16

## 2018-11-15 RX ADMIN — Medication 208 MILLIGRAM(S): at 08:32

## 2018-11-15 RX ADMIN — IRINOTECAN HYDROCHLORIDE 177.8 MILLIGRAM(S): 100 INJECTION, SOLUTION INTRAVENOUS at 09:16

## 2018-11-23 NOTE — CHART NOTE - NSCHARTNOTEFT_GEN_A_CORE
Admitting Diagnosis:   57y Female with a malignant near large bowel obstruction now s/p loop colostomy creation pod1        PAST MEDICAL & SURGICAL HISTORY:  High cholesterol  HTN (hypertension)  Colon cancer  H/O  section      Current Nutrition Order:   Diet, DASH/TLC:   Sodium & Cholesterol Restricted (09-15-17 @ 08:58)      PO Intake: Good (%) [   ]  Fair (50-75%) [   ] Poor (<25%) [   ] Pt diet advanced for lunch today. Was tolerating ~25-50% of Clear Liquid Diet prior to advancement to solids     GI Issues: +ostomy function. Denies any n/v     Pain: Pain controlled, pt appears comfortable sitting upright in chair     Skin Integrity: Surgical incision     Labs:   09-15    140  |  102  |  10  ----------------------------<  118<H>  3.9   |  20<L>  |  1.22    Ca    9.1      15 Sep 2017 06:24  Phos  3.4     -  Mg     1.6     09-15    TPro  6.9  /  Alb  3.1<L>  /  TBili  0.3  /  DBili  x   /  AST  13  /  ALT  9<L>  /  AlkPhos  86  -    CAPILLARY BLOOD GLUCOSE          Medications:  MEDICATIONS  (STANDING):  heparin  Injectable 5000 Unit(s) SubCutaneous every 8 hours  amLODIPine   Tablet 5 milliGRAM(s) Oral daily  simvastatin 40 milliGRAM(s) Oral at bedtime    MEDICATIONS  (PRN): dilaudid       Weight: 87.8 kg       Weight Change:  No new wt since admission; yet pot reports a 40 lb wt loss over the past 6 months (17%)      Estimated Energy Needs (30-35 calories/kg): (using IBW)  · Weight  (lbs)	105.1 lb  · Weight (kg)	47.7 kg  · From (30 laquita/kg)	1431  · To (35 calories/kg) 1669    Estimated Protein Needs (1.4-1.6 g/kg):  · Weight  (lbs)	105.1  · Weight (kg)	47.7 kg  · From (1.4 g/kg)	66.78  · To (1.6 g/kg)	76.32     Estimated Fluid Needs (30-35 ml/kg):  · Weight  (lbs)	105.1  · Weight (kg)	47.7  · From (30 ml/kg)	1431  · To (35 ml/kg)	1669    Subjective:  Pt s/p colostomy POD#1. Pt reports to have fair appetite, yet can only tolerating small amounts at a time. Provided colostomy MNT ad encouraged small, frequent nutrient dense meals.     Previous Nutrition Diagnosis:  Inadequate protein-energy intake RT inability to meet needs po 2/2 decreased intake 2/2 disease condition/obstructions  AEB pt has lost significant wt 17% (40#) x 6 months      Active [ X ]  Resolved [   ]    If resolved, new PES:     Goal: 1.) Pt to meet 75% of needs PO     Recommendations: Rec. Ensure Clear TID     Education: Provided colostomy MNT ad encouraged small, frequent nutrient dense meals.     Risk Level: High [ X  ] Moderate [   ] Low [   ]
Upon Nutritional Assessment by the Registered Dietitian your patient was determined to meet criteria / has evidence of the following diagnosis/diagnoses:          [ ]  Mild Protein Calorie Malnutrition        [ ]  Moderate Protein Calorie Malnutrition        [X] Severe Protein Calorie Malnutrition        [ ] Unspecified Protein Calorie Malnutrition        [ ] Underweight / BMI <19        [ ] Morbid Obesity / BMI > 40      Findings as based on:  •  Comprehensive nutrition assessment and consultation  •  Calorie counts (nutrient intake analysis)  •  Food acceptance and intake status from observations by staff  •  Follow up  •  Patient education  •  Intervention secondary to interdisciplinary rounds  •   concerns      Treatment:    The following diet has been recommended: Rec. Ensure Clear TID       PROVIDER Section:     By signing this assessment you are acknowledging and agree with the diagnosis/diagnoses assigned by the Registered Dietitian    Comments:
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

## 2018-12-07 ENCOUNTER — OUTPATIENT (OUTPATIENT)
Dept: OUTPATIENT SERVICES | Facility: HOSPITAL | Age: 58
LOS: 1 days | End: 2018-12-07
Payer: COMMERCIAL

## 2018-12-07 ENCOUNTER — APPOINTMENT (OUTPATIENT)
Dept: INFUSION THERAPY | Facility: HOSPITAL | Age: 58
End: 2018-12-07

## 2018-12-07 VITALS
WEIGHT: 141.98 LBS | HEIGHT: 61 IN | HEART RATE: 87 BPM | OXYGEN SATURATION: 99 % | SYSTOLIC BLOOD PRESSURE: 133 MMHG | DIASTOLIC BLOOD PRESSURE: 81 MMHG | RESPIRATION RATE: 16 BRPM | TEMPERATURE: 98 F

## 2018-12-07 VITALS
RESPIRATION RATE: 16 BRPM | DIASTOLIC BLOOD PRESSURE: 79 MMHG | SYSTOLIC BLOOD PRESSURE: 134 MMHG | TEMPERATURE: 98 F | OXYGEN SATURATION: 100 % | HEART RATE: 81 BPM

## 2018-12-07 DIAGNOSIS — C18.9 MALIGNANT NEOPLASM OF COLON, UNSPECIFIED: ICD-10-CM

## 2018-12-07 DIAGNOSIS — Z93.6 OTHER ARTIFICIAL OPENINGS OF URINARY TRACT STATUS: Chronic | ICD-10-CM

## 2018-12-07 DIAGNOSIS — Z93.3 COLOSTOMY STATUS: Chronic | ICD-10-CM

## 2018-12-07 DIAGNOSIS — Z98.891 HISTORY OF UTERINE SCAR FROM PREVIOUS SURGERY: Chronic | ICD-10-CM

## 2018-12-07 PROCEDURE — 96413 CHEMO IV INFUSION 1 HR: CPT

## 2018-12-07 PROCEDURE — 96375 TX/PRO/DX INJ NEW DRUG ADDON: CPT

## 2018-12-07 PROCEDURE — 96417 CHEMO IV INFUS EACH ADDL SEQ: CPT

## 2018-12-07 RX ORDER — DEXAMETHASONE 0.5 MG/5ML
20 ELIXIR ORAL ONCE
Qty: 0 | Refills: 0 | Status: COMPLETED | OUTPATIENT
Start: 2018-12-07 | End: 2018-12-07

## 2018-12-07 RX ORDER — ONDANSETRON 8 MG/1
16 TABLET, FILM COATED ORAL ONCE
Qty: 0 | Refills: 0 | Status: COMPLETED | OUTPATIENT
Start: 2018-12-07 | End: 2018-12-07

## 2018-12-07 RX ORDER — BEVACIZUMAB 400 MG/16ML
487 INJECTION, SOLUTION INTRAVENOUS ONCE
Qty: 0 | Refills: 0 | Status: COMPLETED | OUTPATIENT
Start: 2018-12-07 | End: 2018-12-07

## 2018-12-07 RX ORDER — IRINOTECAN HYDROCHLORIDE 100 MG/5ML
332 INJECTION, SOLUTION INTRAVENOUS ONCE
Qty: 0 | Refills: 0 | Status: COMPLETED | OUTPATIENT
Start: 2018-12-07 | End: 2018-12-07

## 2018-12-07 RX ADMIN — Medication 300 UNIT(S): at 12:30

## 2018-12-07 RX ADMIN — BEVACIZUMAB 238.96 MILLIGRAM(S): 400 INJECTION, SOLUTION INTRAVENOUS at 12:13

## 2018-12-07 RX ADMIN — Medication 220 MILLIGRAM(S): at 11:28

## 2018-12-07 RX ADMIN — IRINOTECAN HYDROCHLORIDE 177.73 MILLIGRAM(S): 100 INJECTION, SOLUTION INTRAVENOUS at 12:13

## 2018-12-07 RX ADMIN — ONDANSETRON 232 MILLIGRAM(S): 8 TABLET, FILM COATED ORAL at 11:28

## 2018-12-10 DIAGNOSIS — R11.2 NAUSEA WITH VOMITING, UNSPECIFIED: ICD-10-CM

## 2018-12-28 ENCOUNTER — APPOINTMENT (OUTPATIENT)
Dept: INFUSION THERAPY | Facility: HOSPITAL | Age: 58
End: 2018-12-28

## 2019-01-10 ENCOUNTER — APPOINTMENT (OUTPATIENT)
Dept: INFUSION THERAPY | Facility: HOSPITAL | Age: 59
End: 2019-01-10

## 2019-01-10 ENCOUNTER — OUTPATIENT (OUTPATIENT)
Dept: OUTPATIENT SERVICES | Facility: HOSPITAL | Age: 59
LOS: 1 days | End: 2019-01-10
Payer: COMMERCIAL

## 2019-01-10 VITALS
SYSTOLIC BLOOD PRESSURE: 120 MMHG | TEMPERATURE: 98 F | HEART RATE: 77 BPM | RESPIRATION RATE: 18 BRPM | DIASTOLIC BLOOD PRESSURE: 87 MMHG | OXYGEN SATURATION: 100 %

## 2019-01-10 DIAGNOSIS — Z93.6 OTHER ARTIFICIAL OPENINGS OF URINARY TRACT STATUS: Chronic | ICD-10-CM

## 2019-01-10 DIAGNOSIS — Z93.3 COLOSTOMY STATUS: Chronic | ICD-10-CM

## 2019-01-10 DIAGNOSIS — Z98.891 HISTORY OF UTERINE SCAR FROM PREVIOUS SURGERY: Chronic | ICD-10-CM

## 2019-01-10 DIAGNOSIS — C18.9 MALIGNANT NEOPLASM OF COLON, UNSPECIFIED: ICD-10-CM

## 2019-01-10 PROCEDURE — 96375 TX/PRO/DX INJ NEW DRUG ADDON: CPT

## 2019-01-10 PROCEDURE — 96417 CHEMO IV INFUS EACH ADDL SEQ: CPT

## 2019-01-10 PROCEDURE — 96413 CHEMO IV INFUSION 1 HR: CPT

## 2019-01-10 RX ORDER — BEVACIZUMAB 400 MG/16ML
518 INJECTION, SOLUTION INTRAVENOUS ONCE
Qty: 0 | Refills: 0 | Status: COMPLETED | OUTPATIENT
Start: 2019-01-10 | End: 2019-01-10

## 2019-01-10 RX ORDER — DEXAMETHASONE 0.5 MG/5ML
20 ELIXIR ORAL ONCE
Qty: 0 | Refills: 0 | Status: COMPLETED | OUTPATIENT
Start: 2019-01-10 | End: 2019-01-10

## 2019-01-10 RX ORDER — ATROPINE SULFATE 0.1 MG/ML
1 SYRINGE (ML) INJECTION ONCE
Qty: 0 | Refills: 0 | Status: COMPLETED | OUTPATIENT
Start: 2019-01-10 | End: 2019-01-10

## 2019-01-10 RX ORDER — ONDANSETRON 8 MG/1
16 TABLET, FILM COATED ORAL ONCE
Qty: 0 | Refills: 0 | Status: COMPLETED | OUTPATIENT
Start: 2019-01-10 | End: 2019-01-10

## 2019-01-10 RX ORDER — IRINOTECAN HYDROCHLORIDE 100 MG/5ML
344 INJECTION, SOLUTION INTRAVENOUS ONCE
Qty: 0 | Refills: 0 | Status: COMPLETED | OUTPATIENT
Start: 2019-01-10 | End: 2019-01-10

## 2019-01-10 RX ADMIN — IRINOTECAN HYDROCHLORIDE 178.13 MILLIGRAM(S): 100 INJECTION, SOLUTION INTRAVENOUS at 13:52

## 2019-01-10 RX ADMIN — Medication 1 MILLIGRAM(S): at 13:33

## 2019-01-10 RX ADMIN — ONDANSETRON 232 MILLIGRAM(S): 8 TABLET, FILM COATED ORAL at 13:33

## 2019-01-10 RX ADMIN — BEVACIZUMAB 241.44 MILLIGRAM(S): 400 INJECTION, SOLUTION INTRAVENOUS at 13:51

## 2019-01-10 RX ADMIN — Medication 220 MILLIGRAM(S): at 13:34

## 2019-01-11 DIAGNOSIS — R11.2 NAUSEA WITH VOMITING, UNSPECIFIED: ICD-10-CM

## 2019-01-31 ENCOUNTER — APPOINTMENT (OUTPATIENT)
Dept: INFUSION THERAPY | Facility: HOSPITAL | Age: 59
End: 2019-01-31

## 2019-01-31 ENCOUNTER — OUTPATIENT (OUTPATIENT)
Dept: OUTPATIENT SERVICES | Facility: HOSPITAL | Age: 59
LOS: 1 days | End: 2019-01-31
Payer: COMMERCIAL

## 2019-01-31 VITALS
HEART RATE: 70 BPM | TEMPERATURE: 98 F | SYSTOLIC BLOOD PRESSURE: 145 MMHG | OXYGEN SATURATION: 98 % | DIASTOLIC BLOOD PRESSURE: 84 MMHG | RESPIRATION RATE: 18 BRPM

## 2019-01-31 VITALS
OXYGEN SATURATION: 99 % | SYSTOLIC BLOOD PRESSURE: 152 MMHG | HEIGHT: 61 IN | DIASTOLIC BLOOD PRESSURE: 88 MMHG | TEMPERATURE: 98 F | WEIGHT: 154.1 LBS | RESPIRATION RATE: 18 BRPM

## 2019-01-31 DIAGNOSIS — Z93.6 OTHER ARTIFICIAL OPENINGS OF URINARY TRACT STATUS: Chronic | ICD-10-CM

## 2019-01-31 DIAGNOSIS — C18.8 MALIGNANT NEOPLASM OF OVERLAPPING SITES OF COLON: ICD-10-CM

## 2019-01-31 DIAGNOSIS — Z93.3 COLOSTOMY STATUS: Chronic | ICD-10-CM

## 2019-01-31 DIAGNOSIS — Z98.891 HISTORY OF UTERINE SCAR FROM PREVIOUS SURGERY: Chronic | ICD-10-CM

## 2019-01-31 PROCEDURE — 96375 TX/PRO/DX INJ NEW DRUG ADDON: CPT

## 2019-01-31 PROCEDURE — 96413 CHEMO IV INFUSION 1 HR: CPT

## 2019-01-31 PROCEDURE — 96417 CHEMO IV INFUS EACH ADDL SEQ: CPT

## 2019-01-31 RX ORDER — BEVACIZUMAB 400 MG/16ML
528 INJECTION, SOLUTION INTRAVENOUS ONCE
Qty: 0 | Refills: 0 | Status: COMPLETED | OUTPATIENT
Start: 2019-01-31 | End: 2019-01-31

## 2019-01-31 RX ORDER — IRINOTECAN HYDROCHLORIDE 100 MG/5ML
348 INJECTION, SOLUTION INTRAVENOUS ONCE
Qty: 0 | Refills: 0 | Status: COMPLETED | OUTPATIENT
Start: 2019-01-31 | End: 2019-01-31

## 2019-01-31 RX ORDER — ATROPINE SULFATE 0.1 MG/ML
1 SYRINGE (ML) INJECTION ONCE
Qty: 0 | Refills: 0 | Status: DISCONTINUED | OUTPATIENT
Start: 2019-01-31 | End: 2019-01-31

## 2019-01-31 RX ORDER — ONDANSETRON 8 MG/1
16 TABLET, FILM COATED ORAL ONCE
Qty: 0 | Refills: 0 | Status: COMPLETED | OUTPATIENT
Start: 2019-01-31 | End: 2019-01-31

## 2019-01-31 RX ORDER — DEXAMETHASONE 0.5 MG/5ML
20 ELIXIR ORAL ONCE
Qty: 0 | Refills: 0 | Status: COMPLETED | OUTPATIENT
Start: 2019-01-31 | End: 2019-01-31

## 2019-01-31 RX ADMIN — IRINOTECAN HYDROCHLORIDE 178.27 MILLIGRAM(S): 100 INJECTION, SOLUTION INTRAVENOUS at 11:22

## 2019-01-31 RX ADMIN — BEVACIZUMAB 200 MILLIGRAM(S): 400 INJECTION, SOLUTION INTRAVENOUS at 11:22

## 2019-01-31 RX ADMIN — Medication 220 MILLIGRAM(S): at 10:29

## 2019-01-31 RX ADMIN — ONDANSETRON 232 MILLIGRAM(S): 8 TABLET, FILM COATED ORAL at 10:29

## 2019-02-01 DIAGNOSIS — R11.2 NAUSEA WITH VOMITING, UNSPECIFIED: ICD-10-CM

## 2019-02-21 ENCOUNTER — APPOINTMENT (OUTPATIENT)
Dept: INFUSION THERAPY | Facility: HOSPITAL | Age: 59
End: 2019-02-21

## 2019-02-21 ENCOUNTER — OUTPATIENT (OUTPATIENT)
Dept: OUTPATIENT SERVICES | Facility: HOSPITAL | Age: 59
LOS: 1 days | End: 2019-02-21
Payer: COMMERCIAL

## 2019-02-21 VITALS
SYSTOLIC BLOOD PRESSURE: 147 MMHG | RESPIRATION RATE: 16 BRPM | OXYGEN SATURATION: 98 % | HEART RATE: 86 BPM | DIASTOLIC BLOOD PRESSURE: 86 MMHG | TEMPERATURE: 97 F

## 2019-02-21 VITALS
SYSTOLIC BLOOD PRESSURE: 147 MMHG | HEIGHT: 61 IN | WEIGHT: 156.97 LBS | RESPIRATION RATE: 16 BRPM | TEMPERATURE: 97 F | DIASTOLIC BLOOD PRESSURE: 87 MMHG | OXYGEN SATURATION: 98 % | HEART RATE: 83 BPM

## 2019-02-21 DIAGNOSIS — C18.9 MALIGNANT NEOPLASM OF COLON, UNSPECIFIED: ICD-10-CM

## 2019-02-21 DIAGNOSIS — Z93.3 COLOSTOMY STATUS: Chronic | ICD-10-CM

## 2019-02-21 DIAGNOSIS — Z93.6 OTHER ARTIFICIAL OPENINGS OF URINARY TRACT STATUS: Chronic | ICD-10-CM

## 2019-02-21 DIAGNOSIS — Z98.891 HISTORY OF UTERINE SCAR FROM PREVIOUS SURGERY: Chronic | ICD-10-CM

## 2019-02-21 PROCEDURE — 96375 TX/PRO/DX INJ NEW DRUG ADDON: CPT

## 2019-02-21 PROCEDURE — 96417 CHEMO IV INFUS EACH ADDL SEQ: CPT

## 2019-02-21 PROCEDURE — 96413 CHEMO IV INFUSION 1 HR: CPT

## 2019-02-21 RX ORDER — DEXAMETHASONE 0.5 MG/5ML
20 ELIXIR ORAL ONCE
Qty: 0 | Refills: 0 | Status: COMPLETED | OUTPATIENT
Start: 2019-02-21 | End: 2019-02-21

## 2019-02-21 RX ORDER — BEVACIZUMAB 400 MG/16ML
535 INJECTION, SOLUTION INTRAVENOUS ONCE
Qty: 0 | Refills: 0 | Status: COMPLETED | OUTPATIENT
Start: 2019-02-21 | End: 2019-02-21

## 2019-02-21 RX ORDER — IRINOTECAN HYDROCHLORIDE 100 MG/5ML
352 INJECTION, SOLUTION INTRAVENOUS ONCE
Qty: 0 | Refills: 0 | Status: COMPLETED | OUTPATIENT
Start: 2019-02-21 | End: 2019-02-21

## 2019-02-21 RX ORDER — ATROPINE SULFATE 0.1 MG/ML
1 SYRINGE (ML) INJECTION ONCE
Qty: 0 | Refills: 0 | Status: COMPLETED | OUTPATIENT
Start: 2019-02-21 | End: 2019-02-21

## 2019-02-21 RX ORDER — ONDANSETRON 8 MG/1
16 TABLET, FILM COATED ORAL ONCE
Qty: 0 | Refills: 0 | Status: COMPLETED | OUTPATIENT
Start: 2019-02-21 | End: 2019-02-21

## 2019-02-21 RX ADMIN — IRINOTECAN HYDROCHLORIDE 178.4 MILLIGRAM(S): 100 INJECTION, SOLUTION INTRAVENOUS at 10:08

## 2019-02-21 RX ADMIN — Medication 220 MILLIGRAM(S): at 09:33

## 2019-02-21 RX ADMIN — ONDANSETRON 232 MILLIGRAM(S): 8 TABLET, FILM COATED ORAL at 09:33

## 2019-02-21 RX ADMIN — BEVACIZUMAB 200 MILLIGRAM(S): 400 INJECTION, SOLUTION INTRAVENOUS at 10:10

## 2019-02-21 RX ADMIN — Medication 1 MILLIGRAM(S): at 09:57

## 2019-02-22 ENCOUNTER — OUTPATIENT (OUTPATIENT)
Dept: OUTPATIENT SERVICES | Facility: HOSPITAL | Age: 59
LOS: 1 days | End: 2019-02-22
Payer: COMMERCIAL

## 2019-02-22 DIAGNOSIS — Z93.6 OTHER ARTIFICIAL OPENINGS OF URINARY TRACT STATUS: Chronic | ICD-10-CM

## 2019-02-22 DIAGNOSIS — R11.2 NAUSEA WITH VOMITING, UNSPECIFIED: ICD-10-CM

## 2019-02-22 DIAGNOSIS — Z98.891 HISTORY OF UTERINE SCAR FROM PREVIOUS SURGERY: Chronic | ICD-10-CM

## 2019-02-22 DIAGNOSIS — Z93.3 COLOSTOMY STATUS: Chronic | ICD-10-CM

## 2019-02-22 LAB — GLUCOSE BLDC GLUCOMTR-MCNC: 94 MG/DL — SIGNIFICANT CHANGE UP (ref 70–99)

## 2019-02-22 PROCEDURE — 82962 GLUCOSE BLOOD TEST: CPT

## 2019-02-22 PROCEDURE — 78815 PET IMAGE W/CT SKULL-THIGH: CPT

## 2019-02-22 PROCEDURE — 78815 PET IMAGE W/CT SKULL-THIGH: CPT | Mod: 26

## 2019-02-22 PROCEDURE — A9552: CPT

## 2019-03-14 ENCOUNTER — APPOINTMENT (OUTPATIENT)
Dept: INFUSION THERAPY | Facility: HOSPITAL | Age: 59
End: 2019-03-14

## 2019-03-19 ENCOUNTER — APPOINTMENT (OUTPATIENT)
Dept: NEUROLOGY | Facility: CLINIC | Age: 59
End: 2019-03-19
Payer: COMMERCIAL

## 2019-03-19 VITALS
WEIGHT: 160 LBS | BODY MASS INDEX: 30.21 KG/M2 | DIASTOLIC BLOOD PRESSURE: 84 MMHG | HEIGHT: 61 IN | SYSTOLIC BLOOD PRESSURE: 142 MMHG | HEART RATE: 89 BPM

## 2019-03-19 DIAGNOSIS — Z82.49 FAMILY HISTORY OF ISCHEMIC HEART DISEASE AND OTHER DISEASES OF THE CIRCULATORY SYSTEM: ICD-10-CM

## 2019-03-19 DIAGNOSIS — R26.9 UNSPECIFIED ABNORMALITIES OF GAIT AND MOBILITY: ICD-10-CM

## 2019-03-19 DIAGNOSIS — I10 ESSENTIAL (PRIMARY) HYPERTENSION: ICD-10-CM

## 2019-03-19 DIAGNOSIS — E78.00 PURE HYPERCHOLESTEROLEMIA, UNSPECIFIED: ICD-10-CM

## 2019-03-19 DIAGNOSIS — Z78.9 OTHER SPECIFIED HEALTH STATUS: ICD-10-CM

## 2019-03-19 PROCEDURE — 99244 OFF/OP CNSLTJ NEW/EST MOD 40: CPT

## 2019-03-19 RX ORDER — AMLODIPINE BESYLATE 5 MG/1
5 TABLET ORAL
Refills: 0 | Status: ACTIVE | COMMUNITY

## 2019-03-19 RX ORDER — PREGABALIN 50 MG/1
50 CAPSULE ORAL
Qty: 90 | Refills: 2 | Status: ACTIVE | COMMUNITY
Start: 2019-03-19 | End: 1900-01-01

## 2019-03-19 RX ORDER — OMEPRAZOLE 40 MG/1
40 CAPSULE, DELAYED RELEASE ORAL
Refills: 0 | Status: ACTIVE | COMMUNITY

## 2019-03-19 NOTE — REVIEW OF SYSTEMS
[Shortness Of Breath] : shortness of breath [As Noted in HPI] : as noted in HPI [Joint Pain] : joint pain [Negative] : Genitourinary

## 2019-03-19 NOTE — HISTORY OF PRESENT ILLNESS
[FreeTextEntry1] : She says that in September 2017 she was diagnosed with stage 4 colon Cancer with mets to the lung, liver, pancreas and spine. She had a mass causing compression of her left kidney and needed a nephrostomy. Eventually she also needed an ostomy. She started chemo in October of 2017. \par She does not recall which chemo regimen it was. She had eight cycles. She was also started on Avastin for a short period of time. \par The chemo was working and tumors were shrinking. In July 2018 she had resection of the colon mass and revision of her ostomy. \par As her CEAs were increasing her chemo regimens were changed. \par Last week she spoke with Dr. Urias and the plan is to start a new chemo agent.\par \par She started to develop symptoms of neuropathy with her first chemo regimen but she thinks that it is getting progressively worse. \par She reports having tingling in her hands and tingling from her knees down to her toes.\par The sensation is uncomfortable but not necessarily painful.\par She feels as if there is something tight around her leg which is pulsating.\par She is starting to have some difficulty walking. She needs to stand up for a few seconds before taking a step.\par She thinks that she may have some weakness in her legs.\par She does have some low back pain. \par \par She did try gabapentin.She was taking 300 mg TID for about three months.

## 2019-03-19 NOTE — DISCUSSION/SUMMARY
[FreeTextEntry1] : Ms. Hebert is a 58 year old woman with stage 4 colon cancer.\par She has developed neuropathy, most likely from chemotherapy.\par She also a sensation of a tightness around her legs which concerns me for spinal cord compression, although there is no evidence of myelopathy on examination.\par I spoke to the radiologist who read her most recent PET scan. There is a sclerotic lesion at T8 but it is not clear if there is any spinal cord compression.\par I will order MRI cervical and thoracic spine to rule out any spinal cord compression. I am not sure if the presence of a spinal cord lesion would  significantly, but if a lesion is causing her gait impairment, she might be a candidate for localized radiation.\par Her last creatine was 1.3 so she should be able to have an MRI with contrast. \par I am prescribing Lyrica 50 mg TID (she can increase gradually) to help with symptoms of neuropathy. The dose can be increased as needed. If not effective we can also try Cymbalta.\par I will call her with results of the MRIs and follow-up with her in one month, sooner if needed.

## 2019-03-19 NOTE — DATA REVIEWED
[de-identified] : PET Scan 2/22/19:\par 1. Since 5/9/18, there has been an increase in the size and number of hypermetabolic lung nodules, consistent with the progression ofp ulmonary metastatic disease.\par 2. Development of osseous metastatic disease involving the left femoral neck.\par 3. Liver and pancreatic masses are hypermetabolic, consistent with worsening metastatic disease.\par 4. Hypermetabolic nodule anterior abdominal wall along inferior aspect of scar, suspicous for metastatic disease\par 5. Hypermetabolic mass left adnexa, suspicious for metastatic disease\par 6. Hypermetabolic retroperitoneal lymphadenopahy, suspicious for metastatic disease\par 7. Left percutaneous nephrostomy tube has been removed and there has been development of severe left hydroureteronephrosis, likely due to ureteral obstruction for metastatic disease. The left kidney is poorly functioning.

## 2019-03-19 NOTE — CONSULT LETTER
[Dear  ___] : Dear  [unfilled], [Consult Letter:] : I had the pleasure of evaluating your patient, [unfilled]. [Please see my note below.] : Please see my note below. [Consult Closing:] : Thank you very much for allowing me to participate in the care of this patient.  If you have any questions, please do not hesitate to contact me. [FreeTextEntry2] : Bj Urias [FreeTextEntry3] : Sincerely,\par \par \par Yelitza Mercado MD\par Diplomate, American Academy of Psychiatry and Neurology\par Board Certified in the Subspecialty of Clinical Neurophysiology\par Board Certified in the Subspecialty of Sleep Medicine\par Board Certified in the Subspecialty of Epilepsy\par  [DrSuresh  ___] : Dr. GALVEZ

## 2019-03-19 NOTE — PHYSICAL EXAM
[FreeTextEntry1] : Examination:\par Constitutional: normal, no apparent distress\par Eyes: normal conjunctiva b/l, no ptosis, visual fields full\par Respiratory: no respiratory distress, normal effort, normal auscultation\par Cardiovascular: normal rate, rhythm, no murmurs\par Neck: supple, no masses\par Vascular: carotids normal\par Skin: normal color, no rashes\par Psych: normal mood, affect\par \par Neurological:\par Memory: normal memory, oriented to person, place, time\par Language intact/no aphasia\par Cranial Nerves: II-XII intact, Pupils equally round and reactive to light, ocular muscles/movements intact, no ptosis, no facial weakness, tongue protrudes normally in the midline, \par Motor: normal tone, no pronator drift, full strength in all four extremities in the proximal and distal muscle groups\par Coordination: Fine motor movements intact, rapid alternating movements intact, finger to nose intact bilaterally\par Sensory: intact to light touch, joint position sense, absent vibration sense in feet and ankles bilaterally\par DTRs: 2/4 in right biceps, 0/4 in left biceps, 1/4 in b/l radialis, trace in b/l triceps, 1/4 in b/l patellars, absent ankle jerks bilaterally,  Babinskis negative bilaterally, negative Valdes's bilaterally\par Gait: wide based, antalgic

## 2019-04-04 ENCOUNTER — APPOINTMENT (OUTPATIENT)
Dept: INFUSION THERAPY | Facility: HOSPITAL | Age: 59
End: 2019-04-04

## 2019-04-11 ENCOUNTER — FORM ENCOUNTER (OUTPATIENT)
Age: 59
End: 2019-04-11

## 2019-04-12 ENCOUNTER — OUTPATIENT (OUTPATIENT)
Dept: OUTPATIENT SERVICES | Facility: HOSPITAL | Age: 59
LOS: 1 days | End: 2019-04-12
Payer: COMMERCIAL

## 2019-04-12 ENCOUNTER — APPOINTMENT (OUTPATIENT)
Dept: MRI IMAGING | Facility: CLINIC | Age: 59
End: 2019-04-12
Payer: COMMERCIAL

## 2019-04-12 DIAGNOSIS — Z93.3 COLOSTOMY STATUS: Chronic | ICD-10-CM

## 2019-04-12 DIAGNOSIS — Z93.6 OTHER ARTIFICIAL OPENINGS OF URINARY TRACT STATUS: Chronic | ICD-10-CM

## 2019-04-12 DIAGNOSIS — R26.9 UNSPECIFIED ABNORMALITIES OF GAIT AND MOBILITY: ICD-10-CM

## 2019-04-12 DIAGNOSIS — Z98.891 HISTORY OF UTERINE SCAR FROM PREVIOUS SURGERY: Chronic | ICD-10-CM

## 2019-04-12 PROCEDURE — 72157 MRI CHEST SPINE W/O & W/DYE: CPT

## 2019-04-12 PROCEDURE — A9585: CPT

## 2019-04-12 PROCEDURE — 72156 MRI NECK SPINE W/O & W/DYE: CPT | Mod: 26

## 2019-04-12 PROCEDURE — 72157 MRI CHEST SPINE W/O & W/DYE: CPT | Mod: 26

## 2019-04-12 PROCEDURE — 72156 MRI NECK SPINE W/O & W/DYE: CPT

## 2019-06-06 ENCOUNTER — OUTPATIENT (OUTPATIENT)
Dept: OUTPATIENT SERVICES | Facility: HOSPITAL | Age: 59
LOS: 1 days | End: 2019-06-06
Payer: COMMERCIAL

## 2019-06-06 ENCOUNTER — APPOINTMENT (OUTPATIENT)
Dept: CT IMAGING | Facility: HOSPITAL | Age: 59
End: 2019-06-06

## 2019-06-06 DIAGNOSIS — Z98.891 HISTORY OF UTERINE SCAR FROM PREVIOUS SURGERY: Chronic | ICD-10-CM

## 2019-06-06 DIAGNOSIS — Z93.3 COLOSTOMY STATUS: Chronic | ICD-10-CM

## 2019-06-06 DIAGNOSIS — Z93.6 OTHER ARTIFICIAL OPENINGS OF URINARY TRACT STATUS: Chronic | ICD-10-CM

## 2019-06-06 PROCEDURE — 74177 CT ABD & PELVIS W/CONTRAST: CPT

## 2019-06-06 PROCEDURE — 74177 CT ABD & PELVIS W/CONTRAST: CPT | Mod: 26

## 2019-06-06 PROCEDURE — 71260 CT THORAX DX C+: CPT | Mod: 26

## 2019-06-06 PROCEDURE — 71260 CT THORAX DX C+: CPT

## 2019-07-11 NOTE — PROGRESS NOTE ADULT - PROBLEM SELECTOR PLAN 6
Post-Care Instructions: I reviewed with the patient in detail post-care instructions. Patient is to keep the biopsy site dry overnight, and then apply bacitracin twice daily until healed. Patient may apply hydrogen peroxide soaks to remove any crusting. Destruction After The Procedure: No Biopsy Method: Dermablade Hemostasis: Drysol Billing Type: Third-Party Bill Curettage Text: The wound bed was treated with curettage after the biopsy was performed. Detail Level: Detailed Lab: 19 Notification Instructions: Patient will be notified of biopsy results. However, patient instructed to call the office if not contacted within 2 weeks. Wound Care: Petrolatum Electrodesiccation Text: The wound bed was treated with electrodesiccation after the biopsy was performed. Anesthesia Type: 1% lidocaine with epinephrine Additional Anesthesia Volume In Cc (Will Not Render If 0): 0 Cryotherapy Text: The wound bed was treated with cryotherapy after the biopsy was performed. Anesthesia Volume In Cc (Will Not Render If 0): 0.5 Type Of Destruction Used: Curettage Electrodesiccation And Curettage Text: The wound bed was treated with electrodesiccation and curettage after the biopsy was performed. Patient with initial CATRACHITA most likely 2/2 multifactorial due to obstruction with left hydronephrosis mixed with pre-renal causes.  -Cr 1.20 today improved from initial Cr. Will continue to monitor BMP  -Patient received Ketorolac this AM s/p surgery as per Surgery rec's; will attempt to avoid Kidney harming medications in future. Path Notes (To The Dermatopathologist): R/O bcc Depth Of Biopsy: dermis Consent: Written consent was obtained and risks were reviewed including but not limited to scarring, infection, bleeding, scabbing, incomplete removal, nerve damage and allergy to anesthesia. Biopsy Type: H and E Silver Nitrate Text: The wound bed was treated with silver nitrate after the biopsy was performed. Was A Bandage Applied: Yes Dressing: bandage Patient has been relatively normotensive during admission, well controlled on Norvasc.  -Will continue Norvasc 5mg Qd

## 2019-08-29 ENCOUNTER — RX RENEWAL (OUTPATIENT)
Age: 59
End: 2019-08-29

## 2019-08-29 RX ORDER — PREGABALIN 50 MG/1
50 CAPSULE ORAL
Qty: 90 | Refills: 2 | Status: ACTIVE | COMMUNITY
Start: 2019-08-29 | End: 1900-01-01

## 2019-09-05 ENCOUNTER — APPOINTMENT (OUTPATIENT)
Dept: NEUROLOGY | Facility: CLINIC | Age: 59
End: 2019-09-05
Payer: COMMERCIAL

## 2019-09-05 VITALS
SYSTOLIC BLOOD PRESSURE: 138 MMHG | HEART RATE: 102 BPM | BODY MASS INDEX: 25.86 KG/M2 | HEIGHT: 61 IN | DIASTOLIC BLOOD PRESSURE: 88 MMHG | WEIGHT: 137 LBS

## 2019-09-05 DIAGNOSIS — G62.9 POLYNEUROPATHY, UNSPECIFIED: ICD-10-CM

## 2019-09-05 DIAGNOSIS — M25.472 EFFUSION, LEFT ANKLE: ICD-10-CM

## 2019-09-05 PROCEDURE — 99213 OFFICE O/P EST LOW 20 MIN: CPT

## 2019-09-05 RX ORDER — IRBESARTAN 75 MG/1
75 TABLET ORAL
Refills: 0 | Status: ACTIVE | COMMUNITY

## 2019-09-05 RX ORDER — REGORAFENIB 40 MG/1
40 TABLET, FILM COATED ORAL
Refills: 0 | Status: ACTIVE | COMMUNITY

## 2019-09-05 RX ORDER — PREGABALIN 100 MG/1
100 CAPSULE ORAL 3 TIMES DAILY
Qty: 90 | Refills: 3 | Status: ACTIVE | COMMUNITY
Start: 2019-09-05 | End: 1900-01-01

## 2019-09-05 NOTE — DISCUSSION/SUMMARY
[FreeTextEntry1] : Ms. Hebert is a 58 year old woman with stage 4 colon cancer.\par She has developed neuropathy, most likely from chemotherapy.\par She has a history of a sclerotic lesion at T8.\par There was no evidence of spinal cord compression on MRI cervical and thoracic spine.\par \par -Peripheral Neuropathy - She continues to have severe pain from cancer treatment related peripheral neuropathy.\par Increase Lyrica to 100 mg TID. Potential side effects discussed.\par Agree with trial of Marinol for pain and appetite stimulation.\par \par -Swelling of left ankle - She has swelling for 1.5 weeks without any pitting edema.\par Will order ultrasound to r/o DVT and x-ray to r/o pathologic fracture.\par \par Since travel is difficult for her and she lives quite a distance from this location, I gave her the contact info of a neurologist in [place].\par \par She will follow up as needed.

## 2019-09-05 NOTE — DATA REVIEWED
[de-identified] : PET Scan 2/22/19:\par 1. Since 5/9/18, there has been an increase in the size and number of hypermetabolic lung nodules, consistent with the progression ofp ulmonary metastatic disease.\par 2. Development of osseous metastatic disease involving the left femoral neck.\par 3. Liver and pancreatic masses are hypermetabolic, consistent with worsening metastatic disease.\par 4. Hypermetabolic nodule anterior abdominal wall along inferior aspect of scar, suspicous for metastatic disease\par 5. Hypermetabolic mass left adnexa, suspicious for metastatic disease\par 6. Hypermetabolic retroperitoneal lymphadenopahy, suspicious for metastatic disease\par 7. Left percutaneous nephrostomy tube has been removed and there has been development of severe left hydroureteronephrosis, likely due to ureteral obstruction for metastatic disease. The left kidney is poorly functioning. \par \par MRI cervical and thoracic spine with and without contrast 4/12/19:\par 1. Sclerotic lesion involving the T8 vertebra as on prior PET/CT concerning for metastatic disease.\par 2. Mild multilevel cervical and thoracic spondylosis. Findings most significant at C4-C5 where osseous ridging contacts the ventral spinal cord.\par 3. Multiple pulmonary nodules in keeping with known lung metastatic disease.\par 4. Moderate to large left hydronephrosis as on the prior PET/CT.\par

## 2019-09-05 NOTE — CONSULT LETTER
[Dear  ___] : Dear  [unfilled], [Consult Letter:] : I had the pleasure of evaluating your patient, [unfilled]. [Please see my note below.] : Please see my note below. [Consult Closing:] : Thank you very much for allowing me to participate in the care of this patient.  If you have any questions, please do not hesitate to contact me. [FreeTextEntry2] : Bj Urias [FreeTextEntry3] : Sincerely,\par \par \par Yelitza Mercado MD\par Diplomate, American Academy of Psychiatry and Neurology\par Board Certified in the Subspecialty of Clinical Neurophysiology\par Board Certified in the Subspecialty of Sleep Medicine\par Board Certified in the Subspecialty of Epilepsy\par

## 2019-09-05 NOTE — HISTORY OF PRESENT ILLNESS
[FreeTextEntry1] : I last saw Ms. Hebert on 3/19/19.\par Since her last visit she has started on a new chemotherapy agent. \par Her regimen is three weeks on and 1 week off. It seems to be working well for her cancer, but she thinks that it is aggravating her neuropathy.\par In July she had an episode of increased pain. The pain was so severe in her hands and feet that she could not walk.\par Her oncologist had her hold off on the treatment to give her body a rest.\par Her pain is getting progressively worse.\par The pain is constant. She has a strong sensation of tingling.\par \par Dr. Urias prescribed oxycodone 5 mg four times per day. \par However, she finds that ibuprofen is more helpful than the oxycodone. However, she had to stop ibuprofen previously because she had coffee ground emesis.\par \par She has been given a prescription for Marinol. It was denied by insurance and is now in appeal.\par \par She has swelling of her left foot since last week. This started when she woke up with so much pain that she could not walk.

## 2019-09-05 NOTE — PHYSICAL EXAM
[FreeTextEntry1] : Examination:\par Constitutional: normal, no apparent distress\par Eyes: normal conjunctiva b/l, no ptosis, visual fields full\par Respiratory: no respiratory distress, normal effort, normal auscultation\par Cardiovascular: normal rate, rhythm, no murmurs\par Neck: supple, no masses\par Vascular: carotids normal\par Skin: normal color, no rashes\par Psych: normal mood, affect\par extremities: swelling of left ankle\par \par Neurological:\par Memory: normal memory, oriented to person, place, time\par Language intact/no aphasia\par Cranial Nerves: II-XII intact, Pupils equally round and reactive to light, ocular muscles/movements intact, no ptosis, no facial weakness, tongue protrudes normally in the midline, \par Motor: normal tone, no pronator drift, full strength in upper extremities, weakness in bilateral lower extremities, left worse than right\par Coordination: Fine motor movements intact, rapid alternating movements intact, finger to nose intact bilaterally\par Sensory: decreased sensation in glove and stocking distribution\par DTRs: diffusely hypoactive\par Gait: using walker, slow, antalgic\par

## 2019-09-09 ENCOUNTER — EMERGENCY (EMERGENCY)
Facility: HOSPITAL | Age: 59
LOS: 1 days | Discharge: HOME | End: 2019-09-09
Attending: EMERGENCY MEDICINE | Admitting: EMERGENCY MEDICINE
Payer: COMMERCIAL

## 2019-09-09 VITALS
HEART RATE: 100 BPM | RESPIRATION RATE: 18 BRPM | DIASTOLIC BLOOD PRESSURE: 84 MMHG | TEMPERATURE: 98 F | OXYGEN SATURATION: 100 % | SYSTOLIC BLOOD PRESSURE: 147 MMHG

## 2019-09-09 DIAGNOSIS — Z93.3 COLOSTOMY STATUS: Chronic | ICD-10-CM

## 2019-09-09 DIAGNOSIS — Z93.6 OTHER ARTIFICIAL OPENINGS OF URINARY TRACT STATUS: Chronic | ICD-10-CM

## 2019-09-09 DIAGNOSIS — Z98.891 HISTORY OF UTERINE SCAR FROM PREVIOUS SURGERY: Chronic | ICD-10-CM

## 2019-09-09 PROCEDURE — 99283 EMERGENCY DEPT VISIT LOW MDM: CPT

## 2019-09-09 NOTE — ED ADULT TRIAGE NOTE - CHIEF COMPLAINT QUOTE
PT had venous doppler of LLE today showing DVT in L femoral vein.  L>R.  denies CP/SOB.  Last oral chemo 2 weeks ago.  Colon cancer.  R chest wall port.  Pmhx. High cholesterol, HTN, Neuropathy.  EKG in progress.

## 2019-09-10 NOTE — ED PROVIDER NOTE - NSFOLLOWUPINSTRUCTIONS_ED_ALL_ED_FT
- Follow up with your primary doctor this week to discuss possibly starting anticoagulation    - Lab and imaging results, if performed, were discussed with you along with your discharge diagnosis    - Follow up with your doctor in 1 week - bring copies of your results if you were given. If you do not have a primary doctor, please call [phone] to find one convenient for you    - Return to the ED for any new, worsening, or concerning symptoms to you    - Continue all prescribed medications    - Take tylenol as directed as needed for pain    - Rest and keep yourself hydrated with fluids - Follow up with your primary doctor this week to discuss possibly starting anticoagulation    - Lab and imaging results, if performed, were discussed with you along with your discharge diagnosis    - Return to the ED for any new, worsening, or concerning symptoms to you ESPECIALLY chest pain, shortness of breath, or back pain as this may indicate development of a pulmonary embolism (blood clot in your lung), or develop worsening swelling/pain in your leg as this could be a new blood clot.    - Continue all prescribed medications    - Take tylenol as directed as needed for pain    - Rest and keep yourself hydrated with fluids

## 2019-09-10 NOTE — ED PROVIDER NOTE - ATTENDING CONTRIBUTION TO CARE
MD Ferrell:  I performed a face to face bedside interview with patient regarding history of present illness, review of symptoms and past medical history. I completed an independent physical exam(documented below).  I have discussed patient's plan of care with resident.   I agree with note as stated above, having amended the EMR as needed to reflect my findings. I have discussed the assessment and plan of care.  This includes during the time I functioned as the attending physician for this patient.  PE:  Gen: Alert, NAD  Head: NC, AT,  EOMI, normal lids/conjunctiva  ENT:  normal hearing, patent oropharynx without erythema/exudate  Neck: +supple, no tenderness/meningismus/JVD, +Trachea midline  Chest: no chest wall tenderness, equal chest rise  Pulm: Bilateral BS, normal resp effort, no wheeze/stridor/retractions  CV: RRR, no M/R/G, +dist pulses  Abd: +BS, soft, NT/ND  Rectal: deferred  Mskel: +edema L ankle down to foot compared w/ R foot  Skin: no rash  Neuro: AAOx3  MDM:  58yo F w/ pmh of htn, hcl, colon ca, currently undergoing chemotherapy (last infusion 2wks ago), LE neuropathy affecting pt's ability to ambulate (walks with a cane), send from out imaging center for +DVT study. Specifically, venous duplex of LLE positive for "chronic L femoral DVT". Denies cp, sob, palpitations, cough, f/c/n/v. Do NOT suspect PE. Discussed some of the risk/benefits of starting pt on anticoagulation (given that she is hypercoagulable) and via shared decision making, we decided that it would be best for pt to have this discussion with her oncologist and PMD on Wednesday (already has an appt scheduled). Provided return precautions/instructions.

## 2019-09-10 NOTE — ED PROVIDER NOTE - PROGRESS NOTE DETAILS
Marcio Kumar DO PGY2 - after discussion at length with family and patient, came to agreement that anticoagulation will not be started in the ED as we cannot follow closely with patient and given DVT is chronic in nature and not acute. Patient will follow up with her pmd this week where a decision will be made to whether anticoagulation will be started.

## 2019-09-10 NOTE — ED ADULT NURSE NOTE - OBJECTIVE STATEMENT
Patient presents today with complaints of left lower extremity dvt, accompanied by family member, confirmed with outpatient venous doppler, alert and oriented x 4, respirations are even and unlabored, S1, S2 regular, no IV access or blood drawn. Will follow up.

## 2019-09-10 NOTE — ED PROVIDER NOTE - CLINICAL SUMMARY MEDICAL DECISION MAKING FREE TEXT BOX
Hx cancer sent in by imaging center for chronic L DVT femoral. Sx consist of L ankle swelling and worsening of distal leg pain b/l. No signs or sx of PE. Appears well, exam benign w/ mild swelling of ankle. Will discuss that anticoagulation not immediately indicated in the ED and that f/u is recommended with PMD whether anticoagulation should be started or not.

## 2019-09-10 NOTE — ED PROVIDER NOTE - OBJECTIVE STATEMENT
58yo F hx colon CA last chemo 2 weeks ago, htn, neuropathy b/l legs sent in by imaging center for DVT found on US done for increasing L ankle swelling and "worsening of her neuropathic pain" in her calves, feet. Denies hx of prior blood clots. No cp, sob, abd pain, n/v/d, fever. 58yo F hx colon CA last chemo 2 weeks ago, htn, neuropathy b/l legs sent in by imaging center for chronic L femoral DVT found on US done for increasing L ankle swelling and "worsening of her neuropathic pain" in her calves, feet. Denies hx of prior blood clots. No cp, sob, abd pain, n/v/d, fever.

## 2019-09-10 NOTE — ED ADULT NURSE NOTE - NSIMPLEMENTINTERV_GEN_ALL_ED
Implemented All Fall with Harm Risk Interventions:  [place] to call system. Call bell, personal items and telephone within reach. Instruct patient to call for assistance. Room bathroom lighting operational. Non-slip footwear when patient is off stretcher. Physically safe environment: no spills, clutter or unnecessary equipment. Stretcher in lowest position, wheels locked, appropriate side rails in place. Provide visual cue, wrist band, yellow gown, etc. Monitor gait and stability. Monitor for mental status changes and reorient to person, place, and time. Review medications for side effects contributing to fall risk. Reinforce activity limits and safety measures with patient and family. Provide visual clues: red socks.

## 2019-09-10 NOTE — ED PROVIDER NOTE - PATIENT PORTAL LINK FT
You can access the FollowMyHealth Patient Portal offered by [facility] by registering at the following website: [email or web address]. By joining [facility]’s FollowMyHealth portal, you will also be able to view your health information using other applications (apps) compatible with our system.

## 2019-09-10 NOTE — ED PROVIDER NOTE - PHYSICAL EXAMINATION
Gen: Well appearing, NAD  Head: NCAT  HEENT: PERRL, MMM, normal conjunctiva, anicteric, neck supple  Lung: CTAB, no adventitious sounds  CV: RRR, no murmurs  Abd: soft, NTND, no rebound or guarding, no CVAT  MSK: 1+ edema on L, calf nonttp, thighs b/l nonttp, no visible deformities  Neuro: Moving all extremity grossly  Skin: Warm and dry, no evidence of rash  Psych: normal mood and affect

## 2021-06-02 NOTE — PROGRESS NOTE ADULT - SUBJECTIVE AND OBJECTIVE BOX
Initial Anesthesia Post-op Note    Patient: Livia Liu  Procedure(s) Performed: ROBOT-ASSISTED RECURRENT INCARCERATED INCISIONAL HERNIA REPAIR WITH MESH, BILATERAL COMPONENT SEPARATION, APPLICATION OF PRP AND TAP BLOCK USING DA JARRETT XI  Anesthesia type: General    Vitals Value Taken Time   Temp 36.0 06/02/21 1501   Pulse 77 06/02/21 1501   Resp 16 06/02/21 1501   SpO2 99 06/02/21 1501   /72 06/02/21 1501         Patient Location: PACU Phase 1  Post-op Vital Signs:stable  Level of Consciousness: participates in exam, answers questions appropriately, alert, awake, oriented, responds to stimulation, follows commands and lethargic  Respiratory Status: spontaneous ventilation, unassisted and face mask  Cardiovascular blood pressure returned to baseline  Hydration: euvolemic  Pain Management: well controlled  Handoff: Handoff to receiving nurse was performed and questions were answered  Vomiting: none  Nausea: None  Airway Patency:patent  Post-op Assessment: awake, alert, appropriately conversant, or baseline, no complications, patient tolerated procedure well with no complications and no evidence of recall      No complications documented.   57 year old F w/ PMH of recently diagnosed metastatic colon cancer, HTN, HLD, presenting to [facility] ED with abdominal discomfort and constipation x1 week.    O/N Events: Patient finished Golytely overnight.   Subjective/ROS: Patient has no complaints this morning. Is Anxious regarding her upcoming procedure for colonic stent. Denies HA, CP, SOB, n/v, changes in bowel/urinary habits.  12pt ROS otherwise negative.    VITALS  Vital Signs Last 24 Hrs  T(C): 36.9 (13 Sep 2017 05:30), Max: 37.1 (12 Sep 2017 17:38)  T(F): 98.4 (13 Sep 2017 05:30), Max: 98.8 (12 Sep 2017 20:44)  HR: 82 (13 Sep 2017 05:30) (82 - 89)  BP: 108/71 (13 Sep 2017 05:30) (108/71 - 146/81)  BP(mean): --  RR: 16 (13 Sep 2017 05:30) (16 - 20)  SpO2: 96% (13 Sep 2017 05:30) (96% - 98%)    PHYSICAL EXAM  General: A&Ox3; NAD  Head: NC/AT; MMM; PERRL; EOMI;  Neck: Supple; no JVD  Respiratory: CTA B/L; no wheezes/crackles   Cardiovascular: Regular rhythm/rate; S1/S2   Gastrointestinal: Soft; NDNT; normoactive BS  Back: Presence of Left Nephrostomy tube, dressing is dry. Draining clear fluid.  Extremities: WWP; no edema/cyanosis  Neurological:  CNII-XII grossly intact; 5/5 str all 4 ext, no obvious focal deficits  Psychological: Patient is slightly anxious     MEDICATIONS  (STANDING):  simvastatin 40 milliGRAM(s) Oral at bedtime  amLODIPine   Tablet 5 milliGRAM(s) Oral daily  sodium chloride 0.9%. 1000 milliLiter(s) (100 mL/Hr) IV Continuous <Continuous>  polyethylene glycol 3350 17 Gram(s) Oral two times a day  sodium ferric gluconate complex IVPB 125 milliGRAM(s) IV Intermittent daily    MEDICATIONS  (PRN):  acetaminophen   Tablet. 650 milliGRAM(s) Oral every 6 hours PRN Moderate Pain (4 - 6)      No Known Allergies      LABS (9/13 AM labs pending) 57 year old F w/ PMH of recently diagnosed metastatic colon cancer, HTN, HLD, presenting to [facility] ED with abdominal discomfort and constipation x1 week.    O/N Events: Patient finished Golytely overnight.   Subjective/ROS: Patient has no complaints this morning. Is Anxious regarding her upcoming procedure for colonic stent. Denies HA, CP, SOB, n/v, changes in bowel/urinary habits.  12pt ROS otherwise negative.    VITALS  Vital Signs Last 24 Hrs  T(C): 36.9 (13 Sep 2017 05:30), Max: 37.1 (12 Sep 2017 17:38)  T(F): 98.4 (13 Sep 2017 05:30), Max: 98.8 (12 Sep 2017 20:44)  HR: 82 (13 Sep 2017 05:30) (82 - 89)  BP: 108/71 (13 Sep 2017 05:30) (108/71 - 146/81)  BP(mean): --  RR: 16 (13 Sep 2017 05:30) (16 - 20)  SpO2: 96% (13 Sep 2017 05:30) (96% - 98%)    PHYSICAL EXAM  General: A&Ox3; NAD  Head: NC/AT; MMM; PERRL; EOMI;  Neck: Supple; no JVD  Respiratory: CTA B/L; no wheezes/crackles   Cardiovascular: Regular rhythm/rate; S1/S2   Gastrointestinal: Soft; NDNT; normoactive BS  Back: Presence of Left Nephrostomy tube, dressing is dry. Draining clear fluid.  Extremities: WWP; no edema/cyanosis  Neurological:  CNII-XII grossly intact; 5/5 str all 4 ext, no obvious focal deficits  Psychological: Patient is slightly anxious     MEDICATIONS  (STANDING):  simvastatin 40 milliGRAM(s) Oral at bedtime  amLODIPine   Tablet 5 milliGRAM(s) Oral daily  sodium chloride 0.9%. 1000 milliLiter(s) (100 mL/Hr) IV Continuous <Continuous>  polyethylene glycol 3350 17 Gram(s) Oral two times a day  sodium ferric gluconate complex IVPB 125 milliGRAM(s) IV Intermittent daily    MEDICATIONS  (PRN):  acetaminophen   Tablet. 650 milliGRAM(s) Oral every 6 hours PRN Moderate Pain (4 - 6)      No Known Allergies          LABS                        8.8    11.0  )-----------( 402      ( 13 Sep 2017 09:11 )             28.4     09-13    146<H>  |  105  |  3<L>  ----------------------------<  100<H>  3.7   |  24  |  1.00    Ca    9.2      13 Sep 2017 09:11  Mg     1.6     09-13 57 year old F w/ PMH of recently diagnosed metastatic colon cancer, HTN, HLD, presenting to [facility] ED with abdominal discomfort and constipation x1 week.    O/N Events: Patient finished Golytely overnight.   Subjective: Patient has no complaints this morning. Is Anxious regarding her upcoming procedure for colonic stent.       ROS  Denies HA, CP, SOB, n/v, changes in bowel/urinary habits.  12pt ROS otherwise negative.    VITALS  Vital Signs Last 24 Hrs  T(C): 36.9 (13 Sep 2017 05:30), Max: 37.1 (12 Sep 2017 17:38)  T(F): 98.4 (13 Sep 2017 05:30), Max: 98.8 (12 Sep 2017 20:44)  HR: 82 (13 Sep 2017 05:30) (82 - 89)  BP: 108/71 (13 Sep 2017 05:30) (108/71 - 146/81)  BP(mean): --  RR: 16 (13 Sep 2017 05:30) (16 - 20)  SpO2: 96% (13 Sep 2017 05:30) (96% - 98%)    PHYSICAL EXAM  General: A&Ox3; NAD  Head: NC/AT; MMM; PERRL; EOMI;  Neck: Supple; no JVD  Respiratory: CTA B/L; no wheezes/crackles   Cardiovascular: Regular rhythm/rate; S1/S2   Gastrointestinal: Soft; NDNT; normoactive BS  Back: Presence of Left Nephrostomy tube, dressing is dry. Draining clear fluid.  Extremities: WWP; no edema/cyanosis  Neurological:  CNII-XII grossly intact; 5/5 str all 4 ext, no obvious focal deficits  Psychological: Patient is slightly anxious     MEDICATIONS  (STANDING):  simvastatin 40 milliGRAM(s) Oral at bedtime  amLODIPine   Tablet 5 milliGRAM(s) Oral daily  sodium chloride 0.9%. 1000 milliLiter(s) (100 mL/Hr) IV Continuous <Continuous>  polyethylene glycol 3350 17 Gram(s) Oral two times a day  sodium ferric gluconate complex IVPB 125 milliGRAM(s) IV Intermittent daily    MEDICATIONS  (PRN):  acetaminophen   Tablet. 650 milliGRAM(s) Oral every 6 hours PRN Moderate Pain (4 - 6)      No Known Allergies          LABS                        8.8    11.0  )-----------( 402      ( 13 Sep 2017 09:11 )             28.4     09-13    146<H>  |  105  |  3<L>  ----------------------------<  100<H>  3.7   |  24  |  1.00    Ca    9.2      13 Sep 2017 09:11  Mg     1.6     09-13

## 2021-06-07 NOTE — PATIENT PROFILE ADULT. - PURPOSEFUL PROACTIVE ROUNDING
Patient Ketoconazole Pregnancy And Lactation Text: This medication is Pregnancy Category C and it isn't know if it is safe during pregnancy. It is also excreted in breast milk and breast feeding isn't recommended.

## 2021-10-01 NOTE — PATIENT PROFILE ADULT. - FUNCTIONAL SCREEN CURRENT LEVEL: EATING, MLM
REPORT CALLED TO DEIDRA AT [facility] IN [place]. AWAITING ARRIVAL OF SHABBIR NASH. (0) independent

## 2021-10-21 NOTE — ED PROVIDER NOTE - CROS ED GI ALL NEG
- - - Star Wedge Flap Text: The defect edges were debeveled with a #15 scalpel blade.  Given the location of the defect, shape of the defect and the proximity to free margins a star wedge flap was deemed most appropriate.  Using a sterile surgical marker, an appropriate rotation flap was drawn incorporating the defect and placing the expected incisions within the relaxed skin tension lines where possible. The area thus outlined was incised deep to adipose tissue with a #15 scalpel blade.  The skin margins were undermined to an appropriate distance in all directions utilizing iris scissors.

## 2022-03-21 NOTE — PHYSICAL THERAPY INITIAL EVALUATION ADULT - IMPAIRMENTS CONTRIBUTING TO GAIT DEVIATIONS, PT EVAL
Addended by: AVE SCHAEFER on: 3/21/2022 11:07 AM     Modules accepted: Orders    
steady gait throughout, however improved speed and quality of gait with RW vs hand-held assist , no LOBs noted, good safety awareness and obstacles negotiation./decreased strength/decreased flexibility

## 2022-07-11 NOTE — DISCHARGE NOTE ADULT - NS MD DC PLAN IMMU FLU PROVIDE INFO

## 2022-08-18 NOTE — DIETITIAN INITIAL EVALUATION ADULT. - PROBLEM SELECTOR PROBLEM 1
Abdominal pain Bed in lowest position, wheels locked, appropriate side rails in place/Call bell, personal items and telephone in reach/Instruct patient to call for assistance before getting out of bed or chair/Non-slip footwear when patient is out of bed/[place] to call system/Physically safe environment - no spills, clutter or unnecessary equipment/Purposeful Proactive Rounding/Room/bathroom lighting operational, light cord in reach

## 2023-05-30 NOTE — DISCHARGE NOTE ADULT - NS MD DC FALL RISK RISK
For information on Fall & Injury Prevention, visit [email or web address] Valtrex Pregnancy And Lactation Text: this medication is Pregnancy Category B and is considered safe during pregnancy. This medication is not directly found in breast milk but it's metabolite acyclovir is present.

## 2023-06-07 NOTE — DIETITIAN INITIAL EVALUATION ADULT. - PROBLEM/PLAN-2
for Itching.     • acetaminophen (TYLENOL) 325 MG tablet Take 650 mg by mouth every 4 hours as needed for Pain.     • hydroCORTisone (CORTIZONE) 1 % cream Apply topically 3 times daily. 15 g 2   • mupirocin (BACTROBAN) 2 % ointment Apply topically 3 times daily. 22 g 0   • amoxicillin (AMOXIL) 400 MG/5ML suspension Take 560 mg by mouth 2 times daily.     • cloNIDine (CATAPRES) 0.1 MG tablet Take 0.1 mg by mouth.     • cloNIDine 0.1 MG extended-release 12-hr tablet Take 1 tablet by mouth.         Immunizations  Up to date     Outpatient Pediatrician  Lex Conde MD      ROS  Review of Systems   Constitutional: Negative for activity change, appetite change, chills and diaphoresis.   HENT: Positive for tinnitus. Negative for congestion, dental problem, drooling, ear discharge, ear pain, facial swelling, hearing loss, postnasal drip, rhinorrhea, sinus pressure, sinus pain and sore throat.    Respiratory: Negative for apnea, choking and chest tightness.    Cardiovascular: Negative for chest pain and leg swelling.   Gastrointestinal: Positive for nausea. Negative for abdominal distention, abdominal pain and anal bleeding.   Genitourinary: Negative for difficulty urinating, dysuria, enuresis, flank pain and frequency.   Musculoskeletal: Negative for arthralgias, back pain, gait problem and myalgias.   Skin: Negative for color change, pallor and rash.   Neurological: Positive for syncope. Negative for light-headedness, numbness and headaches.           Physical Exam  Visit Vitals  /72   Pulse 74   Temp 98.3 °F (36.8 °C) (Oral)   Resp 18   Ht 5' 10.87\" (1.8 m)   Wt (!) 85 kg (187 lb 6.3 oz)   SpO2 98%   BMI 26.23 kg/m²          Intake/Output Summary (Last 24 hours) at 6/6/2023 1934  Last data filed at 6/6/2023 1800  Gross per 24 hour   Intake 0 ml   Output 600 ml   Net -600 ml         Physical Exam  Constitutional:       General: He is not in acute distress.     Appearance: Normal unremarkable   - Neurology on consult. Appreciative of recommendations   - EEG ordered     Resp:  - Stable on room air     CV:  - Monitor HR and BP   - On continuous cardiac monitor   - Orthostatics q shift     FEN/GI:  - general diet       VTE: 1, highest degree ambulation   Lines:  . Function, utilization, and necessity addressed/discussed on rounds  Dispo: inpatient, awaiting neurology clearance     Patient and plan discussed with family, nursing staff, and attending physician (Dr. Simon)    Richard Guerrero MD   6/6/2023    DISPLAY PLAN FREE TEXT

## 2023-09-02 NOTE — DIETITIAN INITIAL EVALUATION ADULT. - ENERGY NEEDS
Height: 5'1" Weight: 146lbs, IBW 105lbs+/-10%, %%, BMI 27.6  IBW used for calculations as pt >120% of IBW   Nutrient needs based on [facility] standards of care for maintenance in adults.   Needs adjusted 2/2 metastatic Ca undergoing chemo, suspected PCM, 23% unintentional wt loss <1 year No

## 2024-03-10 NOTE — PROGRESS NOTE ADULT - PROBLEM SELECTOR PLAN 4
Hide Additional Notes?: No Detail Level: Zone Detail Level: Detailed Attending Only Patient with Cr 1.4 on admission   -likely due to dehydration and poor PO intake as well as component of hydronephrosis although  -s/p 1L NS in ED, c/w NS @ 70 cc/hr Patient with Cr 1.4 on admission   -likely due to dehydration and poor PO intake as well as component of hydronephrosis    -s/p 1L NS in ED, c/w NS @ 70 cc/hr
